# Patient Record
Sex: MALE | Race: WHITE | NOT HISPANIC OR LATINO | Employment: OTHER | URBAN - METROPOLITAN AREA
[De-identification: names, ages, dates, MRNs, and addresses within clinical notes are randomized per-mention and may not be internally consistent; named-entity substitution may affect disease eponyms.]

---

## 2017-12-18 ENCOUNTER — TRANSFERRED RECORDS (OUTPATIENT)
Dept: HEALTH INFORMATION MANAGEMENT | Facility: CLINIC | Age: 67
End: 2017-12-18

## 2017-12-18 LAB
CREAT SERPL-MCNC: 0.97 MG/DL (ref 0.7–1.2)
GFR SERPL CREATININE-BSD FRML MDRD: >60 ML/MIN/1.73M2
GLUCOSE SERPL-MCNC: 158 MG/DL (ref 70–100)
HBA1C MFR BLD: 5.7 % (ref 4–6)
POTASSIUM SERPL-SCNC: 4.5 MEQ/L (ref 3.4–5.1)

## 2017-12-27 LAB
CREAT SERPL-MCNC: 0.93 MG/DL (ref 0.7–1.2)
GFR SERPL CREATININE-BSD FRML MDRD: >60 ML/MIN/1.73M2

## 2018-02-08 ENCOUNTER — TRANSFERRED RECORDS (OUTPATIENT)
Dept: HEALTH INFORMATION MANAGEMENT | Facility: CLINIC | Age: 68
End: 2018-02-08

## 2018-03-14 ENCOUNTER — TRANSFERRED RECORDS (OUTPATIENT)
Dept: HEALTH INFORMATION MANAGEMENT | Facility: CLINIC | Age: 68
End: 2018-03-14

## 2018-05-19 ENCOUNTER — TRANSFERRED RECORDS (OUTPATIENT)
Dept: HEALTH INFORMATION MANAGEMENT | Facility: CLINIC | Age: 68
End: 2018-05-19

## 2018-05-19 LAB
ALT SERPL-CCNC: 28 IU/L (ref 6–40)
AST SERPL-CCNC: 19 IU/L (ref 10–40)
CREAT SERPL-MCNC: 1.17 MG/DL (ref 0.7–1.2)
GFR SERPL CREATININE-BSD FRML MDRD: >60 ML/MIN/1.73M2
GLUCOSE SERPL-MCNC: 117 MG/DL (ref 70–100)
POTASSIUM SERPL-SCNC: 4.1 MEQ/L (ref 3.4–5.1)

## 2018-05-20 ENCOUNTER — TRANSFERRED RECORDS (OUTPATIENT)
Dept: HEALTH INFORMATION MANAGEMENT | Facility: CLINIC | Age: 68
End: 2018-05-20

## 2018-06-23 ENCOUNTER — TELEPHONE (OUTPATIENT)
Dept: UROLOGY | Facility: CLINIC | Age: 68
End: 2018-06-23

## 2018-06-23 NOTE — TELEPHONE ENCOUNTER
Health Call Center    Phone Message    May a detailed message be left on voicemail: yes    Reason for Call: Other: Patient's wife scheduled an appointment for patient in July. Patient has kidney stones and having complication and his wife is requesting a call. Patient's wife wants to discuss a couple things before patient's appointment. Please follow up with Cass     Action Taken: Message routed to:  [facility] ([facility] Urology

## 2018-06-25 ENCOUNTER — PRE VISIT (OUTPATIENT)
Dept: UROLOGY | Facility: CLINIC | Age: 68
End: 2018-06-25

## 2018-06-25 NOTE — TELEPHONE ENCOUNTER
MEDICAL RECORDS REQUEST   [facility]  Urology Clinic  [address] [place] [address]  PHONE: [phone]  Fax: [phone]        FUTURE VISIT INFORMATION                                                   Nash De Leon, : 1950 scheduled for future visit at [facility]    APPOINTMENT INFORMATION:    Date: 2018    Provider:  JI TORRES    Reason for Visit/Diagnosis: KIDNEY STONES    REFERRAL INFORMATION:    Referring provider:  SELF    Specialty: SELF    Referring providers clinic:  SELF    Clinic contact number:  SELF    RECORDS REQUESTED FOR VISIT                                                     NOTES  STATUS/DETAILS   OFFICE NOTE from referring provider  yes   OFFICE NOTE from other specialist  no   DISCHARGE SUMMARY from hospital  no   DISCHARGE REPORT from the ER  no   OPERATIVE REPORT  no   MEDICATION LIST  yes   KIDNEY STONE     CT STONE  yes   IMAGING (IMAGES & REPORT)  yes   KUB  yes       PRE-VISIT CHECKLIST      Record collection complete yes   Appointment appropriately scheduled           (right time/right provider) Yes   Joint diagnostic appointment coordinated correctly          (ensure right order & amount of time) No   MyChart activation If no, please explain IN PROCESS   Questionnaire complete If no, please explain IN PROCESS     Completed by: Soco Abrams

## 2018-06-25 NOTE — TELEPHONE ENCOUNTER
[facility] Call [facility]    Phone Message    May a detailed message be left on voicemail: no    Reason for Call: Other: Patient wife returning missed call from Wendy. Please follow-up.      Action Taken: Message routed to:  [facility] Uro

## 2018-06-26 ENCOUNTER — TELEPHONE (OUTPATIENT)
Dept: UROLOGY | Facility: CLINIC | Age: 68
End: 2018-06-26

## 2018-06-26 ENCOUNTER — PRE VISIT (OUTPATIENT)
Dept: UROLOGY | Facility: CLINIC | Age: 68
End: 2018-06-26

## 2018-06-26 NOTE — TELEPHONE ENCOUNTER
Called patient and left message that we could see him today at 1pm I need confirmation that he is coming and records must come too and imaging. Wendy Ferrera LPN Staff Nurse

## 2018-07-24 ENCOUNTER — TRANSFERRED RECORDS (OUTPATIENT)
Dept: HEALTH INFORMATION MANAGEMENT | Facility: CLINIC | Age: 68
End: 2018-07-24

## 2018-07-24 ENCOUNTER — CARE COORDINATION (OUTPATIENT)
Dept: UROLOGY | Facility: CLINIC | Age: 68
End: 2018-07-24

## 2018-07-24 ENCOUNTER — OFFICE VISIT (OUTPATIENT)
Dept: UROLOGY | Facility: CLINIC | Age: 68
End: 2018-07-24
Payer: COMMERCIAL

## 2018-07-24 ENCOUNTER — APPOINTMENT (OUTPATIENT)
Dept: UROLOGY | Facility: CLINIC | Age: 68
End: 2018-07-24
Payer: COMMERCIAL

## 2018-07-24 VITALS
HEART RATE: 63 BPM | WEIGHT: 159.4 LBS | HEIGHT: 64 IN | DIASTOLIC BLOOD PRESSURE: 76 MMHG | BODY MASS INDEX: 27.21 KG/M2 | SYSTOLIC BLOOD PRESSURE: 129 MMHG

## 2018-07-24 DIAGNOSIS — Z01.89 ENCOUNTER FOR URINE TEST: Primary | ICD-10-CM

## 2018-07-24 DIAGNOSIS — N20.1 CALCULUS OF URETER: Primary | ICD-10-CM

## 2018-07-24 DIAGNOSIS — Z01.89 ENCOUNTER FOR URINE TEST: ICD-10-CM

## 2018-07-24 RX ORDER — NITROGLYCERIN 0.4 MG/1
0.4 TABLET SUBLINGUAL EVERY 5 MIN PRN
COMMUNITY
Start: 2017-12-18

## 2018-07-24 RX ORDER — CLOPIDOGREL BISULFATE 75 MG/1
TABLET ORAL
COMMUNITY
Start: 2018-04-23 | End: 2018-09-11

## 2018-07-24 RX ORDER — ATORVASTATIN CALCIUM 80 MG/1
80 TABLET, FILM COATED ORAL DAILY
COMMUNITY
Start: 2018-01-26 | End: 2023-01-01

## 2018-07-24 RX ORDER — ATORVASTATIN CALCIUM 80 MG/1
TABLET, FILM COATED ORAL
COMMUNITY
Start: 2018-03-31 | End: 2018-09-11

## 2018-07-24 RX ORDER — METOPROLOL SUCCINATE 25 MG/1
TABLET, EXTENDED RELEASE ORAL
COMMUNITY
Start: 2018-06-25 | End: 2023-01-01

## 2018-07-24 RX ORDER — POLYETHYLENE GLYCOL 3350 17 G/17G
POWDER, FOR SOLUTION ORAL
COMMUNITY
Start: 2018-02-08 | End: 2018-09-11

## 2018-07-24 RX ORDER — CLOPIDOGREL BISULFATE 75 MG/1
75 TABLET ORAL DAILY
COMMUNITY
Start: 2018-01-26 | End: 2023-01-01

## 2018-07-24 RX ORDER — HYDROCODONE BITARTRATE AND ACETAMINOPHEN 5; 325 MG/1; MG/1
TABLET ORAL
COMMUNITY
Start: 2018-05-20 | End: 2018-07-24

## 2018-07-24 ASSESSMENT — ENCOUNTER SYMPTOMS
WEAKNESS: 0
DISTURBANCES IN COORDINATION: 0
HEARTBURN: 0
HEMATURIA: 1
ABDOMINAL PAIN: 0
NAIL CHANGES: 0
ALTERED TEMPERATURE REGULATION: 0
FATIGUE: 1
TINGLING: 1
PANIC: 0
LOSS OF CONSCIOUSNESS: 0
SINUS PAIN: 0
FLANK PAIN: 1
SEIZURES: 0
DECREASED APPETITE: 0
DYSPNEA ON EXERTION: 1
BLOOD IN STOOL: 0
MUSCLE CRAMPS: 1
SPEECH CHANGE: 0
DYSURIA: 0
NUMBNESS: 1
HALLUCINATIONS: 0
HEADACHES: 0
COUGH: 1
TREMORS: 0
MYALGIAS: 1
NAUSEA: 1
DIZZINESS: 1
COUGH DISTURBING SLEEP: 0
MEMORY LOSS: 0
TASTE DISTURBANCE: 0
POSTURAL DYSPNEA: 0
BRUISES/BLEEDS EASILY: 1
DEPRESSION: 1
VOMITING: 1
POLYPHAGIA: 0
SORE THROAT: 0
DIARRHEA: 0
INSOMNIA: 0
SKIN CHANGES: 0
WEIGHT LOSS: 0
FEVER: 0
BLOATING: 0
WHEEZING: 1
INCREASED ENERGY: 1
DECREASED CONCENTRATION: 0
TROUBLE SWALLOWING: 0
POLYDIPSIA: 1
STIFFNESS: 1
NECK PAIN: 1
HOARSE VOICE: 0
SWOLLEN GLANDS: 0
BACK PAIN: 1
MUSCLE WEAKNESS: 1
SINUS CONGESTION: 1
DIFFICULTY URINATING: 1
SPUTUM PRODUCTION: 1
WEIGHT GAIN: 0
NERVOUS/ANXIOUS: 0
SNORES LOUDLY: 1
RECTAL PAIN: 0
PARALYSIS: 0
NECK MASS: 0
JOINT SWELLING: 1
CHILLS: 0
POOR WOUND HEALING: 1
JAUNDICE: 0
BOWEL INCONTINENCE: 0
NIGHT SWEATS: 0
CONSTIPATION: 0
SHORTNESS OF BREATH: 0
SMELL DISTURBANCE: 0
HEMOPTYSIS: 0
ARTHRALGIAS: 1

## 2018-07-24 ASSESSMENT — PAIN SCALES - GENERAL: PAINLEVEL: MILD PAIN (2)

## 2018-07-24 NOTE — PATIENT INSTRUCTIONS
Jessa will discuss surgery with you.     It was a pleasure meeting with you today.  Thank you for allowing me and my team the privilege of caring for you today.  YOU are the reason we are here, and I truly hope we provided you with the excellent service you deserve.  Please let us know if there is anything else we can do for you so that we can be sure you are leaving completely satisfied with your care experience.      Vadim Banks LPN

## 2018-07-24 NOTE — MR AVS SNAPSHOT
After Visit Summary   7/24/2018    Nash De Leon    MRN: [id number]           Patient Information     Date Of Birth          1950        Visit Information        Provider Department      7/24/2018 2:20 PM Terrell Forman MD [facility]        Today's Diagnoses     Calculus of ureter    -  1      Care Instructions    Jessa will discuss surgery with you.     It was a pleasure meeting with you today.  Thank you for allowing me and my team the privilege of caring for you today.  YOU are the reason we are here, and I truly hope we provided you with the excellent service you deserve.  Please let us know if there is anything else we can do for you so that we can be sure you are leaving completely satisfied with your care experience.      Vadim Banks LPN          Follow-ups after your visit        Your next 10 appointments already scheduled     Aug 23, 2018   Procedure with Terrell Forman MD   [facility], [place], Same Day Surgery (--)    [address] [address] [phone]              Who to contact     Please call your clinic at [phone] to:    Ask questions about your health    Make or cancel appointments    Discuss your medicines    Learn about your test results    Speak to your doctor            Additional Information About Your Visit        [employer] Information     [employer] gives you secure access to your electronic health record. If you see a primary care provider, you can also send messages to your care team and make appointments. If you have questions, please call your primary care clinic.  If you do not have a primary care provider, please call [phone] and they will assist you.      [employer] is an electronic gateway that provides easy, online access to your medical records. With [employer], you can request a clinic appointment, read your test results, renew a prescription or communicate with your care team.     To  "access your existing account, please contact your [facility] or call [phone] for assistance.        Care EveryWhere ID     This is your Care EveryWhere ID. This could be used by other organizations to access your [facility] medical records  [id number]        Your Vitals Were     Pulse Height BMI (Body Mass Index)             63 1.626 m (5' 4\") 27.36 kg/m2          Blood Pressure from Last 3 Encounters:   07/24/18 129/76    Weight from Last 3 Encounters:   07/24/18 72.3 kg (159 lb 6.4 oz)              We Performed the Following     [facility]le-Operative Worksheet (Urology)        Primary Care Provider    None Specified       No primary provider on file.        Equal Access to Services     RON BROWN : Efrain Alvarez, diandra acevedo, marixa beyer, colleen menard . So [facility] [phone].    ATENCIÓN: Si habla español, tiene a lemus disposición servicios gratuitos de asistencia lingüística. Llame al [phone].    We comply with applicable federal civil rights laws and [place] laws. We do not discriminate on the basis of race, color, national origin, age, disability, sex, sexual orientation, or gender identity.            Thank you!     Thank you for choosing [facility] UROLOGY AND [facility] FOR PR[facility]ATE [facility] UROLOGIC [facility]CERS  for your care. Our goal is always to provide you with excellent care. Hearing back from our patients is one way we can continue to improve our services. Please take a few minutes to complete the written survey that you may receive in the mail after your visit with us. Thank you!             Your Updated Medication List - Protect others around you: Learn how to safely use, store and throw away your medicines at www.disposemymeds.org.          This list is accurate as of 7/24/18 11:59 PM.  Always use your most recent med list.                   Brand Name Dispense Instructions for use Diagnosis    aspirin 81 MG tablet      " Take 81 mg by mouth        * atorvastatin 80 MG tablet    LIPITOR     Take 80 mg by mouth        * atorvastatin 80 MG tablet    LIPITOR          * clopidogrel 75 MG tablet    PLAVIX     Take 75 mg by mouth        * clopidogrel 75 MG tablet    PLAVIX          metoprolol succinate 25 MG 24 hr tablet    TOPROL-XL     TAKE 1/2 TABLET ONCE DAILY - DO NOT CRUSH OR CHEW        nitroGLYcerin 0.4 MG sublingual tablet    NITROSTAT     Place 0.4 mg under the tongue        polyethylene glycol powder    MIRALAX/GLYCOLAX          * Notice:  This list has 4 medication(s) that are the same as other medications prescribed for you. Read the directions carefully, and ask your doctor or other care provider to review them with you.

## 2018-07-24 NOTE — PROGRESS NOTES
Chief Complaint:   Left ureteral stone         History of Present Illness:    Nash De Leon is a very pleasant 67 year old male who presents with a history of CAD, HLD, and Meniere's disease. Patient presented to an outside ER with sudden onset left flank pain associated with hematuria and dysuria. A 2.1 cm stone was seen in the left proximal ureter on CT and a smaller one more distal, causing hydronephrosis. This was stented leading to a cessation in pain. However, the patient continues to have hematuria with excessive activity, but the pain has resolved since stent placement. Patient had two stents placed in his heart in December 2017. He is on Plavix and a baby aspirin currently at the recommendation of his cardiologist.     First stone: 05/10/18  Number of stone surgeries: stent placement done in May, stent still present  Number of stones passed spontaneously: 0  History of UTI: no  Prior Stone Analysis: no  Family History Nephrolithasis: sister has had one stone  Stone Risk Factors: obesity  Prior Metabolic Workup: no         Past Medical History:   CAD, HLD, Meniere's disease         Past Surgical History:   TURP         Medications     Current Outpatient Prescriptions   Medication     aspirin 81 MG tablet     atorvastatin (LIPITOR) 80 MG tablet     atorvastatin (LIPITOR) 80 MG tablet     clopidogrel (PLAVIX) 75 MG tablet     clopidogrel (PLAVIX) 75 MG tablet     metoprolol succinate (TOPROL-XL) 25 MG 24 hr tablet     nitroGLYcerin (NITROSTAT) 0.4 MG sublingual tablet     polyethylene glycol (MIRALAX/GLYCOLAX) powder     No current facility-administered medications for this visit.             Family History:   Sister with stone's, otherwise noncontributory         Social History:     Social History     Social History     Marital status: Single     Spouse name: N/A     Number of children: N/A     Years of education: N/A     Occupational History     Not on file.     Social History Main Topics      "Smoking status: Former Smoker     Quit date: 7/24/1998     Smokeless tobacco: Never Used     Alcohol use Not on file     Drug use: Not on file     Sexual activity: Not on file     Other Topics Concern     Not on file     Social History Narrative     No narrative on file            Allergies:   Review of patient's allergies indicates not on file.         Review of Systems:  From intake questionnaire   Negative 14 system review except as noted on HPI, nurse's note.         Physical Exam:   Patient is a 67 year old  male   Vitals: Blood pressure 129/76, pulse 63, height 1.626 m (5' 4\"), weight 72.3 kg (159 lb 6.4 oz).  General Appearance Adult: Alert, no acute distress, oriented  HENT: throat/mouth:normal, good dentition  Lungs: no respiratory distress, or pursed lip breathing  Heart: No obvious jugular venous distension present  Abdomen: soft, nontender, no organomegaly or masses, Body mass index is 27.36 kg/(m^2).  Musculoskeltal: extremities normal, no peripheral edema  Skin: no suspicious lesions or rashes  Neuro: Alert, oriented, speech and mentation normal  Psych: affect and mood normal  Gait: Normal  : deferred        Labs and Pathology:    I personally reviewed all applicable laboratory data and went over findings with patient  Significant for:    CBC RESULTS:  Getting blood work today     BMP RESULTS:  Recent Labs   Lab Test 05/19/18 12/27/17 12/18/17   POTASSIUM  4.1   --   4.5   GLC  117*   --   158*   CR  1.17  0.93  0.97   GFRESTIMATED  >60  >60  >60         Imaging:    I personally reviewed all applicable imaging and went over the below findings with patient.    CT (OS 5/19.18)    TECHNIQUE: Volumetric multidetector CT images of the abdomen and pelvis from the lung bases to the pubic symphysis were obtained following the administration of 100 mL of Omnipaque 350 IV contrast.  Multiple planes were reconstructed for examination.      FINDINGS:   The cardiac chambers and pericardium are grossly normal. " The visualized lung bases are clear without focal pulmonary infiltrate, pleural effusion, or pneumothorax.     There is a large stone within the proximal left ureter measuring approximately 1.6 x 0.9 x 2.0 cm (AP/Trans/CC). There is moderate left-sided hydronephrosis and hydroureter. There is a smaller stone within the mid aspect of left ureter measuring approximately 4 5-mm best visualized on series 2 image 62/112. The urinary bladder is grossly unremarkable. There is a smaller 2 mm nonobstructing stone within the inferior aspect the left kidney. There is soft tissue stranding adjacent to the left kidney and left ureter.    The liver is normal in size without intrahepatic ductal dilatation or focal lesion. Small stone within the fundal portion of the gallbladder (series 2 image 42/112) without evidence of pericholecystic inflammatory changes. The common bile duct, spleen, pancreas,  and bilateral adrenal glands are unremarkable. The non opacified small bowel is unremarkable. There is scattered colonic diverticulosis without evidence diverticulitis. There is no pathologic mesenteric or abdominal lymphadenopathy. There is no free air or free fluid within the peritoneal cavity. The nonaneurysmal aorta and its branches are unremarkable.     There are no acute osseous abnormalities.     IMPRESSION:   1. There is a large stone within the proximal left ureter measuring approximately 1.6 x 0.9 x 2.0 cm (AP/Trans/CC) with moderate left-sided hydronephrosis and hydroureter. There is a smaller stone within the mid aspect of the left ureter measuring approximately 4 5-mm. Nonobstructing stone within the inferior aspect of the left kidney measuring approximately 2 mm. Would recommend consultation with urology services for further evaluation with possible intervention.             Assessment and Plan:     Assessment:67 year old male with large left ureteral stones, stent in place currently. On both Plavix and a baby aspirin  currently following cardiac stent placement in December 2017    Plan:  We discussed the surgical treatment options for renal stones including shock wave lithotripsy, ureteroscopy, and percutaneous nephrolithotomy.  We discussed the risks and benefits of each approach in the context of the patient's current stone burden.  After consideration of each Mr. De Leon expressed preference for PCNL given the higher likelihood of complete stone clearance.  He is aware of the potential for increased risk of complication, particualrly bleeding if on aspirin.  We alse discussed the risks of infection, incomplete stone removal, damage to adjacent organs, and need for staged procedures.  Discussed that PCNL not feasible if has to stay on plavix but that URS could be attempted though would likely be longer procedure with higher loielihood of residual stones and need for second stage.      We will contact the patient's cardiologist and see whether or not he is able to hold his Plavix for a week prior to a procedure. If the Plavix can be held we will plan on percutaneous intervention and if it can not be held we will plan on a a two stage ureteroscopic procedure. The patient understands this, will tentativley plan for URS possible PCNL pending anticoagulation recommendations from his cardiologist.    Cam Romano, MS4    Scribe Disclosure:   IMars, am serving as a scribe; to document services personally performed by Dr. Forman- -based on data collection and the provider's statements to me.     Terrell BLUE saw and evaluated this patient and agree with the plan as stated above.  I personally performed all listed procedures.

## 2018-07-24 NOTE — NURSING NOTE
Chief Complaint   Patient presents with     Consult     kidney stone management    Vadim Banks LPN

## 2018-07-24 NOTE — LETTER
7/24/2018       RE: Nash De Leon  [address]  [facility] [address]     Dear Colleague,    Thank you for referring your patient, Nash De Leon, to the [facility] UROLOGY AND INST FOR PROSTATE AND UROLOGIC CANCERS at [facility]. Please see a copy of my visit note below.          Chief Complaint:   Left ureteral stone         History of Present Illness:    Nash De Leon is a very pleasant 67 year old male who presents with a history of CAD, HLD, and Meniere's disease. Patient presented to an outside ER with sudden onset left flank pain associated with hematuria and dysuria. A 2.1 cm stone was seen in the left proximal ureter on CT and a smaller one more distal, causing hydronephrosis. This was stented leading to a cessation in pain. However, the patient continues to have hematuria with excessive activity, but the pain has resolved since stent placement. Patient had two stents placed in his heart in December 2017. He is on Plavix and a baby aspirin currently at the recommendation of his cardiologist.     First stone: 05/10/18  Number of stone surgeries: stent placement done in May, stent still present  Number of stones passed spontaneously: 0  History of UTI: no  Prior Stone Analysis: no  Family History Nephrolithasis: sister has had one stone  Stone Risk Factors: obesity  Prior Metabolic Workup: no         Past Medical History:   CAD, HLD, Meniere's disease         Past Surgical History:   TURP         Medications     Current Outpatient Prescriptions   Medication     aspirin 81 MG tablet     atorvastatin (LIPITOR) 80 MG tablet     atorvastatin (LIPITOR) 80 MG tablet     clopidogrel (PLAVIX) 75 MG tablet     clopidogrel (PLAVIX) 75 MG tablet     metoprolol succinate (TOPROL-XL) 25 MG 24 hr tablet     nitroGLYcerin (NITROSTAT) 0.4 MG sublingual tablet     polyethylene glycol (MIRALAX/GLYCOLAX) powder     No current facility-administered medications for this visit.  "            Family History:   Sister with stone's, otherwise noncontributory         Social History:     Social History     Social History     Marital status: Single     Spouse name: N/A     Number of children: N/A     Years of education: N/A     Occupational History     Not on file.     Social History Main Topics     Smoking status: Former Smoker     Quit date: 7/24/1998     Smokeless tobacco: Never Used     Alcohol use Not on file     Drug use: Not on file     Sexual activity: Not on file     Other Topics Concern     Not on file     Social History Narrative     No narrative on file            Allergies:   Review of patient's allergies indicates not on file.         Review of Systems:  From intake questionnaire   Negative 14 system review except as noted on HPI, nurse's note.         Physical Exam:   Patient is a 67 year old  male   Vitals: Blood pressure 129/76, pulse 63, height 1.626 m (5' 4\"), weight 72.3 kg (159 lb 6.4 oz).  General Appearance Adult: Alert, no acute distress, oriented  HENT: throat/mouth:normal, good dentition  Lungs: no respiratory distress, or pursed lip breathing  Heart: No obvious jugular venous distension present  Abdomen: soft, nontender, no organomegaly or masses, Body mass index is 27.36 kg/(m^2).  Musculoskeltal: extremities normal, no peripheral edema  Skin: no suspicious lesions or rashes  Neuro: Alert, oriented, speech and mentation normal  Psych: affect and mood normal  Gait: Normal  : deferred        Labs and Pathology:    I personally reviewed all applicable laboratory data and went over findings with patient  Significant for:    CBC RESULTS:  Getting blood work today     BMP RESULTS:  Recent Labs   Lab Test 05/19/18 12/27/17 12/18/17   POTASSIUM  4.1   --   4.5   GLC  117*   --   158*   CR  1.17  0.93  0.97   GFRESTIMATED  >60  >60  >60         Imaging:    I personally reviewed all applicable imaging and went over the below findings with patient.    CT ([facility] " 5/19.18)    TECHNIQUE: Volumetric multidetector CT images of the abdomen and pelvis from the lung bases to the pubic symphysis were obtained following the administration of 100 mL of Omnipaque 350 IV contrast.  Multiple planes were reconstructed for examination.      FINDINGS:   The cardiac chambers and pericardium are grossly normal. The visualized lung bases are clear without focal pulmonary infiltrate, pleural effusion, or pneumothorax.     There is a large stone within the proximal left ureter measuring approximately 1.6 x 0.9 x 2.0 cm (AP/Trans/CC). There is moderate left-sided hydronephrosis and hydroureter. There is a smaller stone within the mid aspect of left ureter measuring approximately 4 5-mm best visualized on series 2 image 62/112. The urinary bladder is grossly unremarkable. There is a smaller 2 mm nonobstructing stone within the inferior aspect the left kidney. There is soft tissue stranding adjacent to the left kidney and left ureter.    The liver is normal in size without intrahepatic ductal dilatation or focal lesion. Small stone within the fundal portion of the gallbladder (series 2 image 42/112) without evidence of pericholecystic inflammatory changes. The common bile duct, spleen, pancreas,  and bilateral adrenal glands are unremarkable. The non opacified small bowel is unremarkable. There is scattered colonic diverticulosis without evidence diverticulitis. There is no pathologic mesenteric or abdominal lymphadenopathy. There is no free air or free fluid within the peritoneal cavity. The nonaneurysmal aorta and its branches are unremarkable.     There are no acute osseous abnormalities.     IMPRESSION:   1. There is a large stone within the proximal left ureter measuring approximately 1.6 x 0.9 x 2.0 cm (AP/Trans/CC) with moderate left-sided hydronephrosis and hydroureter. There is a smaller stone within the mid aspect of the left ureter measuring approximately 4 5-mm. Nonobstructing stone  within the inferior aspect of the left kidney measuring approximately 2 mm. Would recommend consultation with urology services for further evaluation with possible intervention.             Assessment and Plan:     Assessment:67 year old male with large left ureteral stones, stent in place currently. On both Plavix and a baby aspirin currently following cardiac stent placement in December 2017    Plan:  We discussed the surgical treatment options for renal stones including shock wave lithotripsy, ureteroscopy, and percutaneous nephrolithotomy.  We discussed the risks and benefits of each approach in the context of the patient's current stone burden.  After consideration of each Mr. De Leon expressed preference for PCNL given the higher likelihood of complete stone clearance.  He is aware of the potential for increased risk of complication, particualrly bleeding if on aspirin.  We alse discussed the risks of infection, incomplete stone removal, damage to adjacent organs, and need for staged procedures.  Discussed that PCNL not feasible if has to stay on plavix but that URS could be attempted though would likely be longer procedure with higher loielihood of residual stones and need for second stage.      We will contact the patient's cardiologist and see whether or not he is able to hold his Plavix for a week prior to a procedure. If the Plavix can be held we will plan on percutaneous intervention and if it can not be held we will plan on a a two stage ureteroscopic procedure. The patient understands this, will tentativley plan for URS possible PCNL pending anticoagulation recommendations from his cardiologist.    Cam Romano, MS4    Scribe Disclosure:   Mars BLUE, am serving as a scribe; to document services personally performed by Dr. Forman- -based on data collection and the provider's statements to me.     ITerrell saw and evaluated this patient and agree with the plan as stated above.  I  personally performed all listed procedures.

## 2018-07-25 LAB
BACTERIA SPEC CULT: NO GROWTH
Lab: NORMAL
SPECIMEN SOURCE: NORMAL

## 2018-08-09 NOTE — PROGRESS NOTES
Spoke with patient and wife. Cardiology gave the ok to hold plavix for 7 days prior to surgery. The are having pre-op done in [place], [place].

## 2018-08-22 ENCOUNTER — ANESTHESIA EVENT (OUTPATIENT)
Dept: SURGERY | Facility: CLINIC | Age: 68
End: 2018-08-22
Payer: COMMERCIAL

## 2018-08-23 ENCOUNTER — APPOINTMENT (OUTPATIENT)
Dept: GENERAL RADIOLOGY | Facility: CLINIC | Age: 68
End: 2018-08-23
Attending: UROLOGY
Payer: COMMERCIAL

## 2018-08-23 ENCOUNTER — ANESTHESIA (OUTPATIENT)
Dept: SURGERY | Facility: CLINIC | Age: 68
End: 2018-08-23
Payer: COMMERCIAL

## 2018-08-23 ENCOUNTER — HOSPITAL ENCOUNTER (OUTPATIENT)
Facility: CLINIC | Age: 68
Discharge: HOME | End: 2018-08-23
Attending: UROLOGY | Admitting: UROLOGY
Payer: COMMERCIAL

## 2018-08-23 ENCOUNTER — SURGERY (OUTPATIENT)
Age: 68
End: 2018-08-23

## 2018-08-23 VITALS
OXYGEN SATURATION: 98 % | BODY MASS INDEX: 26.72 KG/M2 | WEIGHT: 156.53 LBS | RESPIRATION RATE: 18 BRPM | SYSTOLIC BLOOD PRESSURE: 125 MMHG | TEMPERATURE: 97.5 F | HEIGHT: 64 IN | DIASTOLIC BLOOD PRESSURE: 68 MMHG | HEART RATE: 64 BPM

## 2018-08-23 DIAGNOSIS — R33.8 BENIGN PROSTATIC HYPERPLASIA WITH URINARY RETENTION: Primary | ICD-10-CM

## 2018-08-23 DIAGNOSIS — N40.1 BENIGN PROSTATIC HYPERPLASIA WITH URINARY RETENTION: Primary | ICD-10-CM

## 2018-08-23 DIAGNOSIS — N20.1 URETERAL STONE: ICD-10-CM

## 2018-08-23 LAB — GLUCOSE BLDC GLUCOMTR-MCNC: 92 MG/DL (ref 70–99)

## 2018-08-23 PROCEDURE — 36000061 ZZH SURGERY LEVEL 3 W FLUORO 1ST 30 MIN - UMMC: Performed by: UROLOGY

## 2018-08-23 PROCEDURE — 40000170 ZZH STATISTIC PRE-PROCEDURE ASSESSMENT II: Performed by: UROLOGY

## 2018-08-23 PROCEDURE — C1769 GUIDE WIRE: HCPCS | Performed by: UROLOGY

## 2018-08-23 PROCEDURE — 25000125 ZZHC RX 250: Performed by: NURSE ANESTHETIST, CERTIFIED REGISTERED

## 2018-08-23 PROCEDURE — 36000059 ZZH SURGERY LEVEL 3 EA 15 ADDTL MIN UMMC: Performed by: UROLOGY

## 2018-08-23 PROCEDURE — 25000128 H RX IP 250 OP 636: Performed by: UROLOGY

## 2018-08-23 PROCEDURE — 27210794 ZZH OR GENERAL SUPPLY STERILE: Performed by: UROLOGY

## 2018-08-23 PROCEDURE — C1758 CATHETER, URETERAL: HCPCS | Performed by: UROLOGY

## 2018-08-23 PROCEDURE — 37000009 ZZH ANESTHESIA TECHNICAL FEE, EACH ADDTL 15 MIN: Performed by: UROLOGY

## 2018-08-23 PROCEDURE — 37000008 ZZH ANESTHESIA TECHNICAL FEE, 1ST 30 MIN: Performed by: UROLOGY

## 2018-08-23 PROCEDURE — C2617 STENT, NON-COR, TEM W/O DEL: HCPCS | Performed by: UROLOGY

## 2018-08-23 PROCEDURE — 25000132 ZZH RX MED GY IP 250 OP 250 PS 637: Performed by: ANESTHESIOLOGY

## 2018-08-23 PROCEDURE — 71000027 ZZH RECOVERY PHASE 2 EACH 15 MINS: Performed by: UROLOGY

## 2018-08-23 PROCEDURE — 25000566 ZZH SEVOFLURANE, EA 15 MIN: Performed by: UROLOGY

## 2018-08-23 PROCEDURE — C9399 UNCLASSIFIED DRUGS OR BIOLOG: HCPCS | Performed by: NURSE ANESTHETIST, CERTIFIED REGISTERED

## 2018-08-23 PROCEDURE — 82962 GLUCOSE BLOOD TEST: CPT

## 2018-08-23 PROCEDURE — 27211024 ZZHC OR SUPPLY OTHER OPNP: Performed by: UROLOGY

## 2018-08-23 PROCEDURE — 25000128 H RX IP 250 OP 636: Performed by: NURSE ANESTHETIST, CERTIFIED REGISTERED

## 2018-08-23 PROCEDURE — 25500064 ZZH RX 255 OP 636: Performed by: UROLOGY

## 2018-08-23 PROCEDURE — 40000278 XR SURGERY CARM FLUORO LESS THAN 5 MIN: Mod: TC

## 2018-08-23 PROCEDURE — 82365 CALCULUS SPECTROSCOPY: CPT | Performed by: UROLOGY

## 2018-08-23 PROCEDURE — 71000014 ZZH RECOVERY PHASE 1 LEVEL 2 FIRST HR: Performed by: UROLOGY

## 2018-08-23 DEVICE — STENT URETERAL PERCUFLEX PLUS 7FRX24CM M0061752720
Type: IMPLANTABLE DEVICE | Site: KIDNEY | Status: NON-FUNCTIONAL
Removed: 2018-09-13

## 2018-08-23 RX ORDER — SODIUM CHLORIDE, SODIUM LACTATE, POTASSIUM CHLORIDE, CALCIUM CHLORIDE 600; 310; 30; 20 MG/100ML; MG/100ML; MG/100ML; MG/100ML
INJECTION, SOLUTION INTRAVENOUS CONTINUOUS
Status: DISCONTINUED | OUTPATIENT
Start: 2018-08-23 | End: 2018-08-23 | Stop reason: HOSPADM

## 2018-08-23 RX ORDER — FENTANYL CITRATE 50 UG/ML
25-50 INJECTION, SOLUTION INTRAMUSCULAR; INTRAVENOUS
Status: DISCONTINUED | OUTPATIENT
Start: 2018-08-23 | End: 2018-08-23 | Stop reason: HOSPADM

## 2018-08-23 RX ORDER — SULFAMETHOXAZOLE AND TRIMETHOPRIM 400; 80 MG/1; MG/1
1 TABLET ORAL 2 TIMES DAILY
Qty: 14 TABLET | Refills: 0 | Status: SHIPPED | OUTPATIENT
Start: 2018-08-23 | End: 2018-09-11

## 2018-08-23 RX ORDER — OXYCODONE HYDROCHLORIDE 5 MG/1
5 TABLET ORAL EVERY 6 HOURS PRN
Qty: 10 TABLET | Refills: 0 | Status: SHIPPED | OUTPATIENT
Start: 2018-08-23 | End: 2023-01-01

## 2018-08-23 RX ORDER — EPHEDRINE SULFATE 50 MG/ML
INJECTION, SOLUTION INTRAMUSCULAR; INTRAVENOUS; SUBCUTANEOUS PRN
Status: DISCONTINUED | OUTPATIENT
Start: 2018-08-23 | End: 2018-08-23

## 2018-08-23 RX ORDER — OXYCODONE HYDROCHLORIDE 5 MG/1
5 TABLET ORAL EVERY 6 HOURS PRN
Qty: 10 TABLET | Refills: 0 | Status: SHIPPED | OUTPATIENT
Start: 2018-08-23 | End: 2018-08-23

## 2018-08-23 RX ORDER — OXYBUTYNIN CHLORIDE 5 MG/1
5 TABLET, EXTENDED RELEASE ORAL DAILY
Qty: 30 TABLET | Refills: 1 | Status: SHIPPED | OUTPATIENT
Start: 2018-08-23 | End: 2023-01-01

## 2018-08-23 RX ORDER — DEXAMETHASONE SODIUM PHOSPHATE 4 MG/ML
INJECTION, SOLUTION INTRA-ARTICULAR; INTRALESIONAL; INTRAMUSCULAR; INTRAVENOUS; SOFT TISSUE PRN
Status: DISCONTINUED | OUTPATIENT
Start: 2018-08-23 | End: 2018-08-23

## 2018-08-23 RX ORDER — NALOXONE HYDROCHLORIDE 0.4 MG/ML
.1-.4 INJECTION, SOLUTION INTRAMUSCULAR; INTRAVENOUS; SUBCUTANEOUS
Status: DISCONTINUED | OUTPATIENT
Start: 2018-08-23 | End: 2018-08-23 | Stop reason: HOSPADM

## 2018-08-23 RX ORDER — ACETAMINOPHEN 325 MG/1
650 TABLET ORAL ONCE
Status: COMPLETED | OUTPATIENT
Start: 2018-08-23 | End: 2018-08-23

## 2018-08-23 RX ORDER — ONDANSETRON 4 MG/1
4 TABLET, ORALLY DISINTEGRATING ORAL EVERY 30 MIN PRN
Status: DISCONTINUED | OUTPATIENT
Start: 2018-08-23 | End: 2018-08-23 | Stop reason: HOSPADM

## 2018-08-23 RX ORDER — PROPOFOL 10 MG/ML
INJECTION, EMULSION INTRAVENOUS PRN
Status: DISCONTINUED | OUTPATIENT
Start: 2018-08-23 | End: 2018-08-23

## 2018-08-23 RX ORDER — CEFAZOLIN SODIUM 1 G/3ML
1 INJECTION, POWDER, FOR SOLUTION INTRAMUSCULAR; INTRAVENOUS SEE ADMIN INSTRUCTIONS
Status: DISCONTINUED | OUTPATIENT
Start: 2018-08-23 | End: 2018-08-23 | Stop reason: HOSPADM

## 2018-08-23 RX ORDER — LIDOCAINE HYDROCHLORIDE 20 MG/ML
INJECTION, SOLUTION INFILTRATION; PERINEURAL PRN
Status: DISCONTINUED | OUTPATIENT
Start: 2018-08-23 | End: 2018-08-23

## 2018-08-23 RX ORDER — ONDANSETRON 2 MG/ML
4 INJECTION INTRAMUSCULAR; INTRAVENOUS EVERY 30 MIN PRN
Status: DISCONTINUED | OUTPATIENT
Start: 2018-08-23 | End: 2018-08-23 | Stop reason: HOSPADM

## 2018-08-23 RX ORDER — TAMSULOSIN HYDROCHLORIDE 0.4 MG/1
0.4 CAPSULE ORAL DAILY
Qty: 30 CAPSULE | Refills: 1 | Status: SHIPPED | OUTPATIENT
Start: 2018-08-23 | End: 2018-11-20

## 2018-08-23 RX ORDER — FENTANYL CITRATE 50 UG/ML
INJECTION, SOLUTION INTRAMUSCULAR; INTRAVENOUS PRN
Status: DISCONTINUED | OUTPATIENT
Start: 2018-08-23 | End: 2018-08-23

## 2018-08-23 RX ORDER — MEPERIDINE HYDROCHLORIDE 25 MG/ML
12.5 INJECTION INTRAMUSCULAR; INTRAVENOUS; SUBCUTANEOUS
Status: DISCONTINUED | OUTPATIENT
Start: 2018-08-23 | End: 2018-08-23 | Stop reason: HOSPADM

## 2018-08-23 RX ORDER — SODIUM CHLORIDE, SODIUM LACTATE, POTASSIUM CHLORIDE, CALCIUM CHLORIDE 600; 310; 30; 20 MG/100ML; MG/100ML; MG/100ML; MG/100ML
INJECTION, SOLUTION INTRAVENOUS CONTINUOUS PRN
Status: DISCONTINUED | OUTPATIENT
Start: 2018-08-23 | End: 2018-08-23

## 2018-08-23 RX ORDER — HYDROMORPHONE HYDROCHLORIDE 1 MG/ML
.3-.5 INJECTION, SOLUTION INTRAMUSCULAR; INTRAVENOUS; SUBCUTANEOUS EVERY 10 MIN PRN
Status: DISCONTINUED | OUTPATIENT
Start: 2018-08-23 | End: 2018-08-23 | Stop reason: HOSPADM

## 2018-08-23 RX ORDER — LIDOCAINE 40 MG/G
CREAM TOPICAL
Status: DISCONTINUED | OUTPATIENT
Start: 2018-08-23 | End: 2018-08-23 | Stop reason: HOSPADM

## 2018-08-23 RX ORDER — LABETALOL HYDROCHLORIDE 5 MG/ML
10 INJECTION, SOLUTION INTRAVENOUS
Status: DISCONTINUED | OUTPATIENT
Start: 2018-08-23 | End: 2018-08-23 | Stop reason: HOSPADM

## 2018-08-23 RX ORDER — ONDANSETRON 2 MG/ML
INJECTION INTRAMUSCULAR; INTRAVENOUS PRN
Status: DISCONTINUED | OUTPATIENT
Start: 2018-08-23 | End: 2018-08-23

## 2018-08-23 RX ORDER — OXYCODONE HYDROCHLORIDE 5 MG/1
5 TABLET ORAL EVERY 4 HOURS PRN
Status: DISCONTINUED | OUTPATIENT
Start: 2018-08-23 | End: 2018-08-23 | Stop reason: HOSPADM

## 2018-08-23 RX ORDER — CEFAZOLIN SODIUM 2 G/100ML
2 INJECTION, SOLUTION INTRAVENOUS
Status: COMPLETED | OUTPATIENT
Start: 2018-08-23 | End: 2018-08-23

## 2018-08-23 RX ADMIN — CEFAZOLIN SODIUM 2 G: 2 INJECTION, SOLUTION INTRAVENOUS at 12:25

## 2018-08-23 RX ADMIN — ROCURONIUM BROMIDE 10 MG: 10 INJECTION INTRAVENOUS at 13:15

## 2018-08-23 RX ADMIN — IOTHALAMATE MEGLUMINE 35 ML: 600 INJECTION INTRAVASCULAR at 13:54

## 2018-08-23 RX ADMIN — FENTANYL CITRATE 50 MCG: 50 INJECTION, SOLUTION INTRAMUSCULAR; INTRAVENOUS at 12:24

## 2018-08-23 RX ADMIN — FENTANYL CITRATE 25 MCG: 50 INJECTION, SOLUTION INTRAMUSCULAR; INTRAVENOUS at 13:00

## 2018-08-23 RX ADMIN — Medication 5 MG: at 12:35

## 2018-08-23 RX ADMIN — ACETAMINOPHEN 650 MG: 325 TABLET ORAL at 09:32

## 2018-08-23 RX ADMIN — SUGAMMADEX 150 MG: 100 INJECTION, SOLUTION INTRAVENOUS at 13:55

## 2018-08-23 RX ADMIN — ONDANSETRON 4 MG: 2 INJECTION INTRAMUSCULAR; INTRAVENOUS at 13:55

## 2018-08-23 RX ADMIN — Medication 5 MG: at 12:40

## 2018-08-23 RX ADMIN — SODIUM CHLORIDE, POTASSIUM CHLORIDE, SODIUM LACTATE AND CALCIUM CHLORIDE: 600; 310; 30; 20 INJECTION, SOLUTION INTRAVENOUS at 12:15

## 2018-08-23 RX ADMIN — Medication 5 MG: at 13:28

## 2018-08-23 RX ADMIN — DEXAMETHASONE SODIUM PHOSPHATE 4 MG: 4 INJECTION, SOLUTION INTRAMUSCULAR; INTRAVENOUS at 12:40

## 2018-08-23 RX ADMIN — FENTANYL CITRATE 25 MCG: 50 INJECTION, SOLUTION INTRAMUSCULAR; INTRAVENOUS at 13:19

## 2018-08-23 RX ADMIN — OXYCODONE HYDROCHLORIDE 5 MG: 5 TABLET ORAL at 16:32

## 2018-08-23 RX ADMIN — ROCURONIUM BROMIDE 30 MG: 10 INJECTION INTRAVENOUS at 12:24

## 2018-08-23 RX ADMIN — PROPOFOL 150 MG: 10 INJECTION, EMULSION INTRAVENOUS at 12:24

## 2018-08-23 RX ADMIN — LIDOCAINE HYDROCHLORIDE 40 MG: 20 INJECTION, SOLUTION INFILTRATION; PERINEURAL at 12:24

## 2018-08-23 NOTE — ANESTHESIA POSTPROCEDURE EVALUATION
Patient: Nash De Leon    Procedure(s):  Cystoscopy, Left Ueteroscopy with Holmium Laser Lithotripsy, Left Stent Placement - Wound Class: II-Clean Contaminated    Diagnosis:Left Ureteral Stone, Calculus Of Kidney   Diagnosis Additional Information: No value filed.    Anesthesia Type:  General, ETT    Note:  Anesthesia Post Evaluation    Patient location during evaluation: PACU and Bedside  Patient participation: Able to fully participate in evaluation  Level of consciousness: awake and alert  Pain management: adequate  Airway patency: patent  Cardiovascular status: acceptable  Respiratory status: acceptable  Hydration status: balanced  PONV: none     Anesthetic complications: None          Last vitals:  Vitals:    08/23/18 1426 08/23/18 1430 08/23/18 1445   BP: 119/71 123/68 125/70   Pulse:      Resp: 16 14 12   Temp:   36.5  C (97.7  F)   SpO2:  100% 95%         Electronically Signed By: Shanice Ortega MD  August 23, 2018  2:57 PM

## 2018-08-23 NOTE — BRIEF OP NOTE
[facility], [place]    Brief Operative Note    Pre-operative diagnosis: Left Ureteral Stone, Calculus Of Kidney   Post-operative diagnosis *Same  Procedure: Procedure(s):  Cystoscopy, Left Ueteroscopy with Holmium Laser Lithotripsy, Left Stent Placement - Wound Class: II-Clean Contaminated  Surgeon: Surgeon(s) and Role:     * Terrell Forman MD - Primary     * Linda Sterling MD - Resident - Assisting  Anesthesia: General   Estimated blood loss: Less than 50 ml  Drains: Chavez catheter, 7x24Fr ureteral stent  Specimens:   ID Type Source Tests Collected by Time Destination   1 :  Calculus/Stone Kidney, Left STONE ANALYSIS Terrell Forman MD 8/23/2018  1:52 PM      Findings:   Large stone in proximal ureter, easily pushed into lower pole of kidney. Unable to completely remove stone. Stent left.  Complications: None.  Implants: None    - Chavez in place for now, will reassess pt in PACU re: discharge planning.

## 2018-08-23 NOTE — DISCHARGE INSTRUCTIONS
Same-Day Surgery   Adult Discharge Orders & Instructions     For 24 hours after surgery:  1. Get plenty of rest.  A responsible adult must stay with you for at least 24 hours after you leave the hospital.   2. Pain medication can slow your reflexes. Do not drive or use heavy equipment.  If you have weakness or tingling, don't drive or use heavy equipment until this feeling goes away.  3. Mixing alcohol and pain medication can cause dizziness and slow your breathing. It can even be fatal. Do not drink alcohol while taking pain medication.  4. Avoid strenuous or risky activities.  Ask for help when climbing stairs.   5. You may feel lightheaded.  If so, sit for a few minutes before standing.  Have someone help you get up.   6. If you have nausea (feel sick to your stomach), drink only clear liquids such as apple juice, ginger ale, broth or 7-Up.  Rest may also help.  Be sure to drink enough fluids.  Move to a regular diet as you feel able. Take pain medications with a small amount of solid food, such as toast or crackers, to avoid nausea.   7. A slight fever is normal. Call the doctor if your fever is over 100 F (37.7 C) (taken under the tongue) or lasts longer than 24 hours.  8. You may have a dry mouth, muscle aches, trouble sleeping or a sore throat.  These symptoms should go away after 24 hours.  9. Do not make important or legal decisions.   Pain Management:      1. Take pain medication (if prescribed) for pain as directed by your physician.        2. WARNING: If the pain medication you have been prescribed contains Tylenol  (acetaminophen), DO NOT take additional doses of Tylenol (acetaminophen).     Call your doctor for any of the followin.  Signs of infection (fever, growing tenderness at the surgery site, severe pain, a large amount of drainage or bleeding, foul-smelling drainage, redness, swelling).    2.  It has been over 8 to 10 hours since surgery and you are still not able to urinate (pee).    3.   Headache for over 24 hours.    4.  Numbness, tingling or weakness the day after surgery (if you had spinal anesthesia).  To contact a doctor, call Dr Forman or:      [phone] and ask for the Resident On Call for:          urology (answered 24 hours a day)      Emergency Department:  [place] [facility]: [phone]  [place] [facility]: [phone]

## 2018-08-23 NOTE — IP AVS SNAPSHOT
[place] [address] [facility] [address]    Phone:  [phone]                                       After Visit Summary   8/23/2018    Nash De Leon    MRN: [id number]           After Visit Summary Signature Page     I have received my discharge instructions, and my questions have been answered. I have discussed any challenges I see with this plan with the nurse or doctor.    ..........................................................................................................................................  Patient/Patient Representative Signature      ..........................................................................................................................................  Patient Representative Print Name and Relationship to Patient    ..................................................               ................................................  Date                                            Time    ..........................................................................................................................................  Reviewed by Signature/Title    ...................................................              ..............................................  Date                                                            Time          22EPIC Rev 08/18

## 2018-08-23 NOTE — OR NURSING
Dr Forman at bedside, irrigated huynh, instilled 200cc sterile water, and removed huynh.     Patient voided 150cc within 15 minutes without issue

## 2018-08-23 NOTE — ANESTHESIA PREPROCEDURE EVALUATION
Anesthesia Evaluation     . Pt has had prior anesthetic. Type: General    No history of anesthetic complications          ROS/MED HX    ENT/Pulmonary:       Neurologic: Comment: Meniere's Dz.      Cardiovascular: Comment: Pt experienced SOB prior to cardiac W/U 12/2017.  Since two stents placed no SOB, no chest pain.    (+) --CAD, --stent,12/2017  2 . Taking blood thinners Pt has received instructions: Instructions Given to patient: Continue Plavix, do not stop for surgery. . . :. . Previous cardiac testing date:results:date: results:ECG reviewed date:8/21/2018 results:NSR date: results:         (-) angina, past MI, angina and past MI   METS/Exercise Tolerance:     Hematologic:  - neg hematologic  ROS       Musculoskeletal:         GI/Hepatic:  - neg GI/hepatic ROS       Renal/Genitourinary:     (+) chronic renal disease, Nephrolithiasis ,       Endo:  - neg endo ROS       Psychiatric:     (+) psychiatric history anxiety and depression      Infectious Disease:  - neg infectious disease ROS       Malignancy:      - no malignancy   Other:    - neg other ROS                 Physical Exam  Normal systems: cardiovascular and pulmonary    Airway   Mallampati: II  TM distance: >3 FB  Neck ROM: full    Dental   (+) missing    Cardiovascular   Rhythm and rate: regular and normal      Pulmonary    breath sounds clear to auscultation    Other findings: No loose teeth.                Anesthesia Plan      History & Physical Review  History and physical reviewed and following examination; no interval change.    ASA Status:  3 .    NPO Status:  > 8 hours and > 2 hours    Plan for General and ETT (Please do a light lubing on the outside of the ETT due to pt using Plavix, prevent tears.) with Intravenous and Propofol induction.   PONV prophylaxis:  Ondansetron (or other 5HT-3)  Reviewed GA risks including sore throat, N/V, tooth damage, heart and lung issues.  All questions answered.  Pt agrees with plan and wishes to proceed.       Postoperative Care  Postoperative pain management:  IV analgesics and Oral pain medications.      Consents  Anesthetic plan, risks, benefits and alternatives discussed with:  Patient.  Use of blood products discussed: Yes.   Use of blood products discussed with Patient.  Consented to blood products.  .                          .

## 2018-08-23 NOTE — IP AVS SNAPSHOT
MRN:[id number]                      After Visit Summary   8/23/2018    Nash De Leon    MRN: [id number]           Thank you!     Thank you for choosing [facility] for your care. Our goal is always to provide you with excellent care. Hearing back from our patients is one way we can continue to improve our services. Please take a few minutes to complete the written survey that you may receive in the mail after you visit with us. Thank you!        Patient Information     Date Of Birth          1950        About your hospital stay     You were admitted on:  August 23, 2018 You last received care in the:  [facility] PACU    You were discharged on:  August 23, 2018       Who to Call     For medical emergencies, please call 911.  For non-urgent questions about your medical care, please call your primary care provider or clinic, [phone]  For questions related to your surgery, please call your surgery clinic        Attending Provider     Provider Specialty    Terrell Forman MD Urology       Primary Care Provider Office Phone # Fax #    Ai FIORDALIZA Simon [phone]      After Care Instructions     Discharge Instructions       Activity  - Do not strain with bowel movements.  - Do not drive until you can press the brake pedal quickly and fully without pain.   - Do not operate a motor vehicle while taking narcotic pain medications.     Stents  - You have a left ureteral stent in place. Stents can cause some discomfort, including some blood in your urine (which is normal), the feeling or urge to urinate frequently, burning with urination and pressure/discomfort in your back while voiding. Stay well hydrated to keep your urine as clear as possible.    Medications  - Flomax and pyridium and oxybutynin - to decrease stent discomfort   - antibiotics - 1 week of Bactrim  - Transition from narcotic pain medications to tylenol (acetaminophen) as you are able.  Wean yourself off all  "pain medications as you are able.  - Some pain medications contain both tylenol (acetaminophen) and a narcotic (Norco, vicodin, percocet), do not take more than 4,000mg of Tylenol (acetaminophen) from all sources in any 24 hour period.  - Narcotics can make you constipated.  Take over the counter fiber (metamucil or benefiber) and stool softeners (miralax, docusate or senna) while taking narcotic pain medications, but stop if you develop diarrhea.  - No driving or operating machinery while taking narcotic pain medications     Follow-Up:  - Follow up with Dr. Forman.  - Call your primary care provider to touch base regarding your recent procedure.   - Call or return sooner than your regularly scheduled visit if you develop any of the following: fever (greater than 101.5), uncontrolled pain, uncontrolled nausea or vomiting, as well as increased redness, swelling, or drainage from your wound.     Phone numbers:   - Monday through Friday 8am to 4:30pm: Call [phone] with questions or to schedule or confirm appointment.    - Nights or weekends: call the after hours emergency pager - [phone] and tell the  \"I would like to page the Urology Resident on call.\"  - For emergencies, call [phone]                  Further instructions from your care team       Same-Day Surgery   Adult Discharge Orders & Instructions     For 24 hours after surgery:  1. Get plenty of rest.  A responsible adult must stay with you for at least 24 hours after you leave the hospital.   2. Pain medication can slow your reflexes. Do not drive or use heavy equipment.  If you have weakness or tingling, don't drive or use heavy equipment until this feeling goes away.  3. Mixing alcohol and pain medication can cause dizziness and slow your breathing. It can even be fatal. Do not drink alcohol while taking pain medication.  4. Avoid strenuous or risky activities.  Ask for help when climbing stairs.   5. You may feel lightheaded.  If so, sit for " a few minutes before standing.  Have someone help you get up.   6. If you have nausea (feel sick to your stomach), drink only clear liquids such as apple juice, ginger ale, broth or 7-Up.  Rest may also help.  Be sure to drink enough fluids.  Move to a regular diet as you feel able. Take pain medications with a small amount of solid food, such as toast or crackers, to avoid nausea.   7. A slight fever is normal. Call the doctor if your fever is over 100 F (37.7 C) (taken under the tongue) or lasts longer than 24 hours.  8. You may have a dry mouth, muscle aches, trouble sleeping or a sore throat.  These symptoms should go away after 24 hours.  9. Do not make important or legal decisions.   Pain Management:      1. Take pain medication (if prescribed) for pain as directed by your physician.        2. WARNING: If the pain medication you have been prescribed contains Tylenol  (acetaminophen), DO NOT take additional doses of Tylenol (acetaminophen).     Call your doctor for any of the followin.  Signs of infection (fever, growing tenderness at the surgery site, severe pain, a large amount of drainage or bleeding, foul-smelling drainage, redness, swelling).    2.  It has been over 8 to 10 hours since surgery and you are still not able to urinate (pee).    3.  Headache for over 24 hours.    4.  Numbness, tingling or weakness the day after surgery (if you had spinal anesthesia).  To contact a doctor, call Dr Forman or:      [phone] and ask for the Resident On Call for:          urology (answered 24 hours a day)      Emergency Department:  [facility]: [phone]  [facility]: [phone]                     Pending Results     Date and Time Order Name Status Description    2018 1352 Stone analysis In process             Admission Information     Date & Time Provider Department Dept. Phone    2018 Terrell Forman MD [facility] PACU [phone]      Your Vitals  "Were     Blood Pressure Pulse Temperature Respirations Height Weight    125/70 64 97.7  F (36.5  C) (Oral) 12 1.626 m (5' 4.02\") 71 kg (156 lb 8.4 oz)    Pulse Oximetry BMI (Body Mass Index)                95% 26.85 kg/m2          MyChart Information     [facility] gives you secure access to your electronic health record. If you see a primary care provider, you can also send messages to your care team and make appointments. If you have questions, please call your primary care clinic.  If you do not have a primary care provider, please call [phone] and they will assist you.        Care EveryWhere ID     This is your Care EveryWhere ID. This could be used by other organizations to access your [facility] medical records  [id number]        Equal Access to Services     RON BROWN : Efrain Alvarez, diandra acevedo, marixa beyer, colleen mccabe. So [facility] [phone].    ATENCIÓN: Si habla español, tiene a lemus disposición servicios gratuitos de asistencia lingüística. Llame al [phone].    We comply with applicable federal civil rights laws and [place] laws. We do not discriminate on the basis of race, color, national origin, age, disability, sex, sexual orientation, or gender identity.               Review of your medicines      START taking        Dose / Directions    oxybutynin 5 MG 24 hr tablet   Commonly known as:  DITROPAN XL   Used for:  Ureteral stone        Dose:  5 mg   Take 1 tablet (5 mg) by mouth daily   Quantity:  30 tablet   Refills:  1       oxyCODONE IR 5 MG tablet   Commonly known as:  ROXICODONE   Used for:  Ureteral stone        Dose:  5 mg   Take 1 tablet (5 mg) by mouth every 6 hours as needed for severe pain   Quantity:  10 tablet   Refills:  0       phenazopyridine 97.5 MG tablet   Commonly known as:  AZO   Used for:  Ureteral stone        Dose:  195 mg   Take 2 tablets (195 mg) by mouth 3 times daily   Quantity:  90 tablet   Refills:  1 "       sulfamethoxazole-trimethoprim 400-80 MG per tablet   Commonly known as:  BACTRIM/SEPTRA   Used for:  Ureteral stone        Dose:  1 tablet   Take 1 tablet by mouth 2 times daily   Quantity:  14 tablet   Refills:  0       tamsulosin 0.4 MG capsule   Commonly known as:  FLOMAX   Used for:  Ureteral stone        Dose:  0.4 mg   Take 1 capsule (0.4 mg) by mouth daily   Quantity:  30 capsule   Refills:  1         CONTINUE these medicines which have NOT CHANGED        Dose / Directions    aspirin 81 MG tablet        Dose:  81 mg   Take 81 mg by mouth   Refills:  0       * atorvastatin 80 MG tablet   Commonly known as:  LIPITOR        Dose:  80 mg   Take 80 mg by mouth   Refills:  0       * atorvastatin 80 MG tablet   Commonly known as:  LIPITOR        Refills:  0       * clopidogrel 75 MG tablet   Commonly known as:  PLAVIX        Dose:  75 mg   Take 75 mg by mouth   Refills:  0       * clopidogrel 75 MG tablet   Commonly known as:  PLAVIX        Refills:  0       metoprolol succinate 25 MG 24 hr tablet   Commonly known as:  TOPROL-XL        TAKE 1/2 TABLET ONCE DAILY - DO NOT CRUSH OR CHEW   Refills:  0       nitroGLYcerin 0.4 MG sublingual tablet   Commonly known as:  NITROSTAT        Dose:  0.4 mg   Place 0.4 mg under the tongue   Refills:  0       polyethylene glycol powder   Commonly known as:  MIRALAX/GLYCOLAX        Refills:  0       * Notice:  This list has 4 medication(s) that are the same as other medications prescribed for you. Read the directions carefully, and ask your doctor or other care provider to review them with you.         Where to get your medicines      These medications were sent to [facility] [place], [place] - [address]  [address] [address]     Phone:  [phone]     oxybutynin 5 MG 24 hr tablet    phenazopyridine 97.5 MG tablet    sulfamethoxazole-trimethoprim 400-80 MG per tablet    tamsulosin 0.4 MG capsule         Some of these will need  a paper prescription and others can be bought over the counter. Ask your nurse if you have questions.     Bring a paper prescription for each of these medications     oxyCODONE IR 5 MG tablet                Protect others around you: Learn how to safely use, store and throw away your medicines at www.disposemymeds.org.        ANTIBIOTIC INSTRUCTION     You've Been Prescribed an Antibiotic - Now What?  Your healthcare team thinks that you or your loved one might have an infection. Some infections can be treated with antibiotics, which are powerful, life-saving drugs. Like all medications, antibiotics have side effects and should only be used when necessary. There are some important things you should know about your antibiotic treatment.      Your healthcare team may run tests before you start taking an antibiotic.    Your team may take samples (e.g., from your blood, urine or other areas) to run tests to look for bacteria. These test can be important to determine if you need an antibiotic at all and, if you do, which antibiotic will work best.      Within a few days, your healthcare team might change or even stop your antibiotic.    Your team may start you on an antibiotic while they are working to find out what is making you sick.    Your team might change your antibiotic because test results show that a different antibiotic would be better to treat your infection.    In some cases, once your team has more information, they learn that you do not need an antibiotic at all. They may find out that you don't have an infection, or that the antibiotic you're taking won't work against your infection. For example, an infection caused by a virus can't be treated with antibiotics. Staying on an antibiotic when you don't need it is more likely to be harmful than helpful.      You may experience side effects from your antibiotic.    Like all medications, antibiotics have side effects. Some of these can be serious.    Let you  healthcare team know if you have any known allergies when you are admitted to the hospital.    One significant side effect of nearly all antibiotics is the risk of severe and sometimes deadly diarrhea caused by Clostridium difficile (C. Difficile). This occurs when a person takes antibiotics because some good germs are destroyed. Antibiotic use allows C. diificile to take over, putting patients at high risk for this serious infection.    As a patient or caregiver, it is important to understand your or your loved one's antibiotic treatment. It is especially important for caregivers to speak up when patients can't speak for themselves. Here are some important questions to ask your healthcare team.    What infection is this antibiotic treating and how do you know I have that infection?    What side effects might occur from this antibiotic?    How long will I need to take this antibiotic?    Is it safe to take this antibiotic with other medications or supplements (e.g., vitamins) that I am taking?     Are there any special directions I need to know about taking this antibiotic? For example, should I take it with food?    How will I be monitored to know whether my infection is responding to the antibiotic?    What tests may help to make sure the right antibiotic is prescribed for me?      Information provided by:  www.cdc.gov/getsmart  U.S. Department of Health and Human Services  Centers for disease Control and Prevention  National Center for Emerging and Zoonotic Infectious Diseases  Division of Healthcare Quality Promotion        Information about OPIOIDS     PRESCRIPTION OPIOIDS: WHAT YOU NEED TO KNOW   We gave you an opioid (narcotic) pain medicine. It is important to manage your pain, but opioids are not always the best choice. You should first try all the other options your care team gave you. Take this medicine for as short a time (and as few doses) as possible.    Some activities can increase your pain, such as  bandage changes or therapy sessions. It may help to take your pain medicine 30 to 60 minutes before these activities. Reduce your stress by getting enough sleep, working on hobbies you enjoy and practicing relaxation or meditation. Talk to your care team about ways to manage your pain beyond prescription opioids.    These medicines have risks:    DO NOT drive when on new or higher doses of pain medicine. These medicines can affect your alertness and reaction times, and you could be arrested for driving under the influence (DUI). If you need to use opioids long-term, talk to your care team about driving.    DO NOT operate heavy machinery    DO NOT do any other dangerous activities while taking these medicines.    DO NOT drink any alcohol while taking these medicines.     If the opioid prescribed includes acetaminophen, DO NOT take with any other medicines that contain acetaminophen. Read all labels carefully. Look for the word  acetaminophen  or  Tylenol.  Ask your pharmacist if you have questions or are unsure.    You can get addicted to pain medicines, especially if you have a history of addiction (chemical, alcohol or substance dependence). Talk to your care team about ways to reduce this risk.    All opioids tend to cause constipation. Drink plenty of water and eat foods that have a lot of fiber, such as fruits, vegetables, prune juice, apple juice and high-fiber cereal. Take a laxative (Miralax, milk of magnesia, Colace, Senna) if you don t move your bowels at least every other day. Other side effects include upset stomach, sleepiness, dizziness, throwing up, tolerance (needing more of the medicine to have the same effect), physical dependence and slowed breathing.    Store your pills in a secure place, locked if possible. We will not replace any lost or stolen medicine. If you don t finish your medicine, please throw away (dispose) as directed by your pharmacist. The [place] Pollution Control Agency has more  information about safe disposal: https://www.pca.[facility].[place].us/living-green/managing-unwanted-medications             Medication List: This is a list of all your medications and when to take them. Check marks below indicate your daily home schedule. Keep this list as a reference.      Medications           Morning Afternoon Evening Bedtime As Needed    aspirin 81 MG tablet   Take 81 mg by mouth                                * atorvastatin 80 MG tablet   Commonly known as:  LIPITOR   Take 80 mg by mouth                                * atorvastatin 80 MG tablet   Commonly known as:  LIPITOR                                * clopidogrel 75 MG tablet   Commonly known as:  PLAVIX   Take 75 mg by mouth                                * clopidogrel 75 MG tablet   Commonly known as:  PLAVIX                                metoprolol succinate 25 MG 24 hr tablet   Commonly known as:  TOPROL-XL   TAKE 1/2 TABLET ONCE DAILY - DO NOT CRUSH OR CHEW                                nitroGLYcerin 0.4 MG sublingual tablet   Commonly known as:  NITROSTAT   Place 0.4 mg under the tongue                                oxybutynin 5 MG 24 hr tablet   Commonly known as:  DITROPAN XL   Take 1 tablet (5 mg) by mouth daily                                oxyCODONE IR 5 MG tablet   Commonly known as:  ROXICODONE   Take 1 tablet (5 mg) by mouth every 6 hours as needed for severe pain                                phenazopyridine 97.5 MG tablet   Commonly known as:  AZO   Take 2 tablets (195 mg) by mouth 3 times daily                                polyethylene glycol powder   Commonly known as:  MIRALAX/GLYCOLAX                                sulfamethoxazole-trimethoprim 400-80 MG per tablet   Commonly known as:  BACTRIM/SEPTRA   Take 1 tablet by mouth 2 times daily                                tamsulosin 0.4 MG capsule   Commonly known as:  FLOMAX   Take 1 capsule (0.4 mg) by mouth daily                                * Notice:  This list has  4 medication(s) that are the same as other medications prescribed for you. Read the directions carefully, and ask your doctor or other care provider to review them with you.

## 2018-08-23 NOTE — ANESTHESIA CARE TRANSFER NOTE
Patient: Nash De Leon    Procedure(s):  Cystoscopy, Left Ueteroscopy with Holmium Laser Lithotripsy, Left Stent Placement - Wound Class: II-Clean Contaminated    Diagnosis: Left Ureteral Stone, Calculus Of Kidney   Diagnosis Additional Information: No value filed.    Anesthesia Type:   General, ETT     Note:  Airway :Face Mask  Patient transferred to:PACU  Handoff Report: Identifed the Patient, Identified the Reponsible Provider, Reviewed the pertinent medical history, Discussed the surgical course, Reviewed Intra-OP anesthesia mangement and issues during anesthesia, Set expectations for post-procedure period and Allowed opportunity for questions and acknowledgement of understanding      Vitals: (Last set prior to Anesthesia Care Transfer)    CRNA VITALS  8/23/2018 1343 - 8/23/2018 1418      8/23/2018             Pulse: 93    SpO2: 100 %    Resp Rate (observed): (!)  1                Electronically Signed By: MARYAM Patrick CRNA  August 23, 2018  2:18 PM

## 2018-08-24 ENCOUNTER — CARE COORDINATION (OUTPATIENT)
Dept: UROLOGY | Facility: CLINIC | Age: 68
End: 2018-08-24

## 2018-08-24 ENCOUNTER — TELEPHONE (OUTPATIENT)
Dept: UROLOGY | Facility: CLINIC | Age: 68
End: 2018-08-24

## 2018-08-24 DIAGNOSIS — N20.0 CALCULUS OF KIDNEY: Primary | ICD-10-CM

## 2018-08-24 NOTE — OP NOTE
OPERATIVE REPORT    PREOPERATIVE DIAGNOSIS:  Left Renal and Ureteral Stone     POSTOPERATIVE DIAGNOSIS:  Same    PROCEDURES PERFORMED:   1. Cystoscopy  2. Left Ureteroscopy with holmium laser lithotripsy and stone basket extraction  3. Left Retrograde pyelogram with interpretation of intraoperative images  4. Left ureteral stent exchange    STAFF SURGEON: Terrell Forman, present and participatory for the entire case.   RESIDENT(S): Linda Sterling MD  ANESTHESIA: General    ESTIMATED BLOOD LOSS: <5 cc    DRAINS/TUBES:   7 x 24 double J left ureteral stent    IV FLUIDS: Please see dictated anesthesia record    COMPLICATIONS: None.     SPECIMEN:   Left renal stone for analysis    SIGNIFICANT FINDINGS: 2 cm left proximal ureteral stone, left lower pole renal stone   Maximum Stone Size 2 cm left UPJ  Retrograde Pyelogram Hydronephrptic, bifid kidney.    CASE COMPLEXITIES: Markedly more challenging than standard ureteroscopy due to the large stone size (2cm) and obligate need to perform procedure while on anticoagulation (plavix/aspirin) for recent cardiac stent placement.  This made stone fragmentation and basket extraction more challenging and obscured visualization due to ongoing friability and bleeding which was not severe but did obscure visualization.  The case took twice as long as typical ureteroscopic stone treatment as a result and a secondary procedure will be necessary at a later date to confirm maximal stone reduction/removal.    BRIEF OPERATIVE INDICATIONS:  Nash De Leon is a(n) 67 year old male who presented with a large obstructing left UPJ stone shortly after cardiac stent.  He was managed with a stent and returns today for definitive stone removal.After a discussion of all risks, benefits, and alternatives, the patient elected to proceed with definitive stone management with a ureteroscopic approach as he was deemed high risk to stop plavix at this time.    After induction of general anesthesia  the patient was repositioned in dorsal lithotomy and prepped and draped in the standard sterile fashion.  A time out was performed confirming the appropriate patient identity and planned procedure.  The patient received culture directed antibiotics and had bilateral sequential compression devices for DVT propylaxis.    A 20-Guyanese rigid cystoscope was inserted into a well-lubricated urethra. The urethra was notable for a bulbar stricture that was able to be traversed with the scope. The previous indwelling ureteral stent was identified and removed with the flexible stent grasper to the level of the urethral meatus.  A sensor wire was advanced up to the renal pelvis under fluoroscopic guidance and previous stent discarded.    A flexible ureteroscope was introduced and advanced adjacent to the wire up the ureter until we encountered the stone at the UPJ.  We gently pushed it up into the pelvis and confirmed the ureter was free of any other stones.  Next we passed a 13 dilator of a 46 cm ureteral access sheath which went up to the kidney with no resistance.  We next passed the complete 13/15 46 cm sheath over the wire and into the kidney.  We looked in with the flexible ureteroscope and identified the very large stone.  WE then began laser lithotripsy with a 200 micron laser.  We started by dusting the stone at a setting of 0.4J and 40 Hz.  The outer layer of the stone dusted into fine fragments well.  We next encountered a much harder, dense stone core and switched laser settings to 1.0 J and 10Hz.  We sufficiently fragmented the remaining stone and then pulled fragments out with a tipless 1.5F stone basket.  During the procedure the mucosa began to ooze and visualization became obscured to the point that we did not feel it was safe or appropriate to proceed and that a secondary procedure would be warranted after providing some time for some of the stone debris to clear.  We performed pullback ureteroscopy.  There was  a focal mucosal tear along the medial side of the proximal ureter that was superficial and did not involve fat.   We performed a retrograde pyelogram directly over this site as well as the kidney and there was no extravasation.   We next replaced the wire and passed a 7 x 24 double J stent which we confirmed had full curls proximally and distally.  We emptied the bladder.    The patient tolerated the procedure well and there were no apparent complications. The patient  was transported to the postanesthesia care unit in stable condition.     POSTOP PLAN:  -Return to OR for secondary ureteroscopy in 2-4 weeks    Terrell Forman

## 2018-08-24 NOTE — TELEPHONE ENCOUNTER
Patient is scheduled for surgery with Dr. Forman      Spoke or left message with: Nash    Date of Surgery: 9/13/18    Location: [facility] OR    Pre-op with surgeon (if applicable): n/a    H&P: Scheduled with pcp    Informed patient they will need an adult  yes    Additional imaging/appointments: n/a    Surgery packet: mailed 8/24/18    Additional comments: left voice mail with direct phone number [phone] for patient to call back to confirm.

## 2018-08-26 LAB
APPEARANCE STONE: NORMAL
COMPN STONE: NORMAL
NUMBER STONE: NORMAL
SIZE STONE: NORMAL MM
WT STONE: 378 MG

## 2018-08-27 ENCOUNTER — CARE COORDINATION (OUTPATIENT)
Dept: UROLOGY | Facility: CLINIC | Age: 68
End: 2018-08-27

## 2018-08-27 NOTE — PROGRESS NOTES
POST OP~Called and spoke with wife. Tai is tired and is really not liking the catheter. They are planning on having the catheter removed tomorrow at their clinic in [facility]. I asked that she give me a call tomorrow with an update on how is feeling. I gave her my direct line and she agreed to call me in the morning. Jessa Resendez RN

## 2018-08-27 NOTE — PROGRESS NOTES
Called and spoke with pts wife. Dr. Forman gave the OK for the to remove the catheter at home tomorrow morning. I asked that she call me to confirm once catheter has been removed. Jessa Resendez RN

## 2018-09-10 ENCOUNTER — TELEPHONE (OUTPATIENT)
Dept: UROLOGY | Facility: CLINIC | Age: 68
End: 2018-09-10

## 2018-09-10 NOTE — TELEPHONE ENCOUNTER
Called to inform patient of surgery location and time change from [place] [facility] to [facility] on 9/13/18 @ 1:15 pm.  Left voice mail with direct phone number [phone] for patient to call back to confirm.

## 2018-09-11 NOTE — OR NURSING
Contacted Jessa Resendez RN for Dr. Forman re: patient on Aspirin and Plavix. Per Jessa, she checked with Dr. Forman and he is ok for patient to continue on Plavix and Aspirin before his surgery on 9/13/18.

## 2018-09-12 ENCOUNTER — ANESTHESIA EVENT (OUTPATIENT)
Dept: SURGERY | Facility: CLINIC | Age: 68
End: 2018-09-12
Payer: COMMERCIAL

## 2018-09-13 ENCOUNTER — ANESTHESIA (OUTPATIENT)
Dept: SURGERY | Facility: CLINIC | Age: 68
End: 2018-09-13
Payer: COMMERCIAL

## 2018-09-13 ENCOUNTER — APPOINTMENT (OUTPATIENT)
Dept: GENERAL RADIOLOGY | Facility: CLINIC | Age: 68
End: 2018-09-13
Attending: UROLOGY
Payer: COMMERCIAL

## 2018-09-13 ENCOUNTER — HOSPITAL ENCOUNTER (OUTPATIENT)
Facility: CLINIC | Age: 68
Discharge: HOME | End: 2018-09-13
Attending: UROLOGY | Admitting: UROLOGY
Payer: COMMERCIAL

## 2018-09-13 ENCOUNTER — SURGERY (OUTPATIENT)
Age: 68
End: 2018-09-13

## 2018-09-13 VITALS
DIASTOLIC BLOOD PRESSURE: 70 MMHG | BODY MASS INDEX: 27.06 KG/M2 | HEIGHT: 64 IN | SYSTOLIC BLOOD PRESSURE: 133 MMHG | WEIGHT: 158.51 LBS | RESPIRATION RATE: 20 BRPM | TEMPERATURE: 98.2 F | OXYGEN SATURATION: 98 %

## 2018-09-13 DIAGNOSIS — N40.1 BENIGN PROSTATIC HYPERPLASIA WITH LOWER URINARY TRACT SYMPTOMS, SYMPTOM DETAILS UNSPECIFIED: ICD-10-CM

## 2018-09-13 DIAGNOSIS — Z79.2 NEED FOR PROPHYLACTIC ANTIBIOTIC: Primary | ICD-10-CM

## 2018-09-13 LAB — GLUCOSE BLDC GLUCOMTR-MCNC: 91 MG/DL (ref 70–99)

## 2018-09-13 PROCEDURE — 25500064 ZZH RX 255 OP 636: Performed by: UROLOGY

## 2018-09-13 PROCEDURE — C1769 GUIDE WIRE: HCPCS | Performed by: UROLOGY

## 2018-09-13 PROCEDURE — 82962 GLUCOSE BLOOD TEST: CPT

## 2018-09-13 PROCEDURE — 37000009 ZZH ANESTHESIA TECHNICAL FEE, EACH ADDTL 15 MIN: Performed by: UROLOGY

## 2018-09-13 PROCEDURE — 36000059 ZZH SURGERY LEVEL 3 EA 15 ADDTL MIN UMMC: Performed by: UROLOGY

## 2018-09-13 PROCEDURE — 25000566 ZZH SEVOFLURANE, EA 15 MIN: Performed by: UROLOGY

## 2018-09-13 PROCEDURE — 74019 RADEX ABDOMEN 2 VIEWS: CPT

## 2018-09-13 PROCEDURE — 40000170 ZZH STATISTIC PRE-PROCEDURE ASSESSMENT II: Performed by: UROLOGY

## 2018-09-13 PROCEDURE — 25000128 H RX IP 250 OP 636: Performed by: NURSE ANESTHETIST, CERTIFIED REGISTERED

## 2018-09-13 PROCEDURE — 27210794 ZZH OR GENERAL SUPPLY STERILE: Performed by: UROLOGY

## 2018-09-13 PROCEDURE — 71000014 ZZH RECOVERY PHASE 1 LEVEL 2 FIRST HR: Performed by: UROLOGY

## 2018-09-13 PROCEDURE — 25000128 H RX IP 250 OP 636: Performed by: UROLOGY

## 2018-09-13 PROCEDURE — 36000061 ZZH SURGERY LEVEL 3 W FLUORO 1ST 30 MIN - UMMC: Performed by: UROLOGY

## 2018-09-13 PROCEDURE — 82365 CALCULUS SPECTROSCOPY: CPT | Performed by: UROLOGY

## 2018-09-13 PROCEDURE — 25000125 ZZHC RX 250: Performed by: NURSE ANESTHETIST, CERTIFIED REGISTERED

## 2018-09-13 PROCEDURE — 71000027 ZZH RECOVERY PHASE 2 EACH 15 MINS: Performed by: UROLOGY

## 2018-09-13 PROCEDURE — C2617 STENT, NON-COR, TEM W/O DEL: HCPCS | Performed by: UROLOGY

## 2018-09-13 PROCEDURE — 37000008 ZZH ANESTHESIA TECHNICAL FEE, 1ST 30 MIN: Performed by: UROLOGY

## 2018-09-13 PROCEDURE — 40000278 XR SURGERY CARM FLUORO LESS THAN 5 MIN: Mod: TC

## 2018-09-13 DEVICE — STENT URETERAL PERCUFLEX PLUS 7FRX26CM M0061752730: Type: IMPLANTABLE DEVICE | Site: KIDNEY | Status: FUNCTIONAL

## 2018-09-13 RX ORDER — SODIUM CHLORIDE, SODIUM LACTATE, POTASSIUM CHLORIDE, CALCIUM CHLORIDE 600; 310; 30; 20 MG/100ML; MG/100ML; MG/100ML; MG/100ML
INJECTION, SOLUTION INTRAVENOUS CONTINUOUS PRN
Status: DISCONTINUED | OUTPATIENT
Start: 2018-09-13 | End: 2018-09-13

## 2018-09-13 RX ORDER — ONDANSETRON 2 MG/ML
4 INJECTION INTRAMUSCULAR; INTRAVENOUS EVERY 30 MIN PRN
Status: DISCONTINUED | OUTPATIENT
Start: 2018-09-13 | End: 2018-09-18 | Stop reason: HOSPADM

## 2018-09-13 RX ORDER — ONDANSETRON 4 MG/1
4 TABLET, ORALLY DISINTEGRATING ORAL EVERY 30 MIN PRN
Status: DISCONTINUED | OUTPATIENT
Start: 2018-09-13 | End: 2018-09-18 | Stop reason: HOSPADM

## 2018-09-13 RX ORDER — SODIUM CHLORIDE, SODIUM LACTATE, POTASSIUM CHLORIDE, CALCIUM CHLORIDE 600; 310; 30; 20 MG/100ML; MG/100ML; MG/100ML; MG/100ML
INJECTION, SOLUTION INTRAVENOUS CONTINUOUS
Status: DISCONTINUED | OUTPATIENT
Start: 2018-09-13 | End: 2018-09-18 | Stop reason: HOSPADM

## 2018-09-13 RX ORDER — FENTANYL CITRATE 50 UG/ML
25-50 INJECTION, SOLUTION INTRAMUSCULAR; INTRAVENOUS
Status: DISCONTINUED | OUTPATIENT
Start: 2018-09-13 | End: 2018-09-18 | Stop reason: HOSPADM

## 2018-09-13 RX ORDER — LIDOCAINE HYDROCHLORIDE 20 MG/ML
INJECTION, SOLUTION INFILTRATION; PERINEURAL PRN
Status: DISCONTINUED | OUTPATIENT
Start: 2018-09-13 | End: 2018-09-13

## 2018-09-13 RX ORDER — DEXAMETHASONE SODIUM PHOSPHATE 4 MG/ML
INJECTION, SOLUTION INTRA-ARTICULAR; INTRALESIONAL; INTRAMUSCULAR; INTRAVENOUS; SOFT TISSUE PRN
Status: DISCONTINUED | OUTPATIENT
Start: 2018-09-13 | End: 2018-09-13

## 2018-09-13 RX ORDER — NALOXONE HYDROCHLORIDE 0.4 MG/ML
.1-.4 INJECTION, SOLUTION INTRAMUSCULAR; INTRAVENOUS; SUBCUTANEOUS
Status: ACTIVE | OUTPATIENT
Start: 2018-09-13 | End: 2018-09-14

## 2018-09-13 RX ORDER — TAMSULOSIN HYDROCHLORIDE 0.4 MG/1
0.4 CAPSULE ORAL DAILY
Qty: 90 CAPSULE | Refills: 3 | Status: SHIPPED | OUTPATIENT
Start: 2018-09-13 | End: 2018-11-20

## 2018-09-13 RX ORDER — PHENYLEPHRINE HCL IN 0.9% NACL 1 MG/10 ML
SYRINGE (ML) INTRAVENOUS PRN
Status: DISCONTINUED | OUTPATIENT
Start: 2018-09-13 | End: 2018-09-13

## 2018-09-13 RX ORDER — HYDROMORPHONE HYDROCHLORIDE 1 MG/ML
.3-.5 INJECTION, SOLUTION INTRAMUSCULAR; INTRAVENOUS; SUBCUTANEOUS EVERY 10 MIN PRN
Status: DISCONTINUED | OUTPATIENT
Start: 2018-09-13 | End: 2018-09-18 | Stop reason: HOSPADM

## 2018-09-13 RX ORDER — FENTANYL CITRATE 50 UG/ML
25-50 INJECTION, SOLUTION INTRAMUSCULAR; INTRAVENOUS
Status: DISCONTINUED | OUTPATIENT
Start: 2018-09-13 | End: 2018-09-13 | Stop reason: HOSPADM

## 2018-09-13 RX ORDER — ONDANSETRON 2 MG/ML
INJECTION INTRAMUSCULAR; INTRAVENOUS PRN
Status: DISCONTINUED | OUTPATIENT
Start: 2018-09-13 | End: 2018-09-13

## 2018-09-13 RX ORDER — OXYCODONE HYDROCHLORIDE 5 MG/1
5 TABLET ORAL EVERY 4 HOURS PRN
Status: DISCONTINUED | OUTPATIENT
Start: 2018-09-13 | End: 2018-09-18 | Stop reason: HOSPADM

## 2018-09-13 RX ORDER — AMPICILLIN 2 G/1
2 INJECTION, POWDER, FOR SOLUTION INTRAVENOUS EVERY 6 HOURS
Status: DISCONTINUED | OUTPATIENT
Start: 2018-09-13 | End: 2018-09-13 | Stop reason: HOSPADM

## 2018-09-13 RX ORDER — CIPROFLOXACIN 500 MG/1
500 TABLET, FILM COATED ORAL 2 TIMES DAILY
Qty: 6 TABLET | Refills: 0 | Status: SHIPPED | OUTPATIENT
Start: 2018-09-13 | End: 2018-11-20

## 2018-09-13 RX ORDER — PROPOFOL 10 MG/ML
INJECTION, EMULSION INTRAVENOUS PRN
Status: DISCONTINUED | OUTPATIENT
Start: 2018-09-13 | End: 2018-09-13

## 2018-09-13 RX ADMIN — LIDOCAINE HYDROCHLORIDE 80 MG: 20 INJECTION, SOLUTION INFILTRATION; PERINEURAL at 13:54

## 2018-09-13 RX ADMIN — DEXAMETHASONE SODIUM PHOSPHATE 8 MG: 4 INJECTION, SOLUTION INTRAMUSCULAR; INTRAVENOUS at 14:05

## 2018-09-13 RX ADMIN — GENTAMICIN SULFATE 280 MG: 40 INJECTION, SOLUTION INTRAMUSCULAR; INTRAVENOUS at 14:02

## 2018-09-13 RX ADMIN — ONDANSETRON 4 MG: 2 INJECTION INTRAMUSCULAR; INTRAVENOUS at 14:59

## 2018-09-13 RX ADMIN — AMPICILLIN SODIUM 2 G: 2 INJECTION, POWDER, FOR SOLUTION INTRAMUSCULAR; INTRAVENOUS at 14:02

## 2018-09-13 RX ADMIN — IOTHALAMATE MEGLUMINE 15 ML: 600 INJECTION INTRAVASCULAR at 15:01

## 2018-09-13 RX ADMIN — Medication 50 MCG: at 14:07

## 2018-09-13 RX ADMIN — SODIUM CHLORIDE, POTASSIUM CHLORIDE, SODIUM LACTATE AND CALCIUM CHLORIDE: 600; 310; 30; 20 INJECTION, SOLUTION INTRAVENOUS at 15:06

## 2018-09-13 RX ADMIN — Medication 100 MCG: at 14:15

## 2018-09-13 RX ADMIN — SODIUM CHLORIDE, POTASSIUM CHLORIDE, SODIUM LACTATE AND CALCIUM CHLORIDE: 600; 310; 30; 20 INJECTION, SOLUTION INTRAVENOUS at 13:44

## 2018-09-13 RX ADMIN — PROPOFOL 150 MG: 10 INJECTION, EMULSION INTRAVENOUS at 13:54

## 2018-09-13 NOTE — IP AVS SNAPSHOT
[facility] PREOP/PHASE II    [address]    [place] [address]    Phone:  [phone]                                       After Visit Summary   9/13/2018    Nash De Leon    MRN: [id number]           After Visit Summary Signature Page     I have received my discharge instructions, and my questions have been answered. I have discussed any challenges I see with this plan with the nurse or doctor.    ..........................................................................................................................................  Patient/Patient Representative Signature      ..........................................................................................................................................  Patient Representative Print Name and Relationship to Patient    ..................................................               ................................................  Date                                   Time    ..........................................................................................................................................  Reviewed by Signature/Title    ...................................................              ..............................................  Date                                               Time          22EPIC Rev 08/18

## 2018-09-13 NOTE — OR NURSING
Tai had surgery a few weeks ago. He was discharged without a huynh in. He drove home. When he reached [facility] he could not void. He was in pain and eventually went to an ER. A huynh was placed for 5 days.

## 2018-09-13 NOTE — ANESTHESIA CARE TRANSFER NOTE
Patient: Nash De Leon    Procedure(s):  Cystoscopy, Left Ureteroscopy with Stone Basketing, Left Stent Exchange  - Wound Class: II-Clean Contaminated    Diagnosis: Ureteral Stone, Calculus Of Kidney   Diagnosis Additional Information: No value filed.    Anesthesia Type:   General, LMA     Note:  Airway :Face Mask  Patient transferred to:PACU  Comments: Arrived in PACU, report to RN, vitals stable, patient comfortable.  Handoff Report: Identifed the Patient, Identified the Reponsible Provider, Reviewed the pertinent medical history, Discussed the surgical course, Reviewed Intra-OP anesthesia mangement and issues during anesthesia, Set expectations for post-procedure period and Allowed opportunity for questions and acknowledgement of understanding      Vitals: (Last set prior to Anesthesia Care Transfer)    CRNA VITALS  9/13/2018 1440 - 9/13/2018 1520      9/13/2018             Resp Rate (observed): (!)  1                Electronically Signed By: MARYAM Viveros CRNA  September 13, 2018  3:20 PM

## 2018-09-13 NOTE — BRIEF OP NOTE
[facility], [employer]    Brief Operative Note    Pre-operative diagnosis: Ureteral Stone, Calculus Of Kidney   Post-operative diagnosis * No post-op diagnosis entered *  Procedure: Procedure(s):  Cystoscopy, Left Ureteroscopy with Stone Basketing, Left Stent Exchange  - Wound Class: II-Clean Contaminated  Surgeon: Surgeon(s) and Role:     * Terrell Forman MD - Primary     * Cristhian Saleh MD - Resident - Assisting  Anesthesia: General   Estimated blood loss: Minimal  Drains: 7 x 26 JJ stent, 16-Fr coude catheter with 10cc in balloon  Specimens:   ID Type Source Tests Collected by Time Destination   1 : Left kidney stone Calculus/Stone Kidney, Left STONE ANALYSIS Terrell Forman MD 9/13/2018  2:44 PM      Findings:   Visually stone free of all fragments > 1 mm .  Complications: None.  Implants: None.

## 2018-09-13 NOTE — DISCHARGE INSTRUCTIONS
Same-Day Surgery   Adult Discharge Orders & Instructions     For 24 hours after surgery:  1. Get plenty of rest.  A responsible adult must stay with you for at least 24 hours after you leave the hospital.   2. Pain medication can slow your reflexes. Do not drive or use heavy equipment.  If you have weakness or tingling, don't drive or use heavy equipment until this feeling goes away.  3. Mixing alcohol and pain medication can cause dizziness and slow your breathing. It can even be fatal. Do not drink alcohol while taking pain medication.  4. Avoid strenuous or risky activities.  Ask for help when climbing stairs.   5. You may feel lightheaded.  If so, sit for a few minutes before standing.  Have someone help you get up.   6. If you have nausea (feel sick to your stomach), drink only clear liquids such as apple juice, ginger ale, broth or 7-Up.  Rest may also help.  Be sure to drink enough fluids.  Move to a regular diet as you feel able. Take pain medications with a small amount of solid food, such as toast or crackers, to avoid nausea.   7. A slight fever is normal. Call the doctor if your fever is over 100 F (37.7 C) (taken under the tongue) or lasts longer than 24 hours.  8. You may have a dry mouth, muscle aches, trouble sleeping or a sore throat.  These symptoms should go away after 24 hours.  9. Do not make important or legal decisions.   Pain Management:      1. Take pain medication (if prescribed) for pain as directed by your physician.        2. WARNING: If the pain medication you have been prescribed contains Tylenol (acetaminophen), DO NOT take additional doses of Tylenol (acetaminophen).     Call your doctor for any of the followin.  Signs of infection (fever, growing tenderness at the surgery site, severe pain, a large amount of drainage or bleeding, foul-smelling drainage, redness, swelling).    2.  It has been over 8 to 10 hours since surgery and you are still not able to urinate (pee).    3.   Headache for over 24 hours.      To contact a doctor, call Dr. BRIE Forman's clinic #[phone] or:      [phone] and ask for the Resident On Call for: Urology (answered 24 hours a day)      Emergency Department:  [facility]: [phone]  [facility]: [phone]               Rev. 10/2014            Tips for taking pain medications  To get the best pain relief possible , remember these points:      Take pain medications as directed, before pain becomes severe    Pain medication can upset your stomach: taking it with food may help    Drink plenty of  Fluids    Ask about other ways to control pain, such as with heat, ice or relaxation.

## 2018-09-13 NOTE — IP AVS SNAPSHOT
MRN:[id number]                      After Visit Summary   9/13/2018    Nash De Leon    MRN: [id number]           Thank you!     Thank you for choosing [facility] for your care. Our goal is always to provide you with excellent care. Hearing back from our patients is one way we can continue to improve our services. Please take a few minutes to complete the written survey that you may receive in the mail after you visit with us. Thank you!        Patient Information     Date Of Birth          1950        About your hospital stay     You were admitted on:  September 13, 2018 You last received care in the:  UR PREOP/PHASE II    You were discharged on:  September 13, 2018       Who to Call     For medical emergencies, please call 911.  For non-urgent questions about your medical care, please call your primary care provider or clinic, [phone]  For questions related to your surgery, please call your surgery clinic        Attending Provider     Provider Terrell Perry MD Urology       Primary Care Provider Office Phone # Fax #    Ai FIORDALIZA Simon [phone]      After Care Instructions     Discharge Instructions       Activity  - Do not strain with bowel movements.  - Do not drive until you can press the brake pedal quickly and fully without pain.   - Do not operate a motor vehicle while taking narcotic pain medications.     Stents  - You have a left ureteral stent in place. Stents can cause some discomfort, including some blood in your urine (which is normal), the feeling or urge to urinate frequently, burning with urination and pressure/discomfort in your back while voiding. Stay well hydrated to keep your urine as clear as possible.    Medications  - Flomax - to decrease stent discomfort   - Transition from narcotic pain medications to tylenol (acetaminophen) as you are able.  Wean yourself off all pain medications as you are able.  - Some pain  "medications contain both tylenol (acetaminophen) and a narcotic (Norco, vicodin, percocet), do not take more than 4,000mg of Tylenol (acetaminophen) from all sources in any 24 hour period.  - Narcotics can make you constipated.  Take over the counter fiber (metamucil or benefiber) and stool softeners (miralax, docusate or senna) while taking narcotic pain medications, but stop if you develop diarrhea.  - No driving or operating machinery while taking narcotic pain medications     Follow-Up:  - Follow up with Dr. Forman as needed.  - You may remove huynh catheter and stent (which is attached) tomorrow  - Call your primary care provider to touch base regarding your recent procedure.   - Call or return sooner than your regularly scheduled visit if you develop any of the following: fever (greater than 101.5), uncontrolled pain, uncontrolled nausea or vomiting, as well as increased redness, swelling, or drainage from your wound.     Phone numbers:   - Monday through Friday 8am to 4:30pm: Call [phone] with questions or to schedule or confirm appointment.    - Nights or weekends: call the after hours emergency pager - [phone] and tell the  \"I would like to page the Urology Resident on call.\"  - For emergencies, call [phone]                  Further instructions from your care team       Same-Day Surgery   Adult Discharge Orders & Instructions     For 24 hours after surgery:  1. Get plenty of rest.  A responsible adult must stay with you for at least 24 hours after you leave the hospital.   2. Pain medication can slow your reflexes. Do not drive or use heavy equipment.  If you have weakness or tingling, don't drive or use heavy equipment until this feeling goes away.  3. Mixing alcohol and pain medication can cause dizziness and slow your breathing. It can even be fatal. Do not drink alcohol while taking pain medication.  4. Avoid strenuous or risky activities.  Ask for help when climbing stairs.   5. You " may feel lightheaded.  If so, sit for a few minutes before standing.  Have someone help you get up.   6. If you have nausea (feel sick to your stomach), drink only clear liquids such as apple juice, ginger ale, broth or 7-Up.  Rest may also help.  Be sure to drink enough fluids.  Move to a regular diet as you feel able. Take pain medications with a small amount of solid food, such as toast or crackers, to avoid nausea.   7. A slight fever is normal. Call the doctor if your fever is over 100 F (37.7 C) (taken under the tongue) or lasts longer than 24 hours.  8. You may have a dry mouth, muscle aches, trouble sleeping or a sore throat.  These symptoms should go away after 24 hours.  9. Do not make important or legal decisions.   Pain Management:      1. Take pain medication (if prescribed) for pain as directed by your physician.        2. WARNING: If the pain medication you have been prescribed contains Tylenol (acetaminophen), DO NOT take additional doses of Tylenol (acetaminophen).     Call your doctor for any of the followin.  Signs of infection (fever, growing tenderness at the surgery site, severe pain, a large amount of drainage or bleeding, foul-smelling drainage, redness, swelling).    2.  It has been over 8 to 10 hours since surgery and you are still not able to urinate (pee).    3.  Headache for over 24 hours.      To contact a doctor, call Dr. BRIE Forman's clinic #[phone] or:      [phone] and ask for the Resident On Call for: Urology (answered 24 hours a day)      Emergency Department:  [facility]: [phone]  [place] [facility]: [phone]               Rev. 10/2014            Tips for taking pain medications  To get the best pain relief possible , remember these points:      Take pain medications as directed, before pain becomes severe    Pain medication can upset your stomach: taking it with food may help    Drink plenty of  Fluids    Ask about other  "ways to control pain, such as with heat, ice or relaxation.      Pending Results     Date and Time Order Name Status Description    9/13/2018 1444 Stone analysis In process     9/13/2018 0947 XR KUB In process             Admission Information     Date & Time Provider Department Dept. Phone    9/13/2018 Terrell Forman MD UR PREOP/PHASE [phone]      Your Vitals Were     Blood Pressure Temperature Respirations Height Weight Pulse Oximetry    127/79 97.5  F (36.4  C) (Oral) 22 1.626 m (5' 4\") 71.9 kg (158 lb 8.2 oz) 99%    BMI (Body Mass Index)                   27.21 kg/m2           MyChart Information     [employer] gives you secure access to your electronic health record. If you see a primary care provider, you can also send messages to your care team and make appointments. If you have questions, please call your primary care clinic.  If you do not have a primary care provider, please call [phone] and they will assist you.        Care EveryWhere ID     This is your Care EveryWhere ID. This could be used by other organizations to access your [facility] medical records  [id number]        Equal Access to Services     RON BROWN AH: Hadii antoine Alvarez, wabartda kristina, qaybta kaalmada kayleen, colleen mccabe. So [facility] [phone].    ATENCIÓN: Si habla español, tiene a lemus disposición servicios gratuitos de asistencia lingüística. Llame al [phone].    We comply with applicable federal civil rights laws and [place] laws. We do not discriminate on the basis of race, color, national origin, age, disability, sex, sexual orientation, or gender identity.               Review of your medicines      START taking        Dose / Directions    ciprofloxacin 500 MG tablet   Commonly known as:  CIPRO   Used for:  Need for prophylactic antibiotic        Dose:  500 mg   Take 1 tablet (500 mg) by mouth 2 times daily   Quantity:  6 tablet   Refills:  0         CONTINUE these " medicines which may have CHANGED, or have new prescriptions. If we are uncertain of the size of tablets/capsules you have at home, strength may be listed as something that might have changed.        Dose / Directions    * tamsulosin 0.4 MG capsule   Commonly known as:  FLOMAX   This may have changed:  Another medication with the same name was added. Make sure you understand how and when to take each.   Used for:  Ureteral stone        Dose:  0.4 mg   Take 1 capsule (0.4 mg) by mouth daily   Quantity:  30 capsule   Refills:  1       * tamsulosin 0.4 MG capsule   Commonly known as:  FLOMAX   This may have changed:  You were already taking a medication with the same name, and this prescription was added. Make sure you understand how and when to take each.   Used for:  Benign prostatic hyperplasia with lower urinary tract symptoms, symptom details unspecified        Dose:  0.4 mg   Take 1 capsule (0.4 mg) by mouth daily   Quantity:  90 capsule   Refills:  3       * Notice:  This list has 2 medication(s) that are the same as other medications prescribed for you. Read the directions carefully, and ask your doctor or other care provider to review them with you.      CONTINUE these medicines which have NOT CHANGED        Dose / Directions    aspirin 81 MG tablet        Dose:  81 mg   Take 81 mg by mouth daily   Refills:  0       atorvastatin 80 MG tablet   Commonly known as:  LIPITOR        Dose:  80 mg   Take 80 mg by mouth daily   Refills:  0       clopidogrel 75 MG tablet   Commonly known as:  PLAVIX        Dose:  75 mg   Take 75 mg by mouth daily   Refills:  0       metoprolol succinate 25 MG 24 hr tablet   Commonly known as:  TOPROL-XL        TAKE 1/2 TABLET ONCE DAILY - DO NOT CRUSH OR CHEW   Refills:  0       nitroGLYcerin 0.4 MG sublingual tablet   Commonly known as:  NITROSTAT        Dose:  0.4 mg   Place 0.4 mg under the tongue every 5 minutes as needed   Refills:  0       oxybutynin 5 MG 24 hr tablet   Commonly  known as:  DITROPAN XL   Used for:  Ureteral stone        Dose:  5 mg   Take 1 tablet (5 mg) by mouth daily   Quantity:  30 tablet   Refills:  1       oxyCODONE IR 5 MG tablet   Commonly known as:  ROXICODONE   Used for:  Ureteral stone        Dose:  5 mg   Take 1 tablet (5 mg) by mouth every 6 hours as needed for severe pain   Quantity:  10 tablet   Refills:  0            Where to get your medicines      These medications were sent to [place], [place] - [address]  [address] [facility] [address], [facility] [address]     Phone:  [phone]     ciprofloxacin 500 MG tablet    tamsulosin 0.4 MG capsule                Protect others around you: Learn how to safely use, store and throw away your medicines at www.disposemymeds.org.        ANTIBIOTIC INSTRUCTION     You've Been Prescribed an Antibiotic - Now What?  Your healthcare team thinks that you or your loved one might have an infection. Some infections can be treated with antibiotics, which are powerful, life-saving drugs. Like all medications, antibiotics have side effects and should only be used when necessary. There are some important things you should know about your antibiotic treatment.      Your healthcare team may run tests before you start taking an antibiotic.    Your team may take samples (e.g., from your blood, urine or other areas) to run tests to look for bacteria. These test can be important to determine if you need an antibiotic at all and, if you do, which antibiotic will work best.      Within a few days, your healthcare team might change or even stop your antibiotic.    Your team may start you on an antibiotic while they are working to find out what is making you sick.    Your team might change your antibiotic because test results show that a different antibiotic would be better to treat your infection.    In some cases, once your team has more information, they learn that you do not need an antibiotic at all. They may  find out that you don't have an infection, or that the antibiotic you're taking won't work against your infection. For example, an infection caused by a virus can't be treated with antibiotics. Staying on an antibiotic when you don't need it is more likely to be harmful than helpful.      You may experience side effects from your antibiotic.    Like all medications, antibiotics have side effects. Some of these can be serious.    Let you healthcare team know if you have any known allergies when you are admitted to the hospital.    One significant side effect of nearly all antibiotics is the risk of severe and sometimes deadly diarrhea caused by Clostridium difficile (C. Difficile). This occurs when a person takes antibiotics because some good germs are destroyed. Antibiotic use allows C. diificile to take over, putting patients at high risk for this serious infection.    As a patient or caregiver, it is important to understand your or your loved one's antibiotic treatment. It is especially important for caregivers to speak up when patients can't speak for themselves. Here are some important questions to ask your healthcare team.    What infection is this antibiotic treating and how do you know I have that infection?    What side effects might occur from this antibiotic?    How long will I need to take this antibiotic?    Is it safe to take this antibiotic with other medications or supplements (e.g., vitamins) that I am taking?     Are there any special directions I need to know about taking this antibiotic? For example, should I take it with food?    How will I be monitored to know whether my infection is responding to the antibiotic?    What tests may help to make sure the right antibiotic is prescribed for me?      Information provided by:  www.cdc.gov/getsmart  U.S. Department of Health and Human Services  Centers for disease Control and Prevention  National Center for Emerging and Zoonotic Infectious  Diseases  Division of Healthcare Quality Promotion             Medication List: This is a list of all your medications and when to take them. Check marks below indicate your daily home schedule. Keep this list as a reference.      Medications           Morning Afternoon Evening Bedtime As Needed    aspirin 81 MG tablet   Take 81 mg by mouth daily                                atorvastatin 80 MG tablet   Commonly known as:  LIPITOR   Take 80 mg by mouth daily                                ciprofloxacin 500 MG tablet   Commonly known as:  CIPRO   Take 1 tablet (500 mg) by mouth 2 times daily                                clopidogrel 75 MG tablet   Commonly known as:  PLAVIX   Take 75 mg by mouth daily                                metoprolol succinate 25 MG 24 hr tablet   Commonly known as:  TOPROL-XL   TAKE 1/2 TABLET ONCE DAILY - DO NOT CRUSH OR CHEW                                nitroGLYcerin 0.4 MG sublingual tablet   Commonly known as:  NITROSTAT   Place 0.4 mg under the tongue every 5 minutes as needed                                oxybutynin 5 MG 24 hr tablet   Commonly known as:  DITROPAN XL   Take 1 tablet (5 mg) by mouth daily                                oxyCODONE IR 5 MG tablet   Commonly known as:  ROXICODONE   Take 1 tablet (5 mg) by mouth every 6 hours as needed for severe pain                                * tamsulosin 0.4 MG capsule   Commonly known as:  FLOMAX   Take 1 capsule (0.4 mg) by mouth daily                                * tamsulosin 0.4 MG capsule   Commonly known as:  FLOMAX   Take 1 capsule (0.4 mg) by mouth daily                                * Notice:  This list has 2 medication(s) that are the same as other medications prescribed for you. Read the directions carefully, and ask your doctor or other care provider to review them with you.              More Information        Discharge Instructions: Caring for Your Indwelling Urinary Catheter  You have been discharged with an  indwelling urinary catheter (also called a Chavez catheter). A catheter is a thin, flexible tube. An indwelling urinary catheter has two parts. The first part is a tube that drains urine from your bladder. The second part is a bag or other device that collects the urine.  The most important thing to remember is that you want to prevent infection. Always wash your hands before handling your catheter bag or tubing.  Draining the bedside bag    Wash your hands with soap and clean, running water or use an alcohol-based hand  that contains at least 60% alcohol.    Hold the drainage tube over a toilet or measuring container.    Unclamp the tube and let the bag drain.    Don t touch the tip of the drainage tube or let it touch the toilet or container.  Cleaning the drainage tube    When the bag is empty, clean the tip of the drainage tube with an alcohol wipe.    Clamp the tube.    Reinsert the tube into the pocket on the drainage bag.  Cleaning your skin and tubing    Clean the skin near the catheter with soap and water.    Wash your genital area from front to back.    Wash the catheter tubing. Always wash the catheter in the direction away from your body.    You will be told when and how to change your bag and tubing.    Don t try to remove the catheter by yourself.    You may shower with the catheter in place.  Emptying a leg bag    Wash your hands.    Remove the stopper on the bag.    Drain the bag into the toilet or a measuring container. Don t let the tip of the drainage tube touch anything, including your fingers.    Clean the tip of the drainage tube with alcohol.    Replace the stopper.  Follow-up care  Make a follow-up appointment as directed by your healthcare provider  When to call your healthcare provider  Call your healthcare provider right away if you have any of the following:    Chills or fever above 100.4 F (38 C)    Leakage around the catheter insertion site    Increased spasms (uncontrollable  twitching) in your legs, abdomen, or bladder. Occasional mild spasms are normal.    Burning in the urinary tract, penis, or genital area    Nausea and vomiting    Aching in the lower back    Cloudy or bloody (pink or red) urine, sediment or mucus in the urine, or foul-smelling urine   Date Last Reviewed: 1/1/2017 2000-2017 [employer] [address] [place] [address]. All rights reserved. This information is not intended as a substitute for professional medical care. Always follow your healthcare professional's instructions.                Chavez Catheter Removal     The syringe is put into the balloon port to allow the balloon to empty. Once the balloon is empty, the catheter can be removed.   Your healthcare provider has instructed you to remove your Chavez catheter. This is a thin, flexible tube that allows urine to drain out of your bladder and into a bag. It s important to properly remove your catheter to help prevent infection and other complications. If you have any questions about removing the Chavez catheter, ask your healthcare provider before trying to remove it. Otherwise, follow the instructions on this sheet.  Chavez catheter  The Chavez catheter is held in place by a small balloon that s filled with water. To remove the catheter, you must first drain the water from the balloon. This is done using a syringe and the balloon port. This is the opening in the catheter that isn t attached to the bag. It allows you to get to the balloon.  Instructions for removing the catheter  Follow the directions closely. Note: If the catheter doesn t come out with gentle pulling, stop and call your healthcare provider right away.    Empty the bag of urine if needed.    Wash your hands with soap and warm water. Dry them well.    Gather your supplies. This includes a syringe, wastebasket, a towel, and a syringe that was given to you by your healthcare provider.    Put the syringe into the balloon port  on the catheter. The syringe fits tightly into the port with a firm push and twist motion.    Wait as the water from the balloon empties into the syringe. Depending on how large the balloon is, you may need to repeat this process several times until all of the water is out of the balloon.    Once the balloon is emptied, gently pull out the catheter.    Put the used catheter in the wastebasket. Also throw away the syringe.    Use the towel to wipe up any spilled water or urine if needed.    Wash your hands again.  When to call your healthcare provider  Call the healthcare provider right away if:    You have a fever of 100.4 F (38 C) or higher, or as directed by your healthcare provider.    You have questions about removing the catheter.    The catheter doesn t come out with gentle pulling.    You can t urinate within 8 hours after removing the catheter.    Your belly (abdomen) is painful or bloated.    You have burning pain with urination that lasts for 24 hours.    You see a lot of blood in the urine. Light bleeding for 24 hours is normal.    It feels like the bladder is not emptying.   Date Last Reviewed: 12/1/2016 2000-2017 [employer]. [address] [place] [address], [place] [address]. All rights reserved. This information is not intended as a substitute for professional medical care. Always follow your healthcare professional's instructions.

## 2018-09-13 NOTE — ANESTHESIA PREPROCEDURE EVALUATION
Anesthesia Evaluation     . Pt has had prior anesthetic. Type: General    No history of anesthetic complications          ROS/MED HX    ENT/Pulmonary:       Neurologic: Comment: Meniere's Dz.      Cardiovascular: Comment: Pt experienced SOB prior to cardiac W/U 12/2017.  Since two stents placed no SOB, no chest pain.    (+) --CAD, --stent,12/2017  2 . Taking blood thinners Pt has received instructions: Instructions Given to patient: Continue Plavix, do not stop for surgery. . . :. . Previous cardiac testing date:results:date: results:ECG reviewed date:8/21/2018 results:NSR date: results:         (-) angina, past MI, angina and past MI   METS/Exercise Tolerance:     Hematologic:  - neg hematologic  ROS       Musculoskeletal:         GI/Hepatic:  - neg GI/hepatic ROS       Renal/Genitourinary:     (+) chronic renal disease, Nephrolithiasis ,       Endo:  - neg endo ROS       Psychiatric:     (+) psychiatric history anxiety and depression      Infectious Disease:  - neg infectious disease ROS       Malignancy:      - no malignancy   Other:    - neg other ROS                 Physical Exam  Normal systems: cardiovascular and pulmonary    Airway   Mallampati: II  TM distance: >3 FB  Neck ROM: full    Dental   (+) missing    Cardiovascular   Rhythm and rate: regular and normal      Pulmonary    breath sounds clear to auscultation    Other findings: No loose teeth.                    Anesthesia Plan      History & Physical Review  History and physical reviewed and following examination; no interval change.    ASA Status:  3 .    NPO Status:  > 8 hours and > 2 hours    Plan for General and LMA with Intravenous and Propofol induction. Maintenance will be Balanced.    PONV prophylaxis:  Ondansetron (or other 5HT-3)       Postoperative Care  Postoperative pain management:  IV analgesics and Oral pain medications.      Consents  Anesthetic plan, risks, benefits and alternatives discussed with:  Patient.  Use of blood products  discussed: Yes.   .                          .

## 2018-09-13 NOTE — OR NURSING
Reviewed how to empty huynh. Patient chose to leave huynh bag on. Reviewed with patient and his wife how to take saline out of the balloon to remove huynh tomorrow. Urine remains dark pink with no clots. Pain better after taking pain med. Steady when up. Wife given meds.

## 2018-09-13 NOTE — OR NURSING
Clarified with Dr. Forman (via OR nurse due to MD in surgery) okay to removal huynh tomorrow. Will educate patient on huynh removal for tomorrow 9/14/18.

## 2018-09-13 NOTE — ANESTHESIA POSTPROCEDURE EVALUATION
Patient: Nash De Leon    Procedure(s):  Cystoscopy, Left Ureteroscopy with Stone Basketing, Left Stent Exchange  - Wound Class: II-Clean Contaminated    Diagnosis:Ureteral Stone, Calculus Of Kidney   Diagnosis Additional Information: No value filed.    Anesthesia Type:  General, LMA    Note:  Anesthesia Post Evaluation    Patient location during evaluation: PACU and Bedside  Patient participation: Able to fully participate in evaluation  Level of consciousness: awake and alert  Pain management: adequate  Airway patency: patent  Cardiovascular status: acceptable  Respiratory status: acceptable  Hydration status: balanced  PONV: none     Anesthetic complications: None          Last vitals:  Vitals:    09/13/18 1530 09/13/18 1545 09/13/18 1547   BP: 126/76 128/78    Resp: 11 25    Temp:   (P) 36.3  C (97.3  F)   SpO2: 96% 95%          Electronically Signed By: Shanice Ortega MD  September 13, 2018  3:48 PM

## 2018-09-14 DIAGNOSIS — N20.0 CALCULUS OF KIDNEY: Primary | ICD-10-CM

## 2018-09-14 NOTE — OP NOTE
OPERATIVE REPORT    PREOPERATIVE DIAGNOSIS: Left renal stone     POSTOPERATIVE DIAGNOSIS:  Same    PROCEDURES PERFORMED:   1. Cystoscopy  2. Left ureteroscopy with stone basket extraction  3. Left retrograde pyelogram with interpretation of intraoperative images  4. Left ureteral stent exchange    STAFF SURGEON: Terrell Forman, present and participatory for the entire case.   RESIDENT(S): Cristhian Saleh MD  ANESTHESIA: General    ESTIMATED BLOOD LOSS: <5 cc    DRAINS/TUBES:   7 x 26 double J left ureteral stent    IV FLUIDS: Please see dictated anesthesia record    COMPLICATIONS: None.     SPECIMEN:   Left renal stone for analysis    SIGNIFICANT FINDINGS: Widely patent ureter with numerous stones.  Stone debris and gravel in lower poles.   Maximum Stone Size 4-5 mm   Retrograde Pyelogram Hydronephrotic left kidney    CASE COMPLEXITIES: Plavix    BRIEF OPERATIVE INDICATIONS:  Nash De Leon is a(n) 67 year old male who presented with large left renal stone.  This was treated with first stage ureteroscopy over PCNL due to obligate need for anticoagulation.  He returns today for secondary left ureteroscopy for untreated stone. After a discussion of all risks, benefits, and alternatives, the patient elected to proceed with definitive stone management with a ureteroscopic approach.    After induction of general anesthesia the patient was repositioned in dorsal lithotomy and prepped and draped in the standard sterile fashion.  A time out was performed confirming the appropriate patient identity and planned procedure.  The patient received culture directed antibiotics and had bilateral sequential compression devices for DVT propylaxis.    A 21-Nigerien rigid cystoscope was inserted into a well-lubricated urethra. The urethra was unremarkable without stricture. The previous indwelling ureteral stent was identified and removed with the flexible stent grasper to the level of the urethral meatus. A sensor wire was advanced  up to the renal pelvis under fluoroscopic guidance and previous stent discarded.     A flexible ureteroscope was passed adjacent to the wire.  A steinstrasse was noted in the mid ureter and a 1.5F zero tip nitonol stone basket was used to extract 20-30 pieces from the ureter.  The ureter was cleared and inspected carefully.  It was widely patent and uninjured.  We next passed the scope into the left kidney and performed meticulous nephroscopy.  There was a large collection of gravel in the lower pole.  We basket extracted 20 or so 2-4 mm pieces until all remaining stone fragments were sand and 1-2 mm or less.  We next performed retrograde pyelography to ensure all calyces were seen.  There were no residual stones seen on fluoroscopy or endoscopy.  We peformed pullback ureteroscopy confirming the ureter to be patent and uninjured.  There were no residual ureteral stones.      We replaced a 7 x 26 double J stent into the left ureter which we confirmed had a full proximal and distal curl.  We left a string which we tied to a catheter that we placed in the bladder as he previously experienced post operative urinary retention.  We inflated the balloon in the catheter with 10 ml of water.    The patient tolerated the procedure well and there were no apparent complications. The patient  was transported to the postanesthesia care unit in stable condition.     POSTOP PLAN:  -Discharge home pending, diet pain control no infection symptoms    Terrell Forman

## 2018-09-17 LAB
APPEARANCE STONE: NORMAL
COMPN STONE: NORMAL
NUMBER STONE: 1
SIZE STONE: NORMAL MM
WT STONE: 63 MG

## 2018-11-19 ENCOUNTER — PRE VISIT (OUTPATIENT)
Dept: UROLOGY | Facility: CLINIC | Age: 68
End: 2018-11-19

## 2018-11-20 ENCOUNTER — OFFICE VISIT (OUTPATIENT)
Dept: UROLOGY | Facility: CLINIC | Age: 68
End: 2018-11-20
Payer: COMMERCIAL

## 2018-11-20 ENCOUNTER — RADIANT APPOINTMENT (OUTPATIENT)
Dept: CT IMAGING | Facility: CLINIC | Age: 68
End: 2018-11-20
Attending: UROLOGY
Payer: COMMERCIAL

## 2018-11-20 VITALS
SYSTOLIC BLOOD PRESSURE: 115 MMHG | DIASTOLIC BLOOD PRESSURE: 62 MMHG | HEIGHT: 64 IN | HEART RATE: 58 BPM | WEIGHT: 157.4 LBS | BODY MASS INDEX: 26.87 KG/M2

## 2018-11-20 DIAGNOSIS — N20.0 CALCULUS OF KIDNEY: Primary | ICD-10-CM

## 2018-11-20 DIAGNOSIS — N20.0 CALCULUS OF KIDNEY: ICD-10-CM

## 2018-11-20 ASSESSMENT — PAIN SCALES - GENERAL: PAINLEVEL: NO PAIN (0)

## 2018-11-20 NOTE — LETTER
"11/20/2018       RE: Nash De Leon  [address]  [facility] [address]     Dear Colleague,    Thank you for referring your patient, Nash De Leon, to the [facility] UROLOGY AND INST FOR PROSTATE AND UROLOGIC CANCERS at [facility]. Please see a copy of my visit note below.           UROLOGY FOLLOW-UP NOTE          Chief Complaint:   Today I had the pleasure of seeing Mr. Nash De Leon in follow-up for a chief complaint of left renal stone         Interval Update    Nash De Leon is a very pleasant 68 year old male     Brief  History: Underwent staged L urs for large renal stone, takes plavix for history of recent cardiac stent so PCNL was deferred.  Since last visit 6 weeks ago, stent has been removed, feels well, denies flank pain, hematuria, dysuria, UTI.    Stone analysis- 90% CaOx 10% CaP         Physical Exam:   Patient is a 68 year old  male   Vitals: Blood pressure 115/62, pulse 58, height 1.626 m (5' 4\"), weight 71.4 kg (157 lb 6.4 oz).  Notable Findings on Exam   Awake, alert, no distress  abd soft, nontender  No CVAT bilaterally        Labs and Pathology:    I personally reviewed all applicable laboratory data and went over findings with patient  Significant for:     BMP RESULTS:  Recent Labs   Lab Test 05/19/18 12/27/17 12/18/17   POTASSIUM  4.1   --   4.5   GLC  117*   --   158*   CR  1.17  0.93  0.97   GFRESTIMATED  >60  >60  >60           Imaging:    I personally reviewed all applicable imaging and went over the below findings with patient.    Results for orders placed or performed in visit on 11/20/18   CT Abdomen pelvis w/o contrast    Narrative    EXAMINATION: CT ABDOMEN PELVIS W/O CONTRAST, 11/20/2018 1:17 PM    TECHNIQUE:  Helical CT images from the lung bases through the  symphysis pubis were obtained without IV contrast.    COMPARISON: 5/19/2018.    HISTORY: Calculus of kidney    FINDINGS:    Abdomen and pelvis: Suboptimal evaluation due " to lack of intravenous  contrast injection.    Urinary tract: No right renal stones. 3 mm nonobstructive stone in the  inferior pole of the left kidney. No bilateral hydronephrosis. No  ureteral stones. Partially distended urinary bladder, unremarkable.  The prostate is not enlarged. Symmetric seminal vesicles. Prominent  fat in the left inguinal canal.    Remaining of the abdomen and pelvis: Cholelithiasis. Partial fatty  replacement of the pancreatic parenchyma. Liver, spleen and adrenal  glands are unremarkable images noncontrast study. Sliding hiatal  hernia. No dilated loops of bowel. No bowel wall thickening. The  appendix is not dilated field with hyperattenuating material. Colonic  diverticulosis. No associated CT findings of acute diverticulitis.  Tiny fat-containing umbilical hernia. No abdominal aortic aneurysm.  Atherosclerotic calcifications of the abdominal aorta and its major  branches. No free fluid. No free air. No inguinal lymphadenopathy. No  worrisome pelvic lymph nodes. Subcentimeter retroperitoneal and  mesenteric lymph nodes, not enlarged by size criteria.    Lung bases: No pleural effusions. Bilateral lower lobes dependent  atelectasis.    Bones: Degenerative changes of the spine. Degenerative changes of  bilateral SI joints and hips. Enthesopathic changes off the pelvis. No  aggressive bone lesions.      Impression    IMPRESSION:  1. 3 mm nonobstructive stone in the left kidney. Previously seen  proximal left ureter stone is not identified, likely passed.  2. No right renal stones. No bilateral ureteral stones or  hydronephrosis. Unremarkable urinary bladder.  3. Cholelithiasis.  4. Colonic diverticulosis.  5. Sliding hiatal hernia.    DAVY HAYS MD              Assessment/Plan   68 year old male   -DOing well s/p L URS  -3 mm residual vs. dbris from 2+ cm starting stone, discussed nature of nonobstructing residual fragments  -Will work on stone prevention with 24 hour urine x 2,  prefers telephone follow-up                 Past Medical History:     Past Medical History:   Diagnosis Date     Antiplatelet or antithrombotic long-term use      History of BPH      Meniere's disease      Presence of stent in coronary artery in patient with coronary artery disease      Stented coronary artery             Past Surgical History:     Past Surgical History:   Procedure Laterality Date     CARDIAC SURGERY      Stents     COLONOSCOPY       COMBINED CYSTOSCOPY, RETROGRADES, URETEROSCOPY, LASER HOLMIUM LITHOTRIPSY URETER(S), INSERT STENT Left 8/23/2018    Procedure: COMBINED CYSTOSCOPY, RETROGRADES, URETEROSCOPY, LASER HOLMIUM LITHOTRIPSY URETER(S), INSERT STENT;  Cystoscopy, Left Ueteroscopy with Holmium Laser Lithotripsy, Left Stent Placement;  Surgeon: Terrell Forman MD;  Location: [facility] OR     COMBINED CYSTOSCOPY, RETROGRADES, URETEROSCOPY, LASER HOLMIUM LITHOTRIPSY URETER(S), INSERT STENT Left 9/13/2018    Procedure: COMBINED CYSTOSCOPY, RETROGRADES, URETEROSCOPY, LASER HOLMIUM LITHOTRIPSY URETER(S), INSERT STENT;  Cystoscopy, Left Ureteroscopy with Stone Basketing, Left Stent Exchange ;  Surgeon: Terrell Forman MD;  Location: [facility] OR            Medications     Current Outpatient Prescriptions   Medication     aspirin 81 MG tablet     atorvastatin (LIPITOR) 80 MG tablet     clopidogrel (PLAVIX) 75 MG tablet     metoprolol succinate (TOPROL-XL) 25 MG 24 hr tablet     nitroGLYcerin (NITROSTAT) 0.4 MG sublingual tablet     oxybutynin (DITROPAN XL) 5 MG 24 hr tablet     oxyCODONE IR (ROXICODONE) 5 MG tablet     No current facility-administered medications for this visit.             Family History:   No family history on file.         Social History:     Social History     Social History     Marital status:      Spouse name: N/A     Number of children: N/A     Years of education: N/A     Occupational History     Not on file.     Social History Main Topics     Smoking status: Former Smoker      Quit date: 7/24/1998     Smokeless tobacco: Never Used     Alcohol use No     Drug use: No     Sexual activity: Not on file     Other Topics Concern     Not on file     Social History Narrative            Allergies:   Review of patient's allergies indicates no known allergies.         Review of Systems:  From intake questionnaire   Negative 14 system review except as noted on HPI, nurse's note.        I, Terrell Forman saw and evaluated this patient and agree with the plan as stated above.  I personally performed all listed procedures.    CC:  Ai Simon      >15 minutes were spent face to face with patient over half of which was spent providing medical counseling regarding stone prevention

## 2018-11-20 NOTE — MR AVS SNAPSHOT
After Visit Summary   11/20/2018    Nash De Leon    MRN: [id number]           Patient Information     Date Of Birth          1950        Visit Information        Provider Department      11/20/2018 2:20 PM Terrell Forman MD [facility] Urology and [facility] for Prostate and Urologic C[facility]        Today's Diagnoses     Calculus of kidney    -  1      Care Instructions    Complete the 24 hour urine testing.    Dr. Hahn will notify you of the results and what your follow up will be.    It was a pleasure meeting with you today.  Thank you for allowing me and my team the privilege of caring for you today.  YOU are the reason we are here, and I truly hope we provided you with the excellent service you deserve.  Please let us know if there is anything else we can do for you so that we can be sure you are leaving completely satisfied with your care experience.        SONIA Bobby          Follow-ups after your visit        Who to contact     Please call your clinic at [phone] to:    Ask questions about your health    Make or cancel appointments    Discuss your medicines    Learn about your test results    Speak to your doctor            Additional Information About Your Visit        [employer] Information     [employer] gives you secure access to your electronic health record. If you see a primary care provider, you can also send messages to your care team and make appointments. If you have questions, please call your primary care clinic.  If you do not have a primary care provider, please call [phone] and they will assist you.      [employer] is an electronic gateway that provides easy, online access to your medical records. With [employer], you can request a clinic appointment, read your test results, renew a prescription or communicate with your care team.     To access your existing account, please contact your [facility] or call [phone] for  "assistance.        Care EveryWhere ID     This is your Care EveryWhere ID. This could be used by other organizations to access your [facility] medical records  [id number]        Your Vitals Were     Pulse Height BMI (Body Mass Index)             58 1.626 m (5' 4\") 27.02 kg/m2          Blood Pressure from Last 3 Encounters:   11/20/18 115/62   09/13/18 133/70   08/23/18 125/68    Weight from Last 3 Encounters:   11/20/18 71.4 kg (157 lb 6.4 oz)   09/13/18 71.9 kg (158 lb 8.2 oz)   08/23/18 71 kg (156 lb 8.4 oz)              We Performed the Following     [employer]: Clinic Lab Order          Today's Medication Changes          These changes are accurate as of 11/20/18  2:23 PM.  If you have any questions, ask your nurse or doctor.               Stop taking these medicines if you haven't already. Please contact your care team if you have questions.     ciprofloxacin 500 MG tablet   Commonly known as:  CIPRO   Stopped by:  Terrell Forman MD           tamsulosin 0.4 MG capsule   Commonly known as:  FLOMAX   Stopped by:  Terrell Forman MD                    Primary Care Provider Office Phone # Fax #    Ai MANCERA Alfred,  [phone]       [place] [address]        Equal Access to Services     [facility] Hospital[facility] : Hadii aad ku hadasho Soomaali, waaxda luqadaha, qaybta kaalmada adeegyada, colleen mccabe. So [facility] [phone].    ATENCIÓN: Si habla español, tiene a lemus disposición servicios gratuitos de asistencia lingüística. Khari al [phone].    We comply with applicable federal civil rights laws and [place] laws. We do not discriminate on the basis of race, color, national origin, age, disability, sex, sexual orientation, or gender identity.            Thank you!     Thank you for choosing [facility] UROLOGY AND [facility] FOR PROSTATE AND UROLOGIC CANCERS  for your care. Our goal is always to provide you with excellent care. " Hearing back from our patients is one way we can continue to improve our services. Please take a few minutes to complete the written survey that you may receive in the mail after your visit with us. Thank you!             Your Updated Medication List - Protect others around you: Learn how to safely use, store and throw away your medicines at www.disposemymeds.org.          This list is accurate as of 11/20/18  2:23 PM.  Always use your most recent med list.                   Brand Name Dispense Instructions for use Diagnosis    aspirin 81 MG tablet      Take 81 mg by mouth daily        atorvastatin 80 MG tablet    LIPITOR     Take 80 mg by mouth daily        clopidogrel 75 MG tablet    PLAVIX     Take 75 mg by mouth daily        metoprolol succinate 25 MG 24 hr tablet    TOPROL-XL     TAKE 1/2 TABLET ONCE DAILY - DO NOT CRUSH OR CHEW        nitroGLYcerin 0.4 MG sublingual tablet    NITROSTAT     Place 0.4 mg under the tongue every 5 minutes as needed        oxybutynin 5 MG 24 hr tablet    DITROPAN XL    30 tablet    Take 1 tablet (5 mg) by mouth daily    Ureteral stone       oxyCODONE IR 5 MG tablet    ROXICODONE    10 tablet    Take 1 tablet (5 mg) by mouth every 6 hours as needed for severe pain    Ureteral stone

## 2018-11-20 NOTE — NURSING NOTE
"Chief Complaint   Patient presents with     RECHECK     Kidney stone follow up       Blood pressure 115/62, pulse 58, height 1.626 m (5' 4\"), weight 71.4 kg (157 lb 6.4 oz). Body mass index is 27.02 kg/(m^2).    There is no problem list on file for this patient.      No Known Allergies    Current Outpatient Prescriptions   Medication Sig Dispense Refill     aspirin 81 MG tablet Take 81 mg by mouth daily        atorvastatin (LIPITOR) 80 MG tablet Take 80 mg by mouth daily        clopidogrel (PLAVIX) 75 MG tablet Take 75 mg by mouth daily        metoprolol succinate (TOPROL-XL) 25 MG 24 hr tablet TAKE 1/2 TABLET ONCE DAILY - DO NOT CRUSH OR CHEW       nitroGLYcerin (NITROSTAT) 0.4 MG sublingual tablet Place 0.4 mg under the tongue every 5 minutes as needed        oxybutynin (DITROPAN XL) 5 MG 24 hr tablet Take 1 tablet (5 mg) by mouth daily (Patient not taking: Reported on 11/20/2018) 30 tablet 1     oxyCODONE IR (ROXICODONE) 5 MG tablet Take 1 tablet (5 mg) by mouth every 6 hours as needed for severe pain (Patient not taking: Reported on 11/20/2018) 10 tablet 0       Social History   Substance Use Topics     Smoking status: Former Smoker     Quit date: 7/24/1998     Smokeless tobacco: Never Used     Alcohol use No       SONIA Lowe  11/20/2018  1:40 PM       "

## 2018-11-20 NOTE — PATIENT INSTRUCTIONS
Complete the 24 hour urine testing.    Dr. Hahn will notify you of the results and what your follow up will be.    It was a pleasure meeting with you today.  Thank you for allowing me and my team the privilege of caring for you today.  YOU are the reason we are here, and I truly hope we provided you with the excellent service you deserve.  Please let us know if there is anything else we can do for you so that we can be sure you are leaving completely satisfied with your care experience.        SONIA Bobby

## 2018-11-24 NOTE — PROGRESS NOTES
"       UROLOGY FOLLOW-UP NOTE          Chief Complaint:   Today I had the pleasure of seeing Mr. Nash De Leon in follow-up for a chief complaint of left renal stone         Interval Update    Nash De Leon is a very pleasant 68 year old male     Brief  History: Underwent staged L urs for large renal stone, takes plavix for history of recent cardiac stent so PCNL was deferred.  Since last visit 6 weeks ago, stent has been removed, feels well, denies flank pain, hematuria, dysuria, UTI.    Stone analysis- 90% CaOx 10% CaP         Physical Exam:   Patient is a 68 year old  male   Vitals: Blood pressure 115/62, pulse 58, height 1.626 m (5' 4\"), weight 71.4 kg (157 lb 6.4 oz).  Notable Findings on Exam   Awake, alert, no distress  abd soft, nontender  No CVAT bilaterally        Labs and Pathology:    I personally reviewed all applicable laboratory data and went over findings with patient  Significant for:     BMP RESULTS:  Recent Labs   Lab Test 05/19/18 12/27/17 12/18/17   POTASSIUM  4.1   --   4.5   GLC  117*   --   158*   CR  1.17  0.93  0.97   GFRESTIMATED  >60  >60  >60           Imaging:    I personally reviewed all applicable imaging and went over the below findings with patient.    Results for orders placed or performed in visit on 11/20/18   CT Abdomen pelvis w/o contrast    Narrative    EXAMINATION: CT ABDOMEN PELVIS W/O CONTRAST, 11/20/2018 1:17 PM    TECHNIQUE:  Helical CT images from the lung bases through the  symphysis pubis were obtained without IV contrast.    COMPARISON: 5/19/2018.    HISTORY: Calculus of kidney    FINDINGS:    Abdomen and pelvis: Suboptimal evaluation due to lack of intravenous  contrast injection.    Urinary tract: No right renal stones. 3 mm nonobstructive stone in the  inferior pole of the left kidney. No bilateral hydronephrosis. No  ureteral stones. Partially distended urinary bladder, unremarkable.  The prostate is not enlarged. Symmetric seminal vesicles. Prominent  fat " in the left inguinal canal.    Remaining of the abdomen and pelvis: Cholelithiasis. Partial fatty  replacement of the pancreatic parenchyma. Liver, spleen and adrenal  glands are unremarkable images noncontrast study. Sliding hiatal  hernia. No dilated loops of bowel. No bowel wall thickening. The  appendix is not dilated field with hyperattenuating material. Colonic  diverticulosis. No associated CT findings of acute diverticulitis.  Tiny fat-containing umbilical hernia. No abdominal aortic aneurysm.  Atherosclerotic calcifications of the abdominal aorta and its major  branches. No free fluid. No free air. No inguinal lymphadenopathy. No  worrisome pelvic lymph nodes. Subcentimeter retroperitoneal and  mesenteric lymph nodes, not enlarged by size criteria.    Lung bases: No pleural effusions. Bilateral lower lobes dependent  atelectasis.    Bones: Degenerative changes of the spine. Degenerative changes of  bilateral SI joints and hips. Enthesopathic changes off the pelvis. No  aggressive bone lesions.      Impression    IMPRESSION:  1. 3 mm nonobstructive stone in the left kidney. Previously seen  proximal left ureter stone is not identified, likely passed.  2. No right renal stones. No bilateral ureteral stones or  hydronephrosis. Unremarkable urinary bladder.  3. Cholelithiasis.  4. Colonic diverticulosis.  5. Sliding hiatal hernia.    DAVY HAYS MD              Assessment/Plan   68 year old male   -DOing well s/p L URS  -3 mm residual vs. dbris from 2+ cm starting stone, discussed nature of nonobstructing residual fragments  -Will work on stone prevention with 24 hour urine x 2, prefers telephone follow-up                 Past Medical History:     Past Medical History:   Diagnosis Date     Antiplatelet or antithrombotic long-term use      History of BPH      Meniere's disease      Presence of stent in coronary artery in patient with coronary artery disease      Stented coronary artery             Past  Surgical History:     Past Surgical History:   Procedure Laterality Date     CARDIAC SURGERY      Stents     COLONOSCOPY       COMBINED CYSTOSCOPY, RETROGRADES, URETEROSCOPY, LASER HOLMIUM LITHOTRIPSY URETER(S), INSERT STENT Left 8/23/2018    Procedure: COMBINED CYSTOSCOPY, RETROGRADES, URETEROSCOPY, LASER HOLMIUM LITHOTRIPSY URETER(S), INSERT STENT;  Cystoscopy, Left Ueteroscopy with Holmium Laser Lithotripsy, Left Stent Placement;  Surgeon: Terrell Forman MD;  Location: [facility]     COMBINED CYSTOSCOPY, RETROGRADES, URETEROSCOPY, LASER HOLMIUM LITHOTRIPSY URETER(S), INSERT STENT Left 9/13/2018    Procedure: COMBINED CYSTOSCOPY, RETROGRADES, URETEROSCOPY, LASER HOLMIUM LITHOTRIPSY URETER(S), INSERT STENT;  Cystoscopy, Left Ureteroscopy with Stone Basketing, Left Stent Exchange ;  Surgeon: Terrell Forman MD;  Location: [facility]            Medications     Current Outpatient Prescriptions   Medication     aspirin 81 MG tablet     atorvastatin (LIPITOR) 80 MG tablet     clopidogrel (PLAVIX) 75 MG tablet     metoprolol succinate (TOPROL-XL) 25 MG 24 hr tablet     nitroGLYcerin (NITROSTAT) 0.4 MG sublingual tablet     oxybutynin (DITROPAN XL) 5 MG 24 hr tablet     oxyCODONE IR (ROXICODONE) 5 MG tablet     No current facility-administered medications for this visit.             Family History:   No family history on file.         Social History:     Social History     Social History     Marital status:      Spouse name: N/A     Number of children: N/A     Years of education: N/A     Occupational History     Not on file.     Social History Main Topics     Smoking status: Former Smoker     Quit date: 7/24/1998     Smokeless tobacco: Never Used     Alcohol use No     Drug use: No     Sexual activity: Not on file     Other Topics Concern     Not on file     Social History Narrative            Allergies:   Review of patient's allergies indicates no known allergies.         Review of Systems:  From intake  questionnaire   Negative 14 system review except as noted on HPI, nurse's note.        I, Terrell Forman saw and evaluated this patient and agree with the plan as stated above.  I personally performed all listed procedures.    CC:  Ai Simon      >15 minutes were spent face to face with patient over half of which was spent providing medical counseling regarding stone prevention

## 2020-03-11 ENCOUNTER — HEALTH MAINTENANCE LETTER (OUTPATIENT)
Age: 70
End: 2020-03-11

## 2020-12-09 ENCOUNTER — TRANSFERRED RECORDS (OUTPATIENT)
Dept: HEALTH INFORMATION MANAGEMENT | Facility: HOSPITAL | Age: 70
End: 2020-12-09

## 2020-12-29 LAB
CHOLESTEROL (EXTERNAL): 140 MG/DL (ref 114–200)
HDLC SERPL-MCNC: 54 MG/DL (ref 40–60)
LDL CHOLESTEROL CALCULATED (EXTERNAL): 59 MG/DL
TRIGLYCERIDES (EXTERNAL): 135 MG/DL (ref 10–200)

## 2021-01-03 ENCOUNTER — HEALTH MAINTENANCE LETTER (OUTPATIENT)
Age: 71
End: 2021-01-03

## 2021-04-25 ENCOUNTER — HEALTH MAINTENANCE LETTER (OUTPATIENT)
Age: 71
End: 2021-04-25

## 2021-10-10 ENCOUNTER — HEALTH MAINTENANCE LETTER (OUTPATIENT)
Age: 71
End: 2021-10-10

## 2022-01-07 ENCOUNTER — TRANSFERRED RECORDS (OUTPATIENT)
Dept: HEALTH INFORMATION MANAGEMENT | Facility: HOSPITAL | Age: 72
End: 2022-01-07

## 2022-01-07 LAB
CHOLESTEROL (EXTERNAL): 146 MG/DL (ref 114–200)
HDLC SERPL-MCNC: 52 MG/DL (ref 40–60)
LDL CHOLESTEROL CALCULATED (EXTERNAL): 72 MG/DL
TRIGLYCERIDES (EXTERNAL): 112 MG/DL (ref 10–200)

## 2022-05-21 ENCOUNTER — HEALTH MAINTENANCE LETTER (OUTPATIENT)
Age: 72
End: 2022-05-21

## 2022-06-10 ENCOUNTER — TRANSFERRED RECORDS (OUTPATIENT)
Dept: HEALTH INFORMATION MANAGEMENT | Facility: HOSPITAL | Age: 72
End: 2022-06-10

## 2022-06-10 LAB — HEP C HIM: NORMAL

## 2022-09-18 ENCOUNTER — HEALTH MAINTENANCE LETTER (OUTPATIENT)
Age: 72
End: 2022-09-18

## 2023-01-01 ENCOUNTER — HOSPITAL ENCOUNTER (INPATIENT)
Facility: HOSPITAL | Age: 73
LOS: 4 days | Discharge: HOME | DRG: 356 | End: 2023-07-25
Attending: PHYSICIAN ASSISTANT | Admitting: SURGERY
Payer: COMMERCIAL

## 2023-01-01 ENCOUNTER — ANESTHESIA (OUTPATIENT)
Dept: SURGERY | Facility: HOSPITAL | Age: 73
DRG: 356 | End: 2023-01-01
Payer: COMMERCIAL

## 2023-01-01 ENCOUNTER — HOSPITAL ENCOUNTER (EMERGENCY)
Facility: HOSPITAL | Age: 73
Discharge: HOME | End: 2023-09-15
Attending: STUDENT IN AN ORGANIZED HEALTH CARE EDUCATION/TRAINING PROGRAM | Admitting: STUDENT IN AN ORGANIZED HEALTH CARE EDUCATION/TRAINING PROGRAM
Payer: COMMERCIAL

## 2023-01-01 ENCOUNTER — OFFICE VISIT (OUTPATIENT)
Dept: SURGERY | Facility: OTHER | Age: 73
End: 2023-01-01
Attending: NURSE PRACTITIONER
Payer: COMMERCIAL

## 2023-01-01 ENCOUNTER — APPOINTMENT (OUTPATIENT)
Dept: ULTRASOUND IMAGING | Facility: HOSPITAL | Age: 73
DRG: 330 | End: 2023-01-01
Attending: NURSE ANESTHETIST, CERTIFIED REGISTERED
Payer: COMMERCIAL

## 2023-01-01 ENCOUNTER — OFFICE VISIT (OUTPATIENT)
Dept: INTERNAL MEDICINE | Facility: OTHER | Age: 73
End: 2023-01-01
Attending: INTERNAL MEDICINE
Payer: COMMERCIAL

## 2023-01-01 ENCOUNTER — TELEPHONE (OUTPATIENT)
Dept: INTERNAL MEDICINE | Facility: OTHER | Age: 73
End: 2023-01-01

## 2023-01-01 ENCOUNTER — PREP FOR PROCEDURE (OUTPATIENT)
Dept: SURGERY | Facility: OTHER | Age: 73
End: 2023-01-01

## 2023-01-01 ENCOUNTER — ONCOLOGY VISIT (OUTPATIENT)
Dept: ONCOLOGY | Facility: OTHER | Age: 73
End: 2023-01-01
Attending: INTERNAL MEDICINE
Payer: COMMERCIAL

## 2023-01-01 ENCOUNTER — TRANSCRIBE ORDERS (OUTPATIENT)
Dept: OTHER | Age: 73
End: 2023-01-01

## 2023-01-01 ENCOUNTER — VIRTUAL VISIT (OUTPATIENT)
Dept: ONCOLOGY | Facility: OTHER | Age: 73
End: 2023-01-01
Attending: NURSE PRACTITIONER
Payer: COMMERCIAL

## 2023-01-01 ENCOUNTER — HOSPITAL ENCOUNTER (OUTPATIENT)
Facility: HOSPITAL | Age: 73
Discharge: HOME | End: 2023-07-13
Attending: SURGERY | Admitting: SURGERY
Payer: COMMERCIAL

## 2023-01-01 ENCOUNTER — HOSPITAL ENCOUNTER (EMERGENCY)
Facility: HOSPITAL | Age: 73
Discharge: HOME | End: 2023-06-25
Attending: PHYSICIAN ASSISTANT | Admitting: PHYSICIAN ASSISTANT
Payer: COMMERCIAL

## 2023-01-01 ENCOUNTER — HOSPITAL ENCOUNTER (OUTPATIENT)
Facility: HOSPITAL | Age: 73
Discharge: HOME | End: 2023-08-10
Attending: SURGERY | Admitting: SURGERY
Payer: COMMERCIAL

## 2023-01-01 ENCOUNTER — APPOINTMENT (OUTPATIENT)
Dept: CT IMAGING | Facility: HOSPITAL | Age: 73
End: 2023-01-01
Attending: STUDENT IN AN ORGANIZED HEALTH CARE EDUCATION/TRAINING PROGRAM
Payer: COMMERCIAL

## 2023-01-01 ENCOUNTER — PATIENT OUTREACH (OUTPATIENT)
Dept: CARE COORDINATION | Facility: OTHER | Age: 73
End: 2023-01-01

## 2023-01-01 ENCOUNTER — PATIENT OUTREACH (OUTPATIENT)
Dept: ONCOLOGY | Facility: OTHER | Age: 73
End: 2023-01-01

## 2023-01-01 ENCOUNTER — HOSPITAL ENCOUNTER (OUTPATIENT)
Dept: CT IMAGING | Facility: HOSPITAL | Age: 73
Discharge: HOME | End: 2023-09-15
Attending: INTERNAL MEDICINE | Admitting: INTERNAL MEDICINE
Payer: COMMERCIAL

## 2023-01-01 ENCOUNTER — PRE VISIT (OUTPATIENT)
Dept: ONCOLOGY | Facility: CLINIC | Age: 73
End: 2023-01-01
Payer: COMMERCIAL

## 2023-01-01 ENCOUNTER — APPOINTMENT (OUTPATIENT)
Dept: ULTRASOUND IMAGING | Facility: HOSPITAL | Age: 73
End: 2023-01-01
Attending: STUDENT IN AN ORGANIZED HEALTH CARE EDUCATION/TRAINING PROGRAM
Payer: COMMERCIAL

## 2023-01-01 ENCOUNTER — HOSPITAL ENCOUNTER (INPATIENT)
Facility: HOSPITAL | Age: 73
LOS: 2 days | Discharge: HOME | DRG: 330 | End: 2023-07-19
Attending: SURGERY | Admitting: SURGERY
Payer: COMMERCIAL

## 2023-01-01 ENCOUNTER — ANESTHESIA EVENT (OUTPATIENT)
Dept: SURGERY | Facility: HOSPITAL | Age: 73
DRG: 356 | End: 2023-01-01
Payer: COMMERCIAL

## 2023-01-01 ENCOUNTER — APPOINTMENT (OUTPATIENT)
Dept: CT IMAGING | Facility: HOSPITAL | Age: 73
End: 2023-01-01
Attending: PHYSICIAN ASSISTANT
Payer: COMMERCIAL

## 2023-01-01 ENCOUNTER — APPOINTMENT (OUTPATIENT)
Dept: GENERAL RADIOLOGY | Facility: HOSPITAL | Age: 73
DRG: 356 | End: 2023-01-01
Attending: SURGERY
Payer: COMMERCIAL

## 2023-01-01 ENCOUNTER — ANESTHESIA (OUTPATIENT)
Dept: SURGERY | Facility: HOSPITAL | Age: 73
End: 2023-01-01
Payer: COMMERCIAL

## 2023-01-01 ENCOUNTER — ANESTHESIA EVENT (OUTPATIENT)
Dept: SURGERY | Facility: HOSPITAL | Age: 73
End: 2023-01-01
Payer: COMMERCIAL

## 2023-01-01 ENCOUNTER — ANESTHESIA EVENT (OUTPATIENT)
Dept: SURGERY | Facility: HOSPITAL | Age: 73
DRG: 330 | End: 2023-01-01
Payer: COMMERCIAL

## 2023-01-01 ENCOUNTER — VIRTUAL VISIT (OUTPATIENT)
Dept: ONCOLOGY | Facility: CLINIC | Age: 73
End: 2023-01-01
Attending: INTERNAL MEDICINE
Payer: COMMERCIAL

## 2023-01-01 ENCOUNTER — HEALTH MAINTENANCE LETTER (OUTPATIENT)
Age: 73
End: 2023-01-01

## 2023-01-01 ENCOUNTER — TELEPHONE (OUTPATIENT)
Dept: EMERGENCY MEDICINE | Facility: HOSPITAL | Age: 73
End: 2023-01-01

## 2023-01-01 ENCOUNTER — APPOINTMENT (OUTPATIENT)
Dept: CT IMAGING | Facility: HOSPITAL | Age: 73
DRG: 356 | End: 2023-01-01
Attending: PHYSICIAN ASSISTANT
Payer: COMMERCIAL

## 2023-01-01 ENCOUNTER — DOCUMENTATION ONLY (OUTPATIENT)
Dept: SURGERY | Facility: OTHER | Age: 73
End: 2023-01-01

## 2023-01-01 ENCOUNTER — APPOINTMENT (OUTPATIENT)
Dept: SURGERY | Facility: OTHER | Age: 73
End: 2023-01-01
Attending: AUDIOLOGIST
Payer: COMMERCIAL

## 2023-01-01 ENCOUNTER — ANESTHESIA (OUTPATIENT)
Dept: SURGERY | Facility: HOSPITAL | Age: 73
DRG: 330 | End: 2023-01-01
Payer: COMMERCIAL

## 2023-01-01 ENCOUNTER — APPOINTMENT (OUTPATIENT)
Dept: GENERAL RADIOLOGY | Facility: HOSPITAL | Age: 73
End: 2023-01-01
Attending: PHYSICIAN ASSISTANT
Payer: COMMERCIAL

## 2023-01-01 VITALS — BODY MASS INDEX: 20.6 KG/M2 | WEIGHT: 120 LBS

## 2023-01-01 VITALS
SYSTOLIC BLOOD PRESSURE: 118 MMHG | WEIGHT: 151 LBS | HEART RATE: 73 BPM | BODY MASS INDEX: 23.65 KG/M2 | DIASTOLIC BLOOD PRESSURE: 64 MMHG | OXYGEN SATURATION: 97 % | TEMPERATURE: 97.9 F | RESPIRATION RATE: 20 BRPM

## 2023-01-01 VITALS
HEART RATE: 69 BPM | HEIGHT: 64 IN | DIASTOLIC BLOOD PRESSURE: 80 MMHG | WEIGHT: 142 LBS | BODY MASS INDEX: 24.24 KG/M2 | RESPIRATION RATE: 14 BRPM | OXYGEN SATURATION: 96 % | TEMPERATURE: 98.3 F | SYSTOLIC BLOOD PRESSURE: 132 MMHG

## 2023-01-01 VITALS
TEMPERATURE: 97.8 F | RESPIRATION RATE: 16 BRPM | OXYGEN SATURATION: 98 % | BODY MASS INDEX: 23.05 KG/M2 | HEIGHT: 64 IN | HEART RATE: 91 BPM | DIASTOLIC BLOOD PRESSURE: 74 MMHG | WEIGHT: 135 LBS | SYSTOLIC BLOOD PRESSURE: 128 MMHG

## 2023-01-01 VITALS
SYSTOLIC BLOOD PRESSURE: 108 MMHG | OXYGEN SATURATION: 98 % | BODY MASS INDEX: 24.75 KG/M2 | HEART RATE: 68 BPM | DIASTOLIC BLOOD PRESSURE: 68 MMHG | HEIGHT: 64 IN | WEIGHT: 145 LBS | RESPIRATION RATE: 16 BRPM

## 2023-01-01 VITALS
TEMPERATURE: 97.8 F | HEIGHT: 67 IN | OXYGEN SATURATION: 98 % | RESPIRATION RATE: 16 BRPM | DIASTOLIC BLOOD PRESSURE: 89 MMHG | BODY MASS INDEX: 23.39 KG/M2 | WEIGHT: 149 LBS | SYSTOLIC BLOOD PRESSURE: 146 MMHG | HEART RATE: 66 BPM

## 2023-01-01 VITALS
DIASTOLIC BLOOD PRESSURE: 54 MMHG | BODY MASS INDEX: 24.24 KG/M2 | TEMPERATURE: 98.6 F | SYSTOLIC BLOOD PRESSURE: 124 MMHG | OXYGEN SATURATION: 93 % | WEIGHT: 142 LBS | HEIGHT: 64 IN | RESPIRATION RATE: 16 BRPM | HEART RATE: 74 BPM

## 2023-01-01 VITALS
HEIGHT: 64 IN | WEIGHT: 139.99 LBS | OXYGEN SATURATION: 93 % | HEART RATE: 82 BPM | BODY MASS INDEX: 23.9 KG/M2 | TEMPERATURE: 98.6 F | SYSTOLIC BLOOD PRESSURE: 162 MMHG | RESPIRATION RATE: 16 BRPM | DIASTOLIC BLOOD PRESSURE: 81 MMHG

## 2023-01-01 VITALS
TEMPERATURE: 98.8 F | HEART RATE: 96 BPM | SYSTOLIC BLOOD PRESSURE: 104 MMHG | OXYGEN SATURATION: 95 % | RESPIRATION RATE: 14 BRPM | BODY MASS INDEX: 22.33 KG/M2 | WEIGHT: 130.07 LBS | DIASTOLIC BLOOD PRESSURE: 62 MMHG

## 2023-01-01 VITALS — HEIGHT: 64 IN | BODY MASS INDEX: 20.83 KG/M2 | WEIGHT: 122 LBS

## 2023-01-01 VITALS
WEIGHT: 120.4 LBS | OXYGEN SATURATION: 95 % | DIASTOLIC BLOOD PRESSURE: 87 MMHG | TEMPERATURE: 98.1 F | SYSTOLIC BLOOD PRESSURE: 126 MMHG | BODY MASS INDEX: 20.67 KG/M2 | RESPIRATION RATE: 16 BRPM | HEART RATE: 68 BPM

## 2023-01-01 DIAGNOSIS — C7A.1 LARGE CELL NEUROENDOCRINE CARCINOMA (H): Primary | ICD-10-CM

## 2023-01-01 DIAGNOSIS — K91.872 POSTOPERATIVE SEROMA INVOLVING DIGESTIVE SYSTEM AFTER DIGESTIVE SYSTEM PROCEDURE: ICD-10-CM

## 2023-01-01 DIAGNOSIS — C18.9 COLON CANCER (H): Primary | ICD-10-CM

## 2023-01-01 DIAGNOSIS — K63.89 COLONIC MASS: Primary | ICD-10-CM

## 2023-01-01 DIAGNOSIS — G89.3 CANCER RELATED PAIN: Primary | ICD-10-CM

## 2023-01-01 DIAGNOSIS — Z86.0100 HISTORY OF COLONIC POLYPS: ICD-10-CM

## 2023-01-01 DIAGNOSIS — C7A.1 LARGE CELL NEUROENDOCRINE CARCINOMA (H): ICD-10-CM

## 2023-01-01 DIAGNOSIS — R10.84 ABDOMINAL PAIN, GENERALIZED: Primary | ICD-10-CM

## 2023-01-01 DIAGNOSIS — C18.9 COLON CANCER METASTASIZED TO LIVER (H): ICD-10-CM

## 2023-01-01 DIAGNOSIS — C78.7 COLON CANCER METASTASIZED TO LIVER (H): ICD-10-CM

## 2023-01-01 DIAGNOSIS — C18.9 COLON CANCER METASTASIZED TO LIVER (H): Primary | ICD-10-CM

## 2023-01-01 DIAGNOSIS — Z90.49 STATUS POST RIGHT HEMICOLECTOMY: Primary | ICD-10-CM

## 2023-01-01 DIAGNOSIS — C78.7 COLON CANCER METASTASIZED TO LIVER (H): Primary | ICD-10-CM

## 2023-01-01 DIAGNOSIS — R63.4 WEIGHT LOSS: ICD-10-CM

## 2023-01-01 DIAGNOSIS — K56.609 COLONIC OBSTRUCTION (H): Primary | ICD-10-CM

## 2023-01-01 DIAGNOSIS — K76.9 LIVER LESION: ICD-10-CM

## 2023-01-01 DIAGNOSIS — Z51.11 ENCOUNTER FOR ANTINEOPLASTIC CHEMOTHERAPY: Primary | ICD-10-CM

## 2023-01-01 DIAGNOSIS — K63.9 COLON WALL THICKENING: ICD-10-CM

## 2023-01-01 DIAGNOSIS — R11.2 NAUSEA AND VOMITING, UNSPECIFIED VOMITING TYPE: ICD-10-CM

## 2023-01-01 DIAGNOSIS — R11.0 NAUSEA: ICD-10-CM

## 2023-01-01 DIAGNOSIS — K63.89 COLONIC MASS: ICD-10-CM

## 2023-01-01 DIAGNOSIS — R10.11 RIGHT UPPER QUADRANT ABDOMINAL PAIN: ICD-10-CM

## 2023-01-01 DIAGNOSIS — K59.00 CONSTIPATION, UNSPECIFIED CONSTIPATION TYPE: ICD-10-CM

## 2023-01-01 DIAGNOSIS — K59.09 OTHER CONSTIPATION: ICD-10-CM

## 2023-01-01 DIAGNOSIS — C18.4 MALIGNANT NEOPLASM OF TRANSVERSE COLON (H): Primary | ICD-10-CM

## 2023-01-01 DIAGNOSIS — I25.10 CORONARY ARTERY DISEASE INVOLVING NATIVE CORONARY ARTERY OF NATIVE HEART WITHOUT ANGINA PECTORIS: Primary | ICD-10-CM

## 2023-01-01 DIAGNOSIS — G89.3 CANCER RELATED PAIN: ICD-10-CM

## 2023-01-01 LAB
ALBUMIN SERPL BCG-MCNC: 2.7 G/DL (ref 3.5–5.2)
ALBUMIN SERPL BCG-MCNC: 3 G/DL (ref 3.5–5.2)
ALBUMIN SERPL BCG-MCNC: 3.1 G/DL (ref 3.5–5.2)
ALBUMIN SERPL BCG-MCNC: 3.2 G/DL (ref 3.5–5.2)
ALBUMIN SERPL BCG-MCNC: 3.2 G/DL (ref 3.5–5.2)
ALBUMIN SERPL BCG-MCNC: 3.9 G/DL (ref 3.5–5.2)
ALBUMIN SERPL BCG-MCNC: 4 G/DL (ref 3.5–5.2)
ALP SERPL-CCNC: 113 U/L (ref 40–129)
ALP SERPL-CCNC: 56 U/L (ref 40–129)
ALP SERPL-CCNC: 563 U/L (ref 40–129)
ALP SERPL-CCNC: 59 U/L (ref 40–129)
ALP SERPL-CCNC: 63 U/L (ref 40–129)
ALP SERPL-CCNC: 74 U/L (ref 40–129)
ALP SERPL-CCNC: 93 U/L (ref 40–129)
ALT SERPL W P-5'-P-CCNC: 13 U/L (ref 0–70)
ALT SERPL W P-5'-P-CCNC: 21 U/L (ref 0–70)
ALT SERPL W P-5'-P-CCNC: 28 U/L (ref 0–70)
ALT SERPL W P-5'-P-CCNC: 29 U/L (ref 0–70)
ALT SERPL W P-5'-P-CCNC: 34 U/L (ref 0–70)
ALT SERPL W P-5'-P-CCNC: 74 U/L (ref 0–70)
ALT SERPL W P-5'-P-CCNC: 9 U/L (ref 0–70)
ANION GAP SERPL CALCULATED.3IONS-SCNC: 11 MMOL/L (ref 7–15)
ANION GAP SERPL CALCULATED.3IONS-SCNC: 11 MMOL/L (ref 7–15)
ANION GAP SERPL CALCULATED.3IONS-SCNC: 12 MMOL/L (ref 7–15)
ANION GAP SERPL CALCULATED.3IONS-SCNC: 12 MMOL/L (ref 7–15)
ANION GAP SERPL CALCULATED.3IONS-SCNC: 13 MMOL/L (ref 7–15)
ANION GAP SERPL CALCULATED.3IONS-SCNC: 14 MMOL/L (ref 7–15)
AST SERPL W P-5'-P-CCNC: 106 U/L (ref 0–45)
AST SERPL W P-5'-P-CCNC: 16 U/L (ref 0–45)
AST SERPL W P-5'-P-CCNC: 18 U/L (ref 0–45)
AST SERPL W P-5'-P-CCNC: 22 U/L (ref 0–45)
AST SERPL W P-5'-P-CCNC: 29 U/L (ref 0–45)
AST SERPL W P-5'-P-CCNC: 40 U/L (ref 0–45)
AST SERPL W P-5'-P-CCNC: 48 U/L (ref 0–45)
BACTERIA BLD CULT: NO GROWTH
BACTERIA BLD CULT: NO GROWTH
BASOPHILS # BLD AUTO: 0.1 10E3/UL (ref 0–0.2)
BASOPHILS NFR BLD AUTO: 0 %
BASOPHILS NFR BLD AUTO: 0 %
BASOPHILS NFR BLD AUTO: 1 %
BILIRUB SERPL-MCNC: 0.4 MG/DL
BILIRUB SERPL-MCNC: 0.4 MG/DL
BILIRUB SERPL-MCNC: 0.5 MG/DL
BILIRUB SERPL-MCNC: 0.6 MG/DL
BILIRUB SERPL-MCNC: 0.7 MG/DL
BILIRUB SERPL-MCNC: 0.7 MG/DL
BILIRUB SERPL-MCNC: 3.3 MG/DL
BUN SERPL-MCNC: 15.1 MG/DL (ref 8–23)
BUN SERPL-MCNC: 15.6 MG/DL (ref 8–23)
BUN SERPL-MCNC: 16 MG/DL (ref 8–23)
BUN SERPL-MCNC: 16.7 MG/DL (ref 8–23)
BUN SERPL-MCNC: 17.3 MG/DL (ref 8–23)
BUN SERPL-MCNC: 21.5 MG/DL (ref 8–23)
BUN SERPL-MCNC: 24.6 MG/DL (ref 8–23)
BUN SERPL-MCNC: 29.8 MG/DL (ref 8–23)
CALCIUM SERPL-MCNC: 8.2 MG/DL (ref 8.8–10.2)
CALCIUM SERPL-MCNC: 8.2 MG/DL (ref 8.8–10.2)
CALCIUM SERPL-MCNC: 8.6 MG/DL (ref 8.8–10.2)
CALCIUM SERPL-MCNC: 8.8 MG/DL (ref 8.8–10.2)
CALCIUM SERPL-MCNC: 9 MG/DL (ref 8.8–10.2)
CALCIUM SERPL-MCNC: 9.5 MG/DL (ref 8.8–10.2)
CALCIUM SERPL-MCNC: 9.6 MG/DL (ref 8.8–10.2)
CALCIUM SERPL-MCNC: 9.9 MG/DL (ref 8.8–10.2)
CEA SERPL-MCNC: <0.6 NG/ML
CHLORIDE SERPL-SCNC: 101 MMOL/L (ref 98–107)
CHLORIDE SERPL-SCNC: 101 MMOL/L (ref 98–107)
CHLORIDE SERPL-SCNC: 102 MMOL/L (ref 98–107)
CHLORIDE SERPL-SCNC: 103 MMOL/L (ref 98–107)
CHLORIDE SERPL-SCNC: 104 MMOL/L (ref 98–107)
CHLORIDE SERPL-SCNC: 91 MMOL/L (ref 98–107)
CHLORIDE SERPL-SCNC: 94 MMOL/L (ref 98–107)
CHLORIDE SERPL-SCNC: 96 MMOL/L (ref 98–107)
CREAT BLD-MCNC: 1.2 MG/DL (ref 0.7–1.3)
CREAT SERPL-MCNC: 0.69 MG/DL (ref 0.67–1.17)
CREAT SERPL-MCNC: 0.78 MG/DL (ref 0.67–1.17)
CREAT SERPL-MCNC: 0.78 MG/DL (ref 0.67–1.17)
CREAT SERPL-MCNC: 0.79 MG/DL (ref 0.67–1.17)
CREAT SERPL-MCNC: 0.9 MG/DL (ref 0.67–1.17)
CREAT SERPL-MCNC: 1.03 MG/DL (ref 0.67–1.17)
CREAT SERPL-MCNC: 1.04 MG/DL (ref 0.67–1.17)
CREAT SERPL-MCNC: 1.06 MG/DL (ref 0.67–1.17)
CRP SERPL-MCNC: 65.84 MG/L
DEPRECATED HCO3 PLAS-SCNC: 19 MMOL/L (ref 22–29)
DEPRECATED HCO3 PLAS-SCNC: 21 MMOL/L (ref 22–29)
DEPRECATED HCO3 PLAS-SCNC: 23 MMOL/L (ref 22–29)
DEPRECATED HCO3 PLAS-SCNC: 23 MMOL/L (ref 22–29)
DEPRECATED HCO3 PLAS-SCNC: 24 MMOL/L (ref 22–29)
DEPRECATED HCO3 PLAS-SCNC: 24 MMOL/L (ref 22–29)
DEPRECATED HCO3 PLAS-SCNC: 25 MMOL/L (ref 22–29)
DEPRECATED HCO3 PLAS-SCNC: 26 MMOL/L (ref 22–29)
EGFRCR SERPLBLD CKD-EPI 2021: 74 ML/MIN/1.73M2
EGFRCR SERPLBLD CKD-EPI 2021: >60 ML/MIN/1.73M2
EOSINOPHIL # BLD AUTO: 0 10E3/UL (ref 0–0.7)
EOSINOPHIL # BLD AUTO: 0.3 10E3/UL (ref 0–0.7)
EOSINOPHIL # BLD AUTO: 0.3 10E3/UL (ref 0–0.7)
EOSINOPHIL NFR BLD AUTO: 0 %
EOSINOPHIL NFR BLD AUTO: 2 %
EOSINOPHIL NFR BLD AUTO: 3 %
ERYTHROCYTE [DISTWIDTH] IN BLOOD BY AUTOMATED COUNT: 12.4 % (ref 10–15)
ERYTHROCYTE [DISTWIDTH] IN BLOOD BY AUTOMATED COUNT: 12.4 % (ref 10–15)
ERYTHROCYTE [DISTWIDTH] IN BLOOD BY AUTOMATED COUNT: 12.5 % (ref 10–15)
ERYTHROCYTE [DISTWIDTH] IN BLOOD BY AUTOMATED COUNT: 12.6 % (ref 10–15)
ERYTHROCYTE [DISTWIDTH] IN BLOOD BY AUTOMATED COUNT: 12.7 % (ref 10–15)
ERYTHROCYTE [DISTWIDTH] IN BLOOD BY AUTOMATED COUNT: 12.7 % (ref 10–15)
ERYTHROCYTE [DISTWIDTH] IN BLOOD BY AUTOMATED COUNT: 13 % (ref 10–15)
ERYTHROCYTE [DISTWIDTH] IN BLOOD BY AUTOMATED COUNT: 16.3 % (ref 10–15)
GFR SERPL CREATININE-BSD FRML MDRD: 75 ML/MIN/1.73M2
GFR SERPL CREATININE-BSD FRML MDRD: 76 ML/MIN/1.73M2
GFR SERPL CREATININE-BSD FRML MDRD: 90 ML/MIN/1.73M2
GFR SERPL CREATININE-BSD FRML MDRD: >90 ML/MIN/1.73M2
GLUCOSE BLDC GLUCOMTR-MCNC: 125 MG/DL (ref 70–99)
GLUCOSE BLDC GLUCOMTR-MCNC: 144 MG/DL (ref 70–99)
GLUCOSE BLDC GLUCOMTR-MCNC: 148 MG/DL (ref 70–99)
GLUCOSE BLDC GLUCOMTR-MCNC: 158 MG/DL (ref 70–99)
GLUCOSE BLDC GLUCOMTR-MCNC: 170 MG/DL (ref 70–99)
GLUCOSE SERPL-MCNC: 106 MG/DL (ref 70–99)
GLUCOSE SERPL-MCNC: 119 MG/DL (ref 70–99)
GLUCOSE SERPL-MCNC: 129 MG/DL (ref 70–99)
GLUCOSE SERPL-MCNC: 137 MG/DL (ref 70–99)
GLUCOSE SERPL-MCNC: 144 MG/DL (ref 70–99)
GLUCOSE SERPL-MCNC: 148 MG/DL (ref 70–99)
GLUCOSE SERPL-MCNC: 163 MG/DL (ref 70–99)
GLUCOSE SERPL-MCNC: 96 MG/DL (ref 70–99)
HCT VFR BLD AUTO: 33.3 % (ref 40–53)
HCT VFR BLD AUTO: 37.9 % (ref 40–53)
HCT VFR BLD AUTO: 39.3 % (ref 40–53)
HCT VFR BLD AUTO: 41 % (ref 40–53)
HCT VFR BLD AUTO: 42.3 % (ref 40–53)
HCT VFR BLD AUTO: 43.1 % (ref 40–53)
HCT VFR BLD AUTO: 47.7 % (ref 40–53)
HCT VFR BLD AUTO: 49.2 % (ref 40–53)
HGB BLD-MCNC: 11.4 G/DL (ref 13.3–17.7)
HGB BLD-MCNC: 13.1 G/DL (ref 13.3–17.7)
HGB BLD-MCNC: 13.5 G/DL (ref 13.3–17.7)
HGB BLD-MCNC: 13.9 G/DL (ref 13.3–17.7)
HGB BLD-MCNC: 14.3 G/DL (ref 13.3–17.7)
HGB BLD-MCNC: 14.6 G/DL (ref 13.3–17.7)
HGB BLD-MCNC: 16.2 G/DL (ref 13.3–17.7)
HGB BLD-MCNC: 16.7 G/DL (ref 13.3–17.7)
HOLD SPECIMEN: NORMAL
IMM GRANULOCYTES # BLD: 0 10E3/UL
IMM GRANULOCYTES # BLD: 0.3 10E3/UL
IMM GRANULOCYTES # BLD: 0.4 10E3/UL
IMM GRANULOCYTES NFR BLD: 0 %
IMM GRANULOCYTES NFR BLD: 2 %
IMM GRANULOCYTES NFR BLD: 2 %
LACTATE SERPL-SCNC: 1.1 MMOL/L (ref 0.7–2)
LACTATE SERPL-SCNC: 1.6 MMOL/L (ref 0.7–2)
LACTATE SERPL-SCNC: 2.8 MMOL/L (ref 0.7–2)
LIPASE SERPL-CCNC: 12 U/L (ref 13–60)
LIPASE SERPL-CCNC: 28 U/L (ref 13–60)
LIPASE SERPL-CCNC: 56 U/L (ref 13–60)
LYMPHOCYTES # BLD AUTO: 1.3 10E3/UL (ref 0.8–5.3)
LYMPHOCYTES # BLD AUTO: 1.6 10E3/UL (ref 0.8–5.3)
LYMPHOCYTES # BLD AUTO: 1.8 10E3/UL (ref 0.8–5.3)
LYMPHOCYTES NFR BLD AUTO: 16 %
LYMPHOCYTES NFR BLD AUTO: 7 %
LYMPHOCYTES NFR BLD AUTO: 8 %
MCH RBC QN AUTO: 29.4 PG (ref 26.5–33)
MCH RBC QN AUTO: 30.1 PG (ref 26.5–33)
MCH RBC QN AUTO: 30.2 PG (ref 26.5–33)
MCH RBC QN AUTO: 30.3 PG (ref 26.5–33)
MCH RBC QN AUTO: 30.4 PG (ref 26.5–33)
MCH RBC QN AUTO: 30.5 PG (ref 26.5–33)
MCHC RBC AUTO-ENTMCNC: 33.8 G/DL (ref 31.5–36.5)
MCHC RBC AUTO-ENTMCNC: 33.9 G/DL (ref 31.5–36.5)
MCHC RBC AUTO-ENTMCNC: 34 G/DL (ref 31.5–36.5)
MCHC RBC AUTO-ENTMCNC: 34.2 G/DL (ref 31.5–36.5)
MCHC RBC AUTO-ENTMCNC: 34.4 G/DL (ref 31.5–36.5)
MCHC RBC AUTO-ENTMCNC: 34.6 G/DL (ref 31.5–36.5)
MCV RBC AUTO: 87 FL (ref 78–100)
MCV RBC AUTO: 88 FL (ref 78–100)
MCV RBC AUTO: 88 FL (ref 78–100)
MCV RBC AUTO: 89 FL (ref 78–100)
MCV RBC AUTO: 90 FL (ref 78–100)
MCV RBC AUTO: 90 FL (ref 78–100)
MONOCYTES # BLD AUTO: 1.6 10E3/UL (ref 0–1.3)
MONOCYTES # BLD AUTO: 1.9 10E3/UL (ref 0–1.3)
MONOCYTES # BLD AUTO: 3.6 10E3/UL (ref 0–1.3)
MONOCYTES NFR BLD AUTO: 11 %
MONOCYTES NFR BLD AUTO: 14 %
MONOCYTES NFR BLD AUTO: 16 %
NEUTROPHILS # BLD AUTO: 13.4 10E3/UL (ref 1.6–8.3)
NEUTROPHILS # BLD AUTO: 16.9 10E3/UL (ref 1.6–8.3)
NEUTROPHILS # BLD AUTO: 7.3 10E3/UL (ref 1.6–8.3)
NEUTROPHILS NFR BLD AUTO: 66 %
NEUTROPHILS NFR BLD AUTO: 75 %
NEUTROPHILS NFR BLD AUTO: 77 %
NRBC # BLD AUTO: 0 10E3/UL
NRBC BLD AUTO-RTO: 0 /100
PATH REPORT.COMMENTS IMP SPEC: ABNORMAL
PATH REPORT.COMMENTS IMP SPEC: YES
PATH REPORT.COMMENTS IMP SPEC: YES
PATH REPORT.FINAL DX SPEC: ABNORMAL
PATH REPORT.FINAL DX SPEC: ABNORMAL
PATH REPORT.GROSS SPEC: ABNORMAL
PATH REPORT.GROSS SPEC: ABNORMAL
PATH REPORT.INTRAOP OBS SPEC DOC: ABNORMAL
PATH REPORT.MICROSCOPIC SPEC OTHER STN: ABNORMAL
PATH REPORT.MICROSCOPIC SPEC OTHER STN: ABNORMAL
PATH REPORT.RELEVANT HX SPEC: ABNORMAL
PATH REPORT.RELEVANT HX SPEC: ABNORMAL
PATHOLOGY SYNOPTIC REPORT: ABNORMAL
PHOTO IMAGE: ABNORMAL
PHOTO IMAGE: ABNORMAL
PLATELET # BLD AUTO: 168 10E3/UL (ref 150–450)
PLATELET # BLD AUTO: 170 10E3/UL (ref 150–450)
PLATELET # BLD AUTO: 171 10E3/UL (ref 150–450)
PLATELET # BLD AUTO: 173 10E3/UL (ref 150–450)
PLATELET # BLD AUTO: 201 10E3/UL (ref 150–450)
PLATELET # BLD AUTO: 216 10E3/UL (ref 150–450)
PLATELET # BLD AUTO: 237 10E3/UL (ref 150–450)
PLATELET # BLD AUTO: 312 10E3/UL (ref 150–450)
POTASSIUM SERPL-SCNC: 3.7 MMOL/L (ref 3.4–5.3)
POTASSIUM SERPL-SCNC: 4 MMOL/L (ref 3.4–5.3)
POTASSIUM SERPL-SCNC: 4.1 MMOL/L (ref 3.4–5.3)
POTASSIUM SERPL-SCNC: 4.2 MMOL/L (ref 3.4–5.3)
POTASSIUM SERPL-SCNC: 4.7 MMOL/L (ref 3.4–5.3)
POTASSIUM SERPL-SCNC: 4.8 MMOL/L (ref 3.4–5.3)
PROCALCITONIN SERPL IA-MCNC: 1.41 NG/ML
PROT SERPL-MCNC: 4.9 G/DL (ref 6.4–8.3)
PROT SERPL-MCNC: 5.3 G/DL (ref 6.4–8.3)
PROT SERPL-MCNC: 5.5 G/DL (ref 6.4–8.3)
PROT SERPL-MCNC: 5.6 G/DL (ref 6.4–8.3)
PROT SERPL-MCNC: 6.8 G/DL (ref 6.4–8.3)
PROT SERPL-MCNC: 6.8 G/DL (ref 6.4–8.3)
PROT SERPL-MCNC: 7.1 G/DL (ref 6.4–8.3)
RBC # BLD AUTO: 3.75 10E6/UL (ref 4.4–5.9)
RBC # BLD AUTO: 4.29 10E6/UL (ref 4.4–5.9)
RBC # BLD AUTO: 4.45 10E6/UL (ref 4.4–5.9)
RBC # BLD AUTO: 4.56 10E6/UL (ref 4.4–5.9)
RBC # BLD AUTO: 4.73 10E6/UL (ref 4.4–5.9)
RBC # BLD AUTO: 4.97 10E6/UL (ref 4.4–5.9)
RBC # BLD AUTO: 5.38 10E6/UL (ref 4.4–5.9)
RBC # BLD AUTO: 5.47 10E6/UL (ref 4.4–5.9)
SODIUM SERPL-SCNC: 129 MMOL/L (ref 136–145)
SODIUM SERPL-SCNC: 133 MMOL/L (ref 136–145)
SODIUM SERPL-SCNC: 134 MMOL/L (ref 136–145)
SODIUM SERPL-SCNC: 135 MMOL/L (ref 136–145)
SODIUM SERPL-SCNC: 136 MMOL/L (ref 136–145)
SODIUM SERPL-SCNC: 136 MMOL/L (ref 136–145)
SODIUM SERPL-SCNC: 137 MMOL/L (ref 136–145)
SODIUM SERPL-SCNC: 138 MMOL/L (ref 136–145)
WBC # BLD AUTO: 11.1 10E3/UL (ref 4–11)
WBC # BLD AUTO: 16.6 10E3/UL (ref 4–11)
WBC # BLD AUTO: 17.3 10E3/UL (ref 4–11)
WBC # BLD AUTO: 17.3 10E3/UL (ref 4–11)
WBC # BLD AUTO: 18.9 10E3/UL (ref 4–11)
WBC # BLD AUTO: 22.5 10E3/UL (ref 4–11)
WBC # BLD AUTO: 24 10E3/UL (ref 4–11)
WBC # BLD AUTO: 24.2 10E3/UL (ref 4–11)

## 2023-01-01 PROCEDURE — 36415 COLL VENOUS BLD VENIPUNCTURE: CPT | Performed by: NURSE PRACTITIONER

## 2023-01-01 PROCEDURE — 85004 AUTOMATED DIFF WBC COUNT: CPT | Performed by: PHYSICIAN ASSISTANT

## 2023-01-01 PROCEDURE — 74019 RADEX ABDOMEN 2 VIEWS: CPT

## 2023-01-01 PROCEDURE — 99285 EMERGENCY DEPT VISIT HI MDM: CPT | Performed by: PHYSICIAN ASSISTANT

## 2023-01-01 PROCEDURE — 99285 EMERGENCY DEPT VISIT HI MDM: CPT | Mod: 25

## 2023-01-01 PROCEDURE — 76705 ECHO EXAM OF ABDOMEN: CPT

## 2023-01-01 PROCEDURE — 999N000141 HC STATISTIC PRE-PROCEDURE NURSING ASSESSMENT: Performed by: SURGERY

## 2023-01-01 PROCEDURE — 36415 COLL VENOUS BLD VENIPUNCTURE: CPT | Performed by: SURGERY

## 2023-01-01 PROCEDURE — 88305 TISSUE EXAM BY PATHOLOGIST: CPT | Mod: 26 | Performed by: PATHOLOGY

## 2023-01-01 PROCEDURE — 258N000003 HC RX IP 258 OP 636: Performed by: PHYSICIAN ASSISTANT

## 2023-01-01 PROCEDURE — 99223 1ST HOSP IP/OBS HIGH 75: CPT | Mod: AI | Performed by: HOSPITALIST

## 2023-01-01 PROCEDURE — 83605 ASSAY OF LACTIC ACID: CPT | Performed by: PHYSICIAN ASSISTANT

## 2023-01-01 PROCEDURE — 99222 1ST HOSP IP/OBS MODERATE 55: CPT | Mod: 24 | Performed by: SURGERY

## 2023-01-01 PROCEDURE — 88309 TISSUE EXAM BY PATHOLOGIST: CPT | Mod: 26 | Performed by: PATHOLOGY

## 2023-01-01 PROCEDURE — 74177 CT ABD & PELVIS W/CONTRAST: CPT

## 2023-01-01 PROCEDURE — 36415 COLL VENOUS BLD VENIPUNCTURE: CPT | Performed by: PHYSICIAN ASSISTANT

## 2023-01-01 PROCEDURE — 258N000003 HC RX IP 258 OP 636: Performed by: EMERGENCY MEDICINE

## 2023-01-01 PROCEDURE — 250N000013 HC RX MED GY IP 250 OP 250 PS 637: Performed by: SURGERY

## 2023-01-01 PROCEDURE — 88360 TUMOR IMMUNOHISTOCHEM/MANUAL: CPT | Mod: 26 | Performed by: PATHOLOGY

## 2023-01-01 PROCEDURE — 85027 COMPLETE CBC AUTOMATED: CPT | Performed by: SURGERY

## 2023-01-01 PROCEDURE — 80053 COMPREHEN METABOLIC PANEL: CPT | Performed by: SURGERY

## 2023-01-01 PROCEDURE — 250N000011 HC RX IP 250 OP 636: Performed by: SURGERY

## 2023-01-01 PROCEDURE — 82378 CARCINOEMBRYONIC ANTIGEN: CPT | Performed by: SURGERY

## 2023-01-01 PROCEDURE — 70450 CT HEAD/BRAIN W/O DYE: CPT

## 2023-01-01 PROCEDURE — 99232 SBSQ HOSP IP/OBS MODERATE 35: CPT | Mod: 24 | Performed by: HOSPITALIST

## 2023-01-01 PROCEDURE — 250N000011 HC RX IP 250 OP 636: Mod: JZ | Performed by: PHYSICIAN ASSISTANT

## 2023-01-01 PROCEDURE — 85025 COMPLETE CBC W/AUTO DIFF WBC: CPT | Performed by: NURSE PRACTITIONER

## 2023-01-01 PROCEDURE — 80048 BASIC METABOLIC PNL TOTAL CA: CPT | Performed by: SURGERY

## 2023-01-01 PROCEDURE — 88342 IMHCHEM/IMCYTCHM 1ST ANTB: CPT | Mod: 26 | Performed by: PATHOLOGY

## 2023-01-01 PROCEDURE — 250N000009 HC RX 250: Performed by: NURSE ANESTHETIST, CERTIFIED REGISTERED

## 2023-01-01 PROCEDURE — 0FD03ZX EXTRACTION OF LIVER, PERCUTANEOUS APPROACH, DIAGNOSTIC: ICD-10-PCS | Performed by: SURGERY

## 2023-01-01 PROCEDURE — 99417 PROLNG OP E/M EACH 15 MIN: CPT | Performed by: INTERNAL MEDICINE

## 2023-01-01 PROCEDURE — 99205 OFFICE O/P NEW HI 60 MIN: CPT | Performed by: INTERNAL MEDICINE

## 2023-01-01 PROCEDURE — 96375 TX/PRO/DX INJ NEW DRUG ADDON: CPT

## 2023-01-01 PROCEDURE — 258N000003 HC RX IP 258 OP 636: Performed by: SURGERY

## 2023-01-01 PROCEDURE — 88305 TISSUE EXAM BY PATHOLOGIST: CPT | Mod: TC | Performed by: SURGERY

## 2023-01-01 PROCEDURE — 250N000011 HC RX IP 250 OP 636: Performed by: PHYSICIAN ASSISTANT

## 2023-01-01 PROCEDURE — G0463 HOSPITAL OUTPT CLINIC VISIT: HCPCS

## 2023-01-01 PROCEDURE — 88341 IMHCHEM/IMCYTCHM EA ADD ANTB: CPT | Mod: 26 | Performed by: PATHOLOGY

## 2023-01-01 PROCEDURE — 250N000011 HC RX IP 250 OP 636: Performed by: NURSE ANESTHETIST, CERTIFIED REGISTERED

## 2023-01-01 PROCEDURE — 120N000001 HC R&B MED SURG/OB

## 2023-01-01 PROCEDURE — 45380 COLONOSCOPY AND BIOPSY: CPT | Performed by: SURGERY

## 2023-01-01 PROCEDURE — 360N000077 HC SURGERY LEVEL 4, PER MIN: Performed by: SURGERY

## 2023-01-01 PROCEDURE — 96374 THER/PROPH/DIAG INJ IV PUSH: CPT

## 2023-01-01 PROCEDURE — 258N000003 HC RX IP 258 OP 636: Performed by: NURSE ANESTHETIST, CERTIFIED REGISTERED

## 2023-01-01 PROCEDURE — 0DTF0ZZ RESECTION OF RIGHT LARGE INTESTINE, OPEN APPROACH: ICD-10-PCS | Performed by: SURGERY

## 2023-01-01 PROCEDURE — 0WJP0ZZ INSPECTION OF GASTROINTESTINAL TRACT, OPEN APPROACH: ICD-10-PCS | Performed by: SURGERY

## 2023-01-01 PROCEDURE — 710N000010 HC RECOVERY PHASE 1, LEVEL 2, PER MIN: Performed by: SURGERY

## 2023-01-01 PROCEDURE — 272N000001 HC OR GENERAL SUPPLY STERILE: Performed by: SURGERY

## 2023-01-01 PROCEDURE — 99214 OFFICE O/P EST MOD 30 MIN: CPT | Mod: 95 | Performed by: NURSE PRACTITIONER

## 2023-01-01 PROCEDURE — 99100 ANES PT EXTEME AGE<1 YR&>70: CPT | Performed by: NURSE ANESTHETIST, CERTIFIED REGISTERED

## 2023-01-01 PROCEDURE — 250N000025 HC SEVOFLURANE, PER MIN: Performed by: SURGERY

## 2023-01-01 PROCEDURE — 999N000157 HC STATISTIC RCP TIME EA 10 MIN

## 2023-01-01 PROCEDURE — 88331 PATH CONSLTJ SURG 1 BLK 1SPC: CPT | Mod: 26 | Performed by: PATHOLOGY

## 2023-01-01 PROCEDURE — 86140 C-REACTIVE PROTEIN: CPT | Performed by: PHYSICIAN ASSISTANT

## 2023-01-01 PROCEDURE — 99284 EMERGENCY DEPT VISIT MOD MDM: CPT | Performed by: STUDENT IN AN ORGANIZED HEALTH CARE EDUCATION/TRAINING PROGRAM

## 2023-01-01 PROCEDURE — 45380 COLONOSCOPY AND BIOPSY: CPT | Performed by: NURSE ANESTHETIST, CERTIFIED REGISTERED

## 2023-01-01 PROCEDURE — C9290 INJ, BUPIVACAINE LIPOSOME: HCPCS | Performed by: NURSE ANESTHETIST, CERTIFIED REGISTERED

## 2023-01-01 PROCEDURE — 87040 BLOOD CULTURE FOR BACTERIA: CPT | Performed by: PHYSICIAN ASSISTANT

## 2023-01-01 PROCEDURE — 82565 ASSAY OF CREATININE: CPT | Mod: 91

## 2023-01-01 PROCEDURE — 250N000013 HC RX MED GY IP 250 OP 250 PS 637: Performed by: HOSPITALIST

## 2023-01-01 PROCEDURE — 47100 WEDGE BIOPSY OF LIVER: CPT | Performed by: SURGERY

## 2023-01-01 PROCEDURE — C9290 INJ, BUPIVACAINE LIPOSOME: HCPCS | Performed by: SURGERY

## 2023-01-01 PROCEDURE — 99024 POSTOP FOLLOW-UP VISIT: CPT | Performed by: NURSE PRACTITIONER

## 2023-01-01 PROCEDURE — 96374 THER/PROPH/DIAG INJ IV PUSH: CPT | Mod: XU

## 2023-01-01 PROCEDURE — 250N000011 HC RX IP 250 OP 636: Mod: JZ | Performed by: NURSE ANESTHETIST, CERTIFIED REGISTERED

## 2023-01-01 PROCEDURE — 258N000003 HC RX IP 258 OP 636: Performed by: NURSE PRACTITIONER

## 2023-01-01 PROCEDURE — G0463 HOSPITAL OUTPT CLINIC VISIT: HCPCS | Mod: 25

## 2023-01-01 PROCEDURE — 99284 EMERGENCY DEPT VISIT MOD MDM: CPT | Performed by: PHYSICIAN ASSISTANT

## 2023-01-01 PROCEDURE — 370N000017 HC ANESTHESIA TECHNICAL FEE, PER MIN: Performed by: SURGERY

## 2023-01-01 PROCEDURE — 250N000011 HC RX IP 250 OP 636: Mod: JZ | Performed by: SURGERY

## 2023-01-01 PROCEDURE — 85025 COMPLETE CBC W/AUTO DIFF WBC: CPT | Performed by: PHYSICIAN ASSISTANT

## 2023-01-01 PROCEDURE — 360N000075 HC SURGERY LEVEL 2, PER MIN: Performed by: SURGERY

## 2023-01-01 PROCEDURE — 80053 COMPREHEN METABOLIC PANEL: CPT | Performed by: NURSE PRACTITIONER

## 2023-01-01 PROCEDURE — 250N000013 HC RX MED GY IP 250 OP 250 PS 637: Performed by: NURSE ANESTHETIST, CERTIFIED REGISTERED

## 2023-01-01 PROCEDURE — 96361 HYDRATE IV INFUSION ADD-ON: CPT

## 2023-01-01 PROCEDURE — 250N000011 HC RX IP 250 OP 636: Performed by: INTERNAL MEDICINE

## 2023-01-01 PROCEDURE — 99204 OFFICE O/P NEW MOD 45 MIN: CPT | Performed by: INTERNAL MEDICINE

## 2023-01-01 PROCEDURE — 80053 COMPREHEN METABOLIC PANEL: CPT | Performed by: PHYSICIAN ASSISTANT

## 2023-01-01 PROCEDURE — 250N000013 HC RX MED GY IP 250 OP 250 PS 637: Performed by: NURSE PRACTITIONER

## 2023-01-01 PROCEDURE — 2894A VOIDCORRECT: CPT | Mod: 24 | Performed by: SURGERY

## 2023-01-01 PROCEDURE — 49000 EXPLORATION OF ABDOMEN: CPT | Mod: 78 | Performed by: SURGERY

## 2023-01-01 PROCEDURE — 99140 ANES COMP EMERGENCY COND: CPT | Performed by: NURSE ANESTHETIST, CERTIFIED REGISTERED

## 2023-01-01 PROCEDURE — 96360 HYDRATION IV INFUSION INIT: CPT | Mod: XU

## 2023-01-01 PROCEDURE — 250N000011 HC RX IP 250 OP 636: Performed by: NURSE PRACTITIONER

## 2023-01-01 PROCEDURE — 44139 MOBILIZATION OF COLON: CPT | Performed by: SURGERY

## 2023-01-01 PROCEDURE — 83690 ASSAY OF LIPASE: CPT | Performed by: PHYSICIAN ASSISTANT

## 2023-01-01 PROCEDURE — 99205 OFFICE O/P NEW HI 60 MIN: CPT | Mod: 95 | Performed by: INTERNAL MEDICINE

## 2023-01-01 PROCEDURE — 250N000013 HC RX MED GY IP 250 OP 250 PS 637: Performed by: PHYSICIAN ASSISTANT

## 2023-01-01 PROCEDURE — 49000 EXPLORATION OF ABDOMEN: CPT | Mod: 78 | Performed by: NURSE ANESTHETIST, CERTIFIED REGISTERED

## 2023-01-01 PROCEDURE — 710N000012 HC RECOVERY PHASE 2, PER MINUTE: Performed by: SURGERY

## 2023-01-01 PROCEDURE — 83690 ASSAY OF LIPASE: CPT | Performed by: NURSE PRACTITIONER

## 2023-01-01 PROCEDURE — 360N000076 HC SURGERY LEVEL 3, PER MIN: Performed by: SURGERY

## 2023-01-01 PROCEDURE — 84145 PROCALCITONIN (PCT): CPT | Performed by: EMERGENCY MEDICINE

## 2023-01-01 PROCEDURE — 83605 ASSAY OF LACTIC ACID: CPT | Performed by: SURGERY

## 2023-01-01 PROCEDURE — 99214 OFFICE O/P EST MOD 30 MIN: CPT | Performed by: NURSE PRACTITIONER

## 2023-01-01 PROCEDURE — 44140 PARTIAL REMOVAL OF COLON: CPT | Performed by: SURGERY

## 2023-01-01 RX ORDER — HYDROCODONE BITARTRATE AND ACETAMINOPHEN 5; 325 MG/1; MG/1
1 TABLET ORAL EVERY 6 HOURS PRN
COMMUNITY
End: 2023-01-01 | Stop reason: ALTCHOICE

## 2023-01-01 RX ORDER — CEFAZOLIN SODIUM/WATER 2 G/20 ML
2 SYRINGE (ML) INTRAVENOUS SEE ADMIN INSTRUCTIONS
Status: DISCONTINUED | OUTPATIENT
Start: 2023-01-01 | End: 2023-01-01

## 2023-01-01 RX ORDER — PALONOSETRON 0.05 MG/ML
0.25 INJECTION, SOLUTION INTRAVENOUS ONCE
Status: CANCELLED | OUTPATIENT
Start: 2023-01-01

## 2023-01-01 RX ORDER — ONDANSETRON 4 MG/1
4 TABLET, ORALLY DISINTEGRATING ORAL EVERY 6 HOURS PRN
Status: DISCONTINUED | OUTPATIENT
Start: 2023-01-01 | End: 2023-01-01 | Stop reason: HOSPADM

## 2023-01-01 RX ORDER — NALOXONE HYDROCHLORIDE 0.4 MG/ML
0.2 INJECTION, SOLUTION INTRAMUSCULAR; INTRAVENOUS; SUBCUTANEOUS
Status: DISCONTINUED | OUTPATIENT
Start: 2023-01-01 | End: 2023-01-01 | Stop reason: HOSPADM

## 2023-01-01 RX ORDER — HYDROCODONE BITARTRATE AND ACETAMINOPHEN 5; 325 MG/1; MG/1
1 TABLET ORAL EVERY 6 HOURS PRN
Qty: 18 TABLET | Refills: 0 | Status: SHIPPED | OUTPATIENT
Start: 2023-01-01 | End: 2023-01-01

## 2023-01-01 RX ORDER — HYDROMORPHONE HYDROCHLORIDE 2 MG/1
2 TABLET ORAL EVERY 4 HOURS PRN
Status: DISCONTINUED | OUTPATIENT
Start: 2023-01-01 | End: 2023-01-01 | Stop reason: HOSPADM

## 2023-01-01 RX ORDER — OXYCODONE HYDROCHLORIDE 5 MG/1
10 TABLET ORAL
Status: DISCONTINUED | OUTPATIENT
Start: 2023-01-01 | End: 2023-01-01 | Stop reason: HOSPADM

## 2023-01-01 RX ORDER — ALBUTEROL SULFATE 0.83 MG/ML
2.5 SOLUTION RESPIRATORY (INHALATION)
Status: CANCELLED | OUTPATIENT
Start: 2023-01-01

## 2023-01-01 RX ORDER — HYDROCODONE BITARTRATE AND ACETAMINOPHEN 5; 325 MG/1; MG/1
1 TABLET ORAL EVERY 6 HOURS PRN
Qty: 18 TABLET | Refills: 0 | Status: ON HOLD | OUTPATIENT
Start: 2023-01-01 | End: 2023-01-01

## 2023-01-01 RX ORDER — LORAZEPAM 2 MG/ML
0.5 INJECTION INTRAMUSCULAR EVERY 4 HOURS PRN
Status: CANCELLED | OUTPATIENT
Start: 2023-01-01

## 2023-01-01 RX ORDER — HYDROMORPHONE HCL IN WATER/PF 6 MG/30 ML
0.2 PATIENT CONTROLLED ANALGESIA SYRINGE INTRAVENOUS
Status: DISCONTINUED | OUTPATIENT
Start: 2023-01-01 | End: 2023-01-01 | Stop reason: HOSPADM

## 2023-01-01 RX ORDER — KETAMINE HYDROCHLORIDE 10 MG/ML
INJECTION INTRAMUSCULAR; INTRAVENOUS PRN
Status: DISCONTINUED | OUTPATIENT
Start: 2023-01-01 | End: 2023-01-01

## 2023-01-01 RX ORDER — HYDROMORPHONE HYDROCHLORIDE 1 MG/ML
0.2 INJECTION, SOLUTION INTRAMUSCULAR; INTRAVENOUS; SUBCUTANEOUS EVERY 5 MIN PRN
Status: DISCONTINUED | OUTPATIENT
Start: 2023-01-01 | End: 2023-01-01 | Stop reason: HOSPADM

## 2023-01-01 RX ORDER — NALOXONE HYDROCHLORIDE 0.4 MG/ML
0.4 INJECTION, SOLUTION INTRAMUSCULAR; INTRAVENOUS; SUBCUTANEOUS
Status: DISCONTINUED | OUTPATIENT
Start: 2023-01-01 | End: 2023-01-01 | Stop reason: HOSPADM

## 2023-01-01 RX ORDER — DIPHENHYDRAMINE HYDROCHLORIDE 50 MG/ML
50 INJECTION INTRAMUSCULAR; INTRAVENOUS
Status: CANCELLED
Start: 2023-01-01

## 2023-01-01 RX ORDER — ONDANSETRON 8 MG/1
8 TABLET, ORALLY DISINTEGRATING ORAL EVERY 8 HOURS PRN
Qty: 45 TABLET | Refills: 1 | Status: SHIPPED | OUTPATIENT
Start: 2023-01-01

## 2023-01-01 RX ORDER — HYDROMORPHONE HYDROCHLORIDE 1 MG/ML
0.4 INJECTION, SOLUTION INTRAMUSCULAR; INTRAVENOUS; SUBCUTANEOUS EVERY 5 MIN PRN
Status: DISCONTINUED | OUTPATIENT
Start: 2023-01-01 | End: 2023-01-01 | Stop reason: HOSPADM

## 2023-01-01 RX ORDER — OXYCODONE HYDROCHLORIDE 5 MG/1
5 TABLET ORAL
Status: CANCELLED | OUTPATIENT
Start: 2023-01-01

## 2023-01-01 RX ORDER — LABETALOL 20 MG/4 ML (5 MG/ML) INTRAVENOUS SYRINGE
10
Status: CANCELLED | OUTPATIENT
Start: 2023-01-01

## 2023-01-01 RX ORDER — DIAZEPAM 5 MG
5 TABLET ORAL EVERY 6 HOURS PRN
Status: DISCONTINUED | OUTPATIENT
Start: 2023-01-01 | End: 2023-01-01 | Stop reason: HOSPADM

## 2023-01-01 RX ORDER — ONDANSETRON 2 MG/ML
4 INJECTION INTRAMUSCULAR; INTRAVENOUS EVERY 30 MIN PRN
Status: CANCELLED | OUTPATIENT
Start: 2023-01-01

## 2023-01-01 RX ORDER — NEOMYCIN SULFATE 500 MG/1
TABLET ORAL
Qty: 6 TABLET | Refills: 0 | Status: ON HOLD | OUTPATIENT
Start: 2023-01-01 | End: 2023-01-01

## 2023-01-01 RX ORDER — FENTANYL CITRATE 50 UG/ML
INJECTION, SOLUTION INTRAMUSCULAR; INTRAVENOUS PRN
Status: DISCONTINUED | OUTPATIENT
Start: 2023-01-01 | End: 2023-01-01

## 2023-01-01 RX ORDER — CLOPIDOGREL BISULFATE 75 MG/1
75 TABLET ORAL DAILY
COMMUNITY
End: 2023-01-01

## 2023-01-01 RX ORDER — HYDROMORPHONE HYDROCHLORIDE 1 MG/ML
0.5 INJECTION, SOLUTION INTRAMUSCULAR; INTRAVENOUS; SUBCUTANEOUS EVERY 5 MIN PRN
Status: CANCELLED | OUTPATIENT
Start: 2023-01-01

## 2023-01-01 RX ORDER — IOPAMIDOL 755 MG/ML
80 INJECTION, SOLUTION INTRAVASCULAR ONCE
Status: COMPLETED | OUTPATIENT
Start: 2023-01-01 | End: 2023-01-01

## 2023-01-01 RX ORDER — ONDANSETRON 2 MG/ML
4 INJECTION INTRAMUSCULAR; INTRAVENOUS ONCE
Status: COMPLETED | OUTPATIENT
Start: 2023-01-01 | End: 2023-01-01

## 2023-01-01 RX ORDER — SODIUM CHLORIDE, SODIUM LACTATE, POTASSIUM CHLORIDE, CALCIUM CHLORIDE 600; 310; 30; 20 MG/100ML; MG/100ML; MG/100ML; MG/100ML
INJECTION, SOLUTION INTRAVENOUS CONTINUOUS
Status: DISCONTINUED | OUTPATIENT
Start: 2023-01-01 | End: 2023-01-01 | Stop reason: HOSPADM

## 2023-01-01 RX ORDER — ONDANSETRON 2 MG/ML
INJECTION INTRAMUSCULAR; INTRAVENOUS PRN
Status: DISCONTINUED | OUTPATIENT
Start: 2023-01-01 | End: 2023-01-01

## 2023-01-01 RX ORDER — FENTANYL CITRATE 50 UG/ML
25 INJECTION, SOLUTION INTRAMUSCULAR; INTRAVENOUS EVERY 5 MIN PRN
Status: DISCONTINUED | OUTPATIENT
Start: 2023-01-01 | End: 2023-01-01 | Stop reason: HOSPADM

## 2023-01-01 RX ORDER — PROCHLORPERAZINE MALEATE 5 MG
5 TABLET ORAL EVERY 6 HOURS PRN
Status: DISCONTINUED | OUTPATIENT
Start: 2023-01-01 | End: 2023-01-01 | Stop reason: HOSPADM

## 2023-01-01 RX ORDER — OXYCODONE HYDROCHLORIDE 5 MG/1
5 TABLET ORAL EVERY 4 HOURS
Qty: 84 TABLET | Refills: 0 | Status: SHIPPED | OUTPATIENT
Start: 2023-01-01 | End: 2023-01-01

## 2023-01-01 RX ORDER — HYDROCODONE BITARTRATE AND ACETAMINOPHEN 5; 325 MG/1; MG/1
1 TABLET ORAL EVERY 6 HOURS PRN
Status: DISCONTINUED | OUTPATIENT
Start: 2023-01-01 | End: 2023-01-01

## 2023-01-01 RX ORDER — HYDRALAZINE HYDROCHLORIDE 20 MG/ML
2.5-5 INJECTION INTRAMUSCULAR; INTRAVENOUS EVERY 10 MIN PRN
Status: DISCONTINUED | OUTPATIENT
Start: 2023-01-01 | End: 2023-01-01 | Stop reason: HOSPADM

## 2023-01-01 RX ORDER — MORPHINE SULFATE 2 MG/ML
2 INJECTION, SOLUTION INTRAMUSCULAR; INTRAVENOUS ONCE
Status: COMPLETED | OUTPATIENT
Start: 2023-01-01 | End: 2023-01-01

## 2023-01-01 RX ORDER — ALBUTEROL SULFATE 90 UG/1
1-2 AEROSOL, METERED RESPIRATORY (INHALATION)
Status: CANCELLED
Start: 2023-01-01

## 2023-01-01 RX ORDER — FENTANYL CITRATE 50 UG/ML
50 INJECTION, SOLUTION INTRAMUSCULAR; INTRAVENOUS EVERY 5 MIN PRN
Status: DISCONTINUED | OUTPATIENT
Start: 2023-01-01 | End: 2023-01-01 | Stop reason: HOSPADM

## 2023-01-01 RX ORDER — ONDANSETRON 4 MG/1
4 TABLET, ORALLY DISINTEGRATING ORAL EVERY 30 MIN PRN
Status: CANCELLED | OUTPATIENT
Start: 2023-01-01

## 2023-01-01 RX ORDER — OMEPRAZOLE 40 MG/1
CAPSULE, DELAYED RELEASE ORAL
Qty: 90 CAPSULE | Refills: 3 | OUTPATIENT
Start: 2023-01-01

## 2023-01-01 RX ORDER — DEXAMETHASONE SODIUM PHOSPHATE 4 MG/ML
INJECTION, SOLUTION INTRA-ARTICULAR; INTRALESIONAL; INTRAMUSCULAR; INTRAVENOUS; SOFT TISSUE PRN
Status: DISCONTINUED | OUTPATIENT
Start: 2023-01-01 | End: 2023-01-01

## 2023-01-01 RX ORDER — ONDANSETRON 2 MG/ML
4 INJECTION INTRAMUSCULAR; INTRAVENOUS EVERY 30 MIN PRN
Status: DISCONTINUED | OUTPATIENT
Start: 2023-01-01 | End: 2023-01-01 | Stop reason: HOSPADM

## 2023-01-01 RX ORDER — PROPOFOL 10 MG/ML
INJECTION, EMULSION INTRAVENOUS PRN
Status: DISCONTINUED | OUTPATIENT
Start: 2023-01-01 | End: 2023-01-01

## 2023-01-01 RX ORDER — SODIUM CHLORIDE 9 MG/ML
INJECTION, SOLUTION INTRAVENOUS CONTINUOUS
Status: DISCONTINUED | OUTPATIENT
Start: 2023-01-01 | End: 2023-01-01 | Stop reason: HOSPADM

## 2023-01-01 RX ORDER — ALBUTEROL SULFATE 0.83 MG/ML
2.5 SOLUTION RESPIRATORY (INHALATION) EVERY 4 HOURS PRN
Status: DISCONTINUED | OUTPATIENT
Start: 2023-01-01 | End: 2023-01-01 | Stop reason: HOSPADM

## 2023-01-01 RX ORDER — POLYETHYLENE GLYCOL 3350 17 G/17G
1 POWDER, FOR SOLUTION ORAL DAILY
Qty: 238 G | Refills: 0 | Status: ON HOLD | OUTPATIENT
Start: 2023-01-01 | End: 2023-01-01

## 2023-01-01 RX ORDER — DEXAMETHASONE SODIUM PHOSPHATE 10 MG/ML
INJECTION, SOLUTION INTRAMUSCULAR; INTRAVENOUS
Status: COMPLETED | OUTPATIENT
Start: 2023-01-01 | End: 2023-01-01

## 2023-01-01 RX ORDER — HYDROMORPHONE HYDROCHLORIDE 1 MG/ML
0.5 INJECTION, SOLUTION INTRAMUSCULAR; INTRAVENOUS; SUBCUTANEOUS
Status: DISCONTINUED | OUTPATIENT
Start: 2023-01-01 | End: 2023-01-01

## 2023-01-01 RX ORDER — ACETAMINOPHEN 325 MG/1
650 TABLET ORAL EVERY 4 HOURS PRN
Status: DISCONTINUED | OUTPATIENT
Start: 2023-01-01 | End: 2023-01-01 | Stop reason: HOSPADM

## 2023-01-01 RX ORDER — LIDOCAINE 40 MG/G
CREAM TOPICAL
Status: DISCONTINUED | OUTPATIENT
Start: 2023-01-01 | End: 2023-01-01

## 2023-01-01 RX ORDER — HEPARIN SODIUM (PORCINE) LOCK FLUSH IV SOLN 100 UNIT/ML 100 UNIT/ML
5 SOLUTION INTRAVENOUS
Status: CANCELLED | OUTPATIENT
Start: 2023-01-01

## 2023-01-01 RX ORDER — METOPROLOL SUCCINATE 25 MG/1
12.5 TABLET, EXTENDED RELEASE ORAL EVERY MORNING
COMMUNITY
Start: 2023-01-01

## 2023-01-01 RX ORDER — PROCHLORPERAZINE 25 MG
12.5 SUPPOSITORY, RECTAL RECTAL EVERY 12 HOURS PRN
Status: DISCONTINUED | OUTPATIENT
Start: 2023-01-01 | End: 2023-01-01 | Stop reason: HOSPADM

## 2023-01-01 RX ORDER — OXYCODONE HYDROCHLORIDE 5 MG/1
5 TABLET ORAL
Status: DISCONTINUED | OUTPATIENT
Start: 2023-01-01 | End: 2023-01-01 | Stop reason: HOSPADM

## 2023-01-01 RX ORDER — METHYLPREDNISOLONE SODIUM SUCCINATE 125 MG/2ML
125 INJECTION, POWDER, LYOPHILIZED, FOR SOLUTION INTRAMUSCULAR; INTRAVENOUS
Status: CANCELLED
Start: 2023-01-01

## 2023-01-01 RX ORDER — LIDOCAINE 40 MG/G
CREAM TOPICAL
Status: DISCONTINUED | OUTPATIENT
Start: 2023-01-01 | End: 2023-01-01 | Stop reason: HOSPADM

## 2023-01-01 RX ORDER — IOPAMIDOL 755 MG/ML
58 INJECTION, SOLUTION INTRAVASCULAR ONCE
Status: COMPLETED | OUTPATIENT
Start: 2023-01-01 | End: 2023-01-01

## 2023-01-01 RX ORDER — ENOXAPARIN SODIUM 100 MG/ML
40 INJECTION SUBCUTANEOUS EVERY 24 HOURS
Status: DISCONTINUED | OUTPATIENT
Start: 2023-01-01 | End: 2023-01-01 | Stop reason: HOSPADM

## 2023-01-01 RX ORDER — ONDANSETRON 4 MG/1
4 TABLET, ORALLY DISINTEGRATING ORAL EVERY 30 MIN PRN
Status: DISCONTINUED | OUTPATIENT
Start: 2023-01-01 | End: 2023-01-01 | Stop reason: HOSPADM

## 2023-01-01 RX ORDER — SODIUM CHLORIDE, SODIUM LACTATE, POTASSIUM CHLORIDE, CALCIUM CHLORIDE 600; 310; 30; 20 MG/100ML; MG/100ML; MG/100ML; MG/100ML
INJECTION, SOLUTION INTRAVENOUS CONTINUOUS
Status: DISCONTINUED | OUTPATIENT
Start: 2023-01-01 | End: 2023-01-01

## 2023-01-01 RX ORDER — ONDANSETRON 4 MG/1
4 TABLET, ORALLY DISINTEGRATING ORAL EVERY 6 HOURS PRN
Status: DISCONTINUED | OUTPATIENT
Start: 2023-01-01 | End: 2023-01-01

## 2023-01-01 RX ORDER — HEPARIN SODIUM,PORCINE 10 UNIT/ML
5-20 VIAL (ML) INTRAVENOUS DAILY PRN
Status: CANCELLED | OUTPATIENT
Start: 2023-01-01

## 2023-01-01 RX ORDER — HYDROMORPHONE HCL IN WATER/PF 6 MG/30 ML
0.4 PATIENT CONTROLLED ANALGESIA SYRINGE INTRAVENOUS
Status: DISCONTINUED | OUTPATIENT
Start: 2023-01-01 | End: 2023-01-01 | Stop reason: HOSPADM

## 2023-01-01 RX ORDER — ONDANSETRON 2 MG/ML
4 INJECTION INTRAMUSCULAR; INTRAVENOUS EVERY 6 HOURS PRN
Status: DISCONTINUED | OUTPATIENT
Start: 2023-01-01 | End: 2023-01-01 | Stop reason: HOSPADM

## 2023-01-01 RX ORDER — LABETALOL 20 MG/4 ML (5 MG/ML) INTRAVENOUS SYRINGE
10
Status: DISCONTINUED | OUTPATIENT
Start: 2023-01-01 | End: 2023-01-01 | Stop reason: HOSPADM

## 2023-01-01 RX ORDER — FENTANYL CITRATE 50 UG/ML
25 INJECTION, SOLUTION INTRAMUSCULAR; INTRAVENOUS
Status: CANCELLED | OUTPATIENT
Start: 2023-01-01

## 2023-01-01 RX ORDER — MEPERIDINE HYDROCHLORIDE 25 MG/ML
25 INJECTION INTRAMUSCULAR; INTRAVENOUS; SUBCUTANEOUS EVERY 30 MIN PRN
Status: CANCELLED | OUTPATIENT
Start: 2023-01-01

## 2023-01-01 RX ORDER — OXYCODONE HYDROCHLORIDE 10 MG/1
10 TABLET ORAL EVERY 4 HOURS
Qty: 84 TABLET | Refills: 0 | Status: SHIPPED | OUTPATIENT
Start: 2023-01-01

## 2023-01-01 RX ORDER — METOPROLOL SUCCINATE 25 MG/1
25 TABLET, EXTENDED RELEASE ORAL DAILY
Status: COMPLETED | OUTPATIENT
Start: 2023-01-01 | End: 2023-01-01

## 2023-01-01 RX ORDER — MAGNESIUM HYDROXIDE/ALUMINUM HYDROXICE/SIMETHICONE 120; 1200; 1200 MG/30ML; MG/30ML; MG/30ML
15 SUSPENSION ORAL ONCE
Status: COMPLETED | OUTPATIENT
Start: 2023-01-01 | End: 2023-01-01

## 2023-01-01 RX ORDER — IOPAMIDOL 755 MG/ML
74 INJECTION, SOLUTION INTRAVASCULAR ONCE
Status: COMPLETED | OUTPATIENT
Start: 2023-01-01 | End: 2023-01-01

## 2023-01-01 RX ORDER — LIDOCAINE HYDROCHLORIDE 20 MG/ML
INJECTION, SOLUTION INFILTRATION; PERINEURAL PRN
Status: DISCONTINUED | OUTPATIENT
Start: 2023-01-01 | End: 2023-01-01

## 2023-01-01 RX ORDER — ONDANSETRON 4 MG/1
4 TABLET, ORALLY DISINTEGRATING ORAL EVERY 30 MIN PRN
Status: DISCONTINUED | OUTPATIENT
Start: 2023-01-01 | End: 2023-01-01

## 2023-01-01 RX ORDER — CEFAZOLIN SODIUM/WATER 2 G/20 ML
2 SYRINGE (ML) INTRAVENOUS
Status: COMPLETED | OUTPATIENT
Start: 2023-01-01 | End: 2023-01-01

## 2023-01-01 RX ORDER — FENTANYL CITRATE 50 UG/ML
50 INJECTION, SOLUTION INTRAMUSCULAR; INTRAVENOUS EVERY 5 MIN PRN
Status: CANCELLED | OUTPATIENT
Start: 2023-01-01

## 2023-01-01 RX ORDER — HALOPERIDOL 5 MG/ML
1 INJECTION INTRAMUSCULAR
Status: DISCONTINUED | OUTPATIENT
Start: 2023-01-01 | End: 2023-01-01 | Stop reason: HOSPADM

## 2023-01-01 RX ORDER — SODIUM CHLORIDE, SODIUM LACTATE, POTASSIUM CHLORIDE, CALCIUM CHLORIDE 600; 310; 30; 20 MG/100ML; MG/100ML; MG/100ML; MG/100ML
INJECTION, SOLUTION INTRAVENOUS CONTINUOUS
Status: CANCELLED | OUTPATIENT
Start: 2023-01-01

## 2023-01-01 RX ORDER — LACTULOSE 10 G/15ML
10 SOLUTION ORAL 2 TIMES DAILY
Qty: 473 ML | Refills: 0 | Status: ON HOLD | OUTPATIENT
Start: 2023-01-01 | End: 2023-01-01

## 2023-01-01 RX ORDER — ACETAMINOPHEN 325 MG/1
975 TABLET ORAL EVERY 8 HOURS
Status: DISCONTINUED | OUTPATIENT
Start: 2023-01-01 | End: 2023-01-01 | Stop reason: HOSPADM

## 2023-01-01 RX ORDER — HYDRALAZINE HYDROCHLORIDE 20 MG/ML
2.5-5 INJECTION INTRAMUSCULAR; INTRAVENOUS EVERY 10 MIN PRN
Status: CANCELLED | OUTPATIENT
Start: 2023-01-01

## 2023-01-01 RX ORDER — EPINEPHRINE 1 MG/ML
0.3 INJECTION, SOLUTION, CONCENTRATE INTRAVENOUS EVERY 5 MIN PRN
Status: CANCELLED | OUTPATIENT
Start: 2023-01-01

## 2023-01-01 RX ORDER — NITROGLYCERIN 0.4 MG/1
0.4 TABLET SUBLINGUAL EVERY 5 MIN PRN
Status: DISCONTINUED | OUTPATIENT
Start: 2023-01-01 | End: 2023-01-01 | Stop reason: HOSPADM

## 2023-01-01 RX ORDER — HYDROMORPHONE HYDROCHLORIDE 1 MG/ML
0.5 INJECTION, SOLUTION INTRAMUSCULAR; INTRAVENOUS; SUBCUTANEOUS
Status: DISCONTINUED | OUTPATIENT
Start: 2023-01-01 | End: 2023-01-01 | Stop reason: HOSPADM

## 2023-01-01 RX ORDER — CLOPIDOGREL BISULFATE 75 MG/1
75 TABLET ORAL DAILY
Qty: 90 TABLET | Refills: 1 | Status: SHIPPED | OUTPATIENT
Start: 2023-01-01 | End: 2023-01-01 | Stop reason: ALTCHOICE

## 2023-01-01 RX ORDER — LIDOCAINE 40 MG/G
CREAM TOPICAL
Status: CANCELLED | OUTPATIENT
Start: 2023-01-01

## 2023-01-01 RX ORDER — OMEPRAZOLE 40 MG/1
40 CAPSULE, DELAYED RELEASE ORAL DAILY
Qty: 30 CAPSULE | Refills: 1 | Status: SHIPPED | OUTPATIENT
Start: 2023-01-01 | End: 2023-01-01

## 2023-01-01 RX ORDER — HYDROMORPHONE HYDROCHLORIDE 1 MG/ML
0.5 SOLUTION ORAL
Status: CANCELLED | OUTPATIENT
Start: 2023-01-01

## 2023-01-01 RX ORDER — OMEPRAZOLE 40 MG/1
CAPSULE, DELAYED RELEASE ORAL
Qty: 90 CAPSULE | Refills: 3 | Status: SHIPPED | OUTPATIENT
Start: 2023-01-01

## 2023-01-01 RX ORDER — ONDANSETRON 2 MG/ML
4 INJECTION INTRAMUSCULAR; INTRAVENOUS EVERY 30 MIN PRN
Status: DISCONTINUED | OUTPATIENT
Start: 2023-01-01 | End: 2023-01-01

## 2023-01-01 RX ORDER — BUPIVACAINE HYDROCHLORIDE 2.5 MG/ML
INJECTION, SOLUTION EPIDURAL; INFILTRATION; INTRACAUDAL
Status: COMPLETED | OUTPATIENT
Start: 2023-01-01 | End: 2023-01-01

## 2023-01-01 RX ORDER — FAMOTIDINE 20 MG/1
20 TABLET, FILM COATED ORAL 2 TIMES DAILY
Status: DISCONTINUED | OUTPATIENT
Start: 2023-01-01 | End: 2023-01-01 | Stop reason: HOSPADM

## 2023-01-01 RX ORDER — METRONIDAZOLE 500 MG/100ML
500 INJECTION, SOLUTION INTRAVENOUS
Status: COMPLETED | OUTPATIENT
Start: 2023-01-01 | End: 2023-01-01

## 2023-01-01 RX ORDER — SODIUM CHLORIDE, SODIUM LACTATE, POTASSIUM CHLORIDE, CALCIUM CHLORIDE 600; 310; 30; 20 MG/100ML; MG/100ML; MG/100ML; MG/100ML
INJECTION, SOLUTION INTRAVENOUS CONTINUOUS PRN
Status: DISCONTINUED | OUTPATIENT
Start: 2023-01-01 | End: 2023-01-01

## 2023-01-01 RX ORDER — BISACODYL 5 MG/1
10 TABLET, DELAYED RELEASE ORAL SEE ADMIN INSTRUCTIONS
Qty: 4 TABLET | Refills: 0 | Status: ON HOLD | OUTPATIENT
Start: 2023-01-01 | End: 2023-01-01

## 2023-01-01 RX ORDER — ERYTHROMYCIN 250 MG/1
TABLET, COATED ORAL
Qty: 12 TABLET | Refills: 0 | Status: ON HOLD | OUTPATIENT
Start: 2023-01-01 | End: 2023-01-01

## 2023-01-01 RX ORDER — HYDROMORPHONE HYDROCHLORIDE 2 MG/1
4 TABLET ORAL EVERY 4 HOURS PRN
Status: DISCONTINUED | OUTPATIENT
Start: 2023-01-01 | End: 2023-01-01 | Stop reason: HOSPADM

## 2023-01-01 RX ORDER — CEFTRIAXONE SODIUM 1 G/50ML
1 INJECTION, SOLUTION INTRAVENOUS EVERY 24 HOURS
Status: DISCONTINUED | OUTPATIENT
Start: 2023-01-01 | End: 2023-01-01 | Stop reason: HOSPADM

## 2023-01-01 RX ORDER — ONDANSETRON 2 MG/ML
4 INJECTION INTRAMUSCULAR; INTRAVENOUS EVERY 6 HOURS PRN
Status: DISCONTINUED | OUTPATIENT
Start: 2023-01-01 | End: 2023-01-01

## 2023-01-01 RX ADMIN — HYDROMORPHONE HYDROCHLORIDE 0.5 MG: 1 INJECTION, SOLUTION INTRAMUSCULAR; INTRAVENOUS; SUBCUTANEOUS at 13:15

## 2023-01-01 RX ADMIN — LIDOCAINE HYDROCHLORIDE 40 MG: 20 INJECTION, SOLUTION INFILTRATION; PERINEURAL at 14:57

## 2023-01-01 RX ADMIN — HYDROMORPHONE HYDROCHLORIDE 0.4 MG: 0.2 INJECTION, SOLUTION INTRAMUSCULAR; INTRAVENOUS; SUBCUTANEOUS at 18:08

## 2023-01-01 RX ADMIN — ENOXAPARIN SODIUM 40 MG: 40 INJECTION SUBCUTANEOUS at 08:43

## 2023-01-01 RX ADMIN — ENOXAPARIN SODIUM 40 MG: 40 INJECTION SUBCUTANEOUS at 11:12

## 2023-01-01 RX ADMIN — SODIUM CHLORIDE, PRESERVATIVE FREE: 5 INJECTION INTRAVENOUS at 20:19

## 2023-01-01 RX ADMIN — SODIUM CHLORIDE, PRESERVATIVE FREE: 5 INJECTION INTRAVENOUS at 00:42

## 2023-01-01 RX ADMIN — Medication 100 MG: at 11:34

## 2023-01-01 RX ADMIN — MIDAZOLAM 2 MG: 1 INJECTION INTRAMUSCULAR; INTRAVENOUS at 14:48

## 2023-01-01 RX ADMIN — SODIUM CHLORIDE 1000 ML: 9 INJECTION, SOLUTION INTRAVENOUS at 19:27

## 2023-01-01 RX ADMIN — BUPIVACAINE HYDROCHLORIDE 50 ML: 2.5 INJECTION, SOLUTION EPIDURAL; INFILTRATION; INTRACAUDAL at 13:47

## 2023-01-01 RX ADMIN — SODIUM CHLORIDE, POTASSIUM CHLORIDE, SODIUM LACTATE AND CALCIUM CHLORIDE: 600; 310; 30; 20 INJECTION, SOLUTION INTRAVENOUS at 08:37

## 2023-01-01 RX ADMIN — SUGAMMADEX 400 MG: 100 INJECTION, SOLUTION INTRAVENOUS at 12:13

## 2023-01-01 RX ADMIN — PROPOFOL 50 MG: 10 INJECTION, EMULSION INTRAVENOUS at 14:36

## 2023-01-01 RX ADMIN — SODIUM CHLORIDE, POTASSIUM CHLORIDE, SODIUM LACTATE AND CALCIUM CHLORIDE: 600; 310; 30; 20 INJECTION, SOLUTION INTRAVENOUS at 12:10

## 2023-01-01 RX ADMIN — SODIUM CHLORIDE, POTASSIUM CHLORIDE, SODIUM LACTATE AND CALCIUM CHLORIDE: 600; 310; 30; 20 INJECTION, SOLUTION INTRAVENOUS at 03:50

## 2023-01-01 RX ADMIN — ONDANSETRON 4 MG: 2 INJECTION INTRAMUSCULAR; INTRAVENOUS at 05:03

## 2023-01-01 RX ADMIN — PHENYLEPHRINE HYDROCHLORIDE 200 MCG: 10 INJECTION INTRAVENOUS at 15:00

## 2023-01-01 RX ADMIN — IOPAMIDOL 74 ML: 755 INJECTION, SOLUTION INTRAVENOUS at 19:02

## 2023-01-01 RX ADMIN — ACETAMINOPHEN 975 MG: 325 TABLET, FILM COATED ORAL at 10:57

## 2023-01-01 RX ADMIN — PROPOFOL 150 MG: 10 INJECTION, EMULSION INTRAVENOUS at 14:57

## 2023-01-01 RX ADMIN — HYDROMORPHONE HYDROCHLORIDE 0.5 MG: 1 INJECTION, SOLUTION INTRAMUSCULAR; INTRAVENOUS; SUBCUTANEOUS at 23:55

## 2023-01-01 RX ADMIN — FENTANYL CITRATE 100 MCG: 50 INJECTION, SOLUTION INTRAMUSCULAR; INTRAVENOUS at 16:19

## 2023-01-01 RX ADMIN — SUGAMMADEX 200 MG: 100 INJECTION, SOLUTION INTRAVENOUS at 16:15

## 2023-01-01 RX ADMIN — ACETAMINOPHEN 975 MG: 325 TABLET, FILM COATED ORAL at 05:14

## 2023-01-01 RX ADMIN — ENOXAPARIN SODIUM 40 MG: 40 INJECTION SUBCUTANEOUS at 08:02

## 2023-01-01 RX ADMIN — FENTANYL CITRATE 50 MCG: 50 INJECTION, SOLUTION INTRAMUSCULAR; INTRAVENOUS at 11:53

## 2023-01-01 RX ADMIN — MORPHINE SULFATE 2 MG: 2 INJECTION, SOLUTION INTRAMUSCULAR; INTRAVENOUS at 19:26

## 2023-01-01 RX ADMIN — SODIUM CHLORIDE, POTASSIUM CHLORIDE, SODIUM LACTATE AND CALCIUM CHLORIDE 1000 ML: 600; 310; 30; 20 INJECTION, SOLUTION INTRAVENOUS at 17:48

## 2023-01-01 RX ADMIN — ROCURONIUM BROMIDE 50 MG: 10 INJECTION INTRAVENOUS at 15:06

## 2023-01-01 RX ADMIN — ONDANSETRON 4 MG: 2 INJECTION INTRAMUSCULAR; INTRAVENOUS at 12:00

## 2023-01-01 RX ADMIN — SODIUM CHLORIDE, POTASSIUM CHLORIDE, SODIUM LACTATE AND CALCIUM CHLORIDE: 600; 310; 30; 20 INJECTION, SOLUTION INTRAVENOUS at 15:13

## 2023-01-01 RX ADMIN — ACETAMINOPHEN 975 MG: 325 TABLET, FILM COATED ORAL at 01:58

## 2023-01-01 RX ADMIN — CEFTRIAXONE SODIUM 1 G: 1 INJECTION, SOLUTION INTRAVENOUS at 07:47

## 2023-01-01 RX ADMIN — PROPOFOL 50 MG: 10 INJECTION, EMULSION INTRAVENOUS at 14:32

## 2023-01-01 RX ADMIN — ACETAMINOPHEN 975 MG: 325 TABLET, FILM COATED ORAL at 17:57

## 2023-01-01 RX ADMIN — SODIUM CHLORIDE, POTASSIUM CHLORIDE, SODIUM LACTATE AND CALCIUM CHLORIDE: 600; 310; 30; 20 INJECTION, SOLUTION INTRAVENOUS at 11:13

## 2023-01-01 RX ADMIN — HYDROMORPHONE HYDROCHLORIDE 0.2 MG: 0.2 INJECTION, SOLUTION INTRAMUSCULAR; INTRAVENOUS; SUBCUTANEOUS at 15:13

## 2023-01-01 RX ADMIN — DEXAMETHASONE SODIUM PHOSPHATE 10 MG: 10 INJECTION, SOLUTION INTRAMUSCULAR; INTRAVENOUS at 13:47

## 2023-01-01 RX ADMIN — HYDROCODONE BITARTRATE AND ACETAMINOPHEN 1 TABLET: 5; 325 TABLET ORAL at 16:53

## 2023-01-01 RX ADMIN — SODIUM CHLORIDE 1000 ML: 9 INJECTION, SOLUTION INTRAVENOUS at 20:23

## 2023-01-01 RX ADMIN — HYDROMORPHONE HYDROCHLORIDE 0.5 MG: 1 INJECTION, SOLUTION INTRAMUSCULAR; INTRAVENOUS; SUBCUTANEOUS at 20:18

## 2023-01-01 RX ADMIN — ONDANSETRON 4 MG: 2 INJECTION INTRAMUSCULAR; INTRAVENOUS at 15:44

## 2023-01-01 RX ADMIN — SODIUM CHLORIDE: 9 INJECTION, SOLUTION INTRAVENOUS at 17:44

## 2023-01-01 RX ADMIN — BUPIVACAINE 20 ML: 13.3 INJECTION, SUSPENSION, LIPOSOMAL INFILTRATION at 13:47

## 2023-01-01 RX ADMIN — FAMOTIDINE 20 MG: 10 INJECTION, SOLUTION INTRAVENOUS at 20:16

## 2023-01-01 RX ADMIN — ACETAMINOPHEN 975 MG: 325 TABLET, FILM COATED ORAL at 20:02

## 2023-01-01 RX ADMIN — ALUMINUM HYDROXIDE, MAGNESIUM HYDROXIDE, AND SIMETHICONE 15 ML: 200; 200; 20 SUSPENSION ORAL at 21:49

## 2023-01-01 RX ADMIN — IOPAMIDOL 58 ML: 755 INJECTION, SOLUTION INTRAVENOUS at 13:35

## 2023-01-01 RX ADMIN — PROCHLORPERAZINE EDISYLATE 5 MG: 5 INJECTION INTRAMUSCULAR; INTRAVENOUS at 17:48

## 2023-01-01 RX ADMIN — PROCHLORPERAZINE EDISYLATE 5 MG: 5 INJECTION INTRAMUSCULAR; INTRAVENOUS at 00:13

## 2023-01-01 RX ADMIN — CEFTRIAXONE SODIUM 1 G: 1 INJECTION, SOLUTION INTRAVENOUS at 08:44

## 2023-01-01 RX ADMIN — HYDROCODONE BITARTRATE AND ACETAMINOPHEN 1 TABLET: 5; 325 TABLET ORAL at 00:13

## 2023-01-01 RX ADMIN — PROPOFOL 50 MG: 10 INJECTION, EMULSION INTRAVENOUS at 14:28

## 2023-01-01 RX ADMIN — METOPROLOL SUCCINATE 12.5 MG: 25 TABLET, EXTENDED RELEASE ORAL at 12:24

## 2023-01-01 RX ADMIN — SODIUM CHLORIDE, POTASSIUM CHLORIDE, SODIUM LACTATE AND CALCIUM CHLORIDE: 600; 310; 30; 20 INJECTION, SOLUTION INTRAVENOUS at 17:58

## 2023-01-01 RX ADMIN — ROCURONIUM BROMIDE 5 MG: 10 INJECTION INTRAVENOUS at 11:34

## 2023-01-01 RX ADMIN — SODIUM CHLORIDE, PRESERVATIVE FREE: 5 INJECTION INTRAVENOUS at 07:56

## 2023-01-01 RX ADMIN — FAMOTIDINE 20 MG: 10 INJECTION, SOLUTION INTRAVENOUS at 08:49

## 2023-01-01 RX ADMIN — METOPROLOL SUCCINATE 25 MG: 25 TABLET, EXTENDED RELEASE ORAL at 12:04

## 2023-01-01 RX ADMIN — HYDROCODONE BITARTRATE AND ACETAMINOPHEN 1 TABLET: 5; 325 TABLET ORAL at 05:03

## 2023-01-01 RX ADMIN — ENOXAPARIN SODIUM 40 MG: 40 INJECTION SUBCUTANEOUS at 12:51

## 2023-01-01 RX ADMIN — METRONIDAZOLE 500 MG: 500 INJECTION, SOLUTION INTRAVENOUS at 14:11

## 2023-01-01 RX ADMIN — HYDROMORPHONE HYDROCHLORIDE 0.4 MG: 0.2 INJECTION, SOLUTION INTRAMUSCULAR; INTRAVENOUS; SUBCUTANEOUS at 20:11

## 2023-01-01 RX ADMIN — FENTANYL CITRATE 50 MCG: 50 INJECTION INTRAMUSCULAR; INTRAVENOUS at 13:37

## 2023-01-01 RX ADMIN — HYDROMORPHONE HYDROCHLORIDE 0.5 MG: 1 INJECTION, SOLUTION INTRAMUSCULAR; INTRAVENOUS; SUBCUTANEOUS at 16:29

## 2023-01-01 RX ADMIN — DEXAMETHASONE SODIUM PHOSPHATE 10 MG: 4 INJECTION, SOLUTION INTRA-ARTICULAR; INTRALESIONAL; INTRAMUSCULAR; INTRAVENOUS; SOFT TISSUE at 12:00

## 2023-01-01 RX ADMIN — Medication 50 MG: at 14:57

## 2023-01-01 RX ADMIN — LIDOCAINE HYDROCHLORIDE 80 MG: 20 INJECTION, SOLUTION INFILTRATION; PERINEURAL at 11:30

## 2023-01-01 RX ADMIN — ACETAMINOPHEN 975 MG: 325 TABLET, FILM COATED ORAL at 13:27

## 2023-01-01 RX ADMIN — SODIUM CHLORIDE, POTASSIUM CHLORIDE, SODIUM LACTATE AND CALCIUM CHLORIDE: 600; 310; 30; 20 INJECTION, SOLUTION INTRAVENOUS at 13:58

## 2023-01-01 RX ADMIN — SODIUM CHLORIDE 1000 ML: 9 INJECTION, SOLUTION INTRAVENOUS at 21:47

## 2023-01-01 RX ADMIN — ONDANSETRON 4 MG: 2 INJECTION INTRAMUSCULAR; INTRAVENOUS at 11:19

## 2023-01-01 RX ADMIN — Medication 40 MG: at 11:38

## 2023-01-01 RX ADMIN — PROCHLORPERAZINE EDISYLATE 5 MG: 5 INJECTION INTRAMUSCULAR; INTRAVENOUS at 16:35

## 2023-01-01 RX ADMIN — FAMOTIDINE 20 MG: 20 TABLET, FILM COATED ORAL at 20:08

## 2023-01-01 RX ADMIN — Medication 2 G: at 14:48

## 2023-01-01 RX ADMIN — SODIUM CHLORIDE, PRESERVATIVE FREE: 5 INJECTION INTRAVENOUS at 19:46

## 2023-01-01 RX ADMIN — SODIUM CHLORIDE, PRESERVATIVE FREE: 5 INJECTION INTRAVENOUS at 04:02

## 2023-01-01 RX ADMIN — Medication 100 MG: at 14:57

## 2023-01-01 RX ADMIN — Medication 10 MG: at 12:19

## 2023-01-01 RX ADMIN — SODIUM CHLORIDE, POTASSIUM CHLORIDE, SODIUM LACTATE AND CALCIUM CHLORIDE: 600; 310; 30; 20 INJECTION, SOLUTION INTRAVENOUS at 15:56

## 2023-01-01 RX ADMIN — SODIUM CHLORIDE, PRESERVATIVE FREE: 5 INJECTION INTRAVENOUS at 00:14

## 2023-01-01 RX ADMIN — CEFTRIAXONE SODIUM 1 G: 1 INJECTION, SOLUTION INTRAVENOUS at 08:54

## 2023-01-01 RX ADMIN — MIDAZOLAM 2 MG: 1 INJECTION INTRAMUSCULAR; INTRAVENOUS at 13:45

## 2023-01-01 RX ADMIN — ONDANSETRON 4 MG: 2 INJECTION INTRAMUSCULAR; INTRAVENOUS at 12:49

## 2023-01-01 RX ADMIN — SODIUM CHLORIDE, PRESERVATIVE FREE: 5 INJECTION INTRAVENOUS at 14:14

## 2023-01-01 RX ADMIN — PHENYLEPHRINE HYDROCHLORIDE 200 MCG: 10 INJECTION INTRAVENOUS at 15:12

## 2023-01-01 RX ADMIN — METOPROLOL SUCCINATE 12.5 MG: 25 TABLET, EXTENDED RELEASE ORAL at 08:01

## 2023-01-01 RX ADMIN — HYDROMORPHONE HYDROCHLORIDE 0.2 MG: 0.2 INJECTION, SOLUTION INTRAMUSCULAR; INTRAVENOUS; SUBCUTANEOUS at 08:43

## 2023-01-01 RX ADMIN — Medication 1 MG: at 23:55

## 2023-01-01 RX ADMIN — PROCHLORPERAZINE EDISYLATE 5 MG: 5 INJECTION INTRAMUSCULAR; INTRAVENOUS at 21:01

## 2023-01-01 RX ADMIN — METOPROLOL SUCCINATE 12.5 MG: 25 TABLET, EXTENDED RELEASE ORAL at 09:40

## 2023-01-01 RX ADMIN — SODIUM CHLORIDE, PRESERVATIVE FREE: 5 INJECTION INTRAVENOUS at 12:15

## 2023-01-01 RX ADMIN — IOPAMIDOL 80 ML: 755 INJECTION, SOLUTION INTRAVENOUS at 21:13

## 2023-01-01 RX ADMIN — ROCURONIUM BROMIDE 45 MG: 10 INJECTION INTRAVENOUS at 11:37

## 2023-01-01 RX ADMIN — PROPOFOL 130 MG: 10 INJECTION, EMULSION INTRAVENOUS at 11:34

## 2023-01-01 RX ADMIN — FENTANYL CITRATE 50 MCG: 50 INJECTION INTRAMUSCULAR; INTRAVENOUS at 12:44

## 2023-01-01 RX ADMIN — HYDROMORPHONE HYDROCHLORIDE 0.5 MG: 1 INJECTION, SOLUTION INTRAMUSCULAR; INTRAVENOUS; SUBCUTANEOUS at 00:46

## 2023-01-01 RX ADMIN — FENTANYL CITRATE 50 MCG: 50 INJECTION, SOLUTION INTRAMUSCULAR; INTRAVENOUS at 11:30

## 2023-01-01 RX ADMIN — ONDANSETRON 4 MG: 2 INJECTION INTRAMUSCULAR; INTRAVENOUS at 19:27

## 2023-01-01 RX ADMIN — SODIUM CHLORIDE, PRESERVATIVE FREE: 5 INJECTION INTRAVENOUS at 16:38

## 2023-01-01 RX ADMIN — SODIUM CHLORIDE, PRESERVATIVE FREE: 5 INJECTION INTRAVENOUS at 05:48

## 2023-01-01 RX ADMIN — FENTANYL CITRATE 50 MCG: 50 INJECTION INTRAMUSCULAR; INTRAVENOUS at 13:02

## 2023-01-01 RX ADMIN — METOPROLOL SUCCINATE 12.5 MG: 25 TABLET, EXTENDED RELEASE ORAL at 12:48

## 2023-01-01 ASSESSMENT — ACTIVITIES OF DAILY LIVING (ADL)
ADLS_ACUITY_SCORE: 33
ADLS_ACUITY_SCORE: 20
ADLS_ACUITY_SCORE: 33
ADLS_ACUITY_SCORE: 26
ADLS_ACUITY_SCORE: 22
ADLS_ACUITY_SCORE: 33
ADLS_ACUITY_SCORE: 26
ADLS_ACUITY_SCORE: 33
ADLS_ACUITY_SCORE: 33
ADLS_ACUITY_SCORE: 26
ADLS_ACUITY_SCORE: 33
ADLS_ACUITY_SCORE: 26
ADLS_ACUITY_SCORE: 33
ADLS_ACUITY_SCORE: 33
ADLS_ACUITY_SCORE: 26
ADLS_ACUITY_SCORE: 33
ADLS_ACUITY_SCORE: 35
ADLS_ACUITY_SCORE: 35
ADLS_ACUITY_SCORE: 33
ADLS_ACUITY_SCORE: 22
ADLS_ACUITY_SCORE: 33
ADLS_ACUITY_SCORE: 26
ADLS_ACUITY_SCORE: 33
ADLS_ACUITY_SCORE: 26
ADLS_ACUITY_SCORE: 33
ADLS_ACUITY_SCORE: 26
ADLS_ACUITY_SCORE: 33
ADLS_ACUITY_SCORE: 33
ADLS_ACUITY_SCORE: 26
ADLS_ACUITY_SCORE: 22
ADLS_ACUITY_SCORE: 33
ADLS_ACUITY_SCORE: 26
ADLS_ACUITY_SCORE: 26
ADLS_ACUITY_SCORE: 33
ADLS_ACUITY_SCORE: 26
ADLS_ACUITY_SCORE: 26
ADLS_ACUITY_SCORE: 33
ADLS_ACUITY_SCORE: 26
ADLS_ACUITY_SCORE: 26
ADLS_ACUITY_SCORE: 33
ADLS_ACUITY_SCORE: 33
ADLS_ACUITY_SCORE: 24
ADLS_ACUITY_SCORE: 33
ADLS_ACUITY_SCORE: 22
ADLS_ACUITY_SCORE: 26
ADLS_ACUITY_SCORE: 26
ADLS_ACUITY_SCORE: 33
ADLS_ACUITY_SCORE: 20
ADLS_ACUITY_SCORE: 26
ADLS_ACUITY_SCORE: 26
ADLS_ACUITY_SCORE: 20
ADLS_ACUITY_SCORE: 33
ADLS_ACUITY_SCORE: 26
ADLS_ACUITY_SCORE: 22
ADLS_ACUITY_SCORE: 26
ADLS_ACUITY_SCORE: 33
ADLS_ACUITY_SCORE: 33
ADLS_ACUITY_SCORE: 26
ADLS_ACUITY_SCORE: 33
ADLS_ACUITY_SCORE: 26
ADLS_ACUITY_SCORE: 26
ADLS_ACUITY_SCORE: 33
ADLS_ACUITY_SCORE: 26
ADLS_ACUITY_SCORE: 26
ADLS_ACUITY_SCORE: 24
ADLS_ACUITY_SCORE: 26
ADLS_ACUITY_SCORE: 26
ADLS_ACUITY_SCORE: 33
ADLS_ACUITY_SCORE: 26
ADLS_ACUITY_SCORE: 22
ADLS_ACUITY_SCORE: 26
ADLS_ACUITY_SCORE: 33
ADLS_ACUITY_SCORE: 35
ADLS_ACUITY_SCORE: 35
ADLS_ACUITY_SCORE: 26
ADLS_ACUITY_SCORE: 35
ADLS_ACUITY_SCORE: 22
ADLS_ACUITY_SCORE: 33
ADLS_ACUITY_SCORE: 20
ADLS_ACUITY_SCORE: 26
ADLS_ACUITY_SCORE: 26
ADLS_ACUITY_SCORE: 33
ADLS_ACUITY_SCORE: 35
ADLS_ACUITY_SCORE: 26
ADLS_ACUITY_SCORE: 26
ADLS_ACUITY_SCORE: 33
ADLS_ACUITY_SCORE: 33
ADLS_ACUITY_SCORE: 35
ADLS_ACUITY_SCORE: 35
ADLS_ACUITY_SCORE: 26
ADLS_ACUITY_SCORE: 33
ADLS_ACUITY_SCORE: 26
ADLS_ACUITY_SCORE: 33
ADLS_ACUITY_SCORE: 35
ADLS_ACUITY_SCORE: 33
ADLS_ACUITY_SCORE: 33
ADLS_ACUITY_SCORE: 20
ADLS_ACUITY_SCORE: 26
ADLS_ACUITY_SCORE: 22
ADLS_ACUITY_SCORE: 26
ADLS_ACUITY_SCORE: 33
ADLS_ACUITY_SCORE: 33
ADLS_ACUITY_SCORE: 26
ADLS_ACUITY_SCORE: 33
ADLS_ACUITY_SCORE: 26
ADLS_ACUITY_SCORE: 33
ADLS_ACUITY_SCORE: 22
ADLS_ACUITY_SCORE: 26
ADLS_ACUITY_SCORE: 24

## 2023-01-01 ASSESSMENT — LIFESTYLE VARIABLES
TOBACCO_USE: 1

## 2023-01-01 ASSESSMENT — ENCOUNTER SYMPTOMS
SHORTNESS OF BREATH: 0
CONSTIPATION: 1
NAUSEA: 1
APPETITE CHANGE: 1
DIARRHEA: 0
FEVER: 0
PALPITATIONS: 0
UNEXPECTED WEIGHT CHANGE: 1
DIAPHORESIS: 0
COUGH: 0
ABDOMINAL PAIN: 0
VOMITING: 0
CHILLS: 0

## 2023-01-01 ASSESSMENT — PAIN SCALES - GENERAL
PAINLEVEL: SEVERE PAIN (6)
PAINLEVEL: SEVERE PAIN (6)
PAINLEVEL: MODERATE PAIN (5)
PAINLEVEL: SEVERE PAIN (7)
PAINLEVEL: SEVERE PAIN (6)
PAINLEVEL: SEVERE PAIN (6)

## 2023-01-01 ASSESSMENT — PATIENT HEALTH QUESTIONNAIRE - PHQ9: SUM OF ALL RESPONSES TO PHQ QUESTIONS 1-9: 7

## 2023-06-25 NOTE — ED TRIAGE NOTES
Pt reports that he hasn't had a BM in 13 days. Pt reports that he has tried ex-lax, suppositories, and 5 fleets enemas at home with no relief. Pt reports he hasn't been eating much, but he's been drinking water and juice.

## 2023-06-26 NOTE — ED PROVIDER NOTES
History     Chief Complaint   Patient presents with     Constipation     HPI  Nash De Leon is a 72 year old male who since with 13 days of constipation and roughly 12 pounds weight loss in the last 2 weeks.  He has no appetite.  Been 10 years since he had a colonoscopy.  No bloody stools.  He has tried over-the-counter laxatives and enemas and he is not having a lot of relief, he does have some abdominal discomfort.    Allergies:  No Known Allergies    Problem List:    There are no problems to display for this patient.       Past Medical History:    Past Medical History:   Diagnosis Date     Antiplatelet or antithrombotic long-term use      History of BPH      Meniere's disease      Presence of stent in coronary artery in patient with coronary artery disease      Stented coronary artery        Past Surgical History:    Past Surgical History:   Procedure Laterality Date     CARDIAC SURGERY      Stents     COLONOSCOPY       COMBINED CYSTOSCOPY, RETROGRADES, URETEROSCOPY, LASER HOLMIUM LITHOTRIPSY URETER(S), INSERT STENT Left 2018    Procedure: COMBINED CYSTOSCOPY, RETROGRADES, URETEROSCOPY, LASER HOLMIUM LITHOTRIPSY URETER(S), INSERT STENT;  Cystoscopy, Left Ueteroscopy with Holmium Laser Lithotripsy, Left Stent Placement;  Surgeon: Terrell Forman MD;  Location: [facility]     COMBINED CYSTOSCOPY, RETROGRADES, URETEROSCOPY, LASER HOLMIUM LITHOTRIPSY URETER(S), INSERT STENT Left 2018    Procedure: COMBINED CYSTOSCOPY, RETROGRADES, URETEROSCOPY, LASER HOLMIUM LITHOTRIPSY URETER(S), INSERT STENT;  Cystoscopy, Left Ureteroscopy with Stone Basketing, Left Stent Exchange ;  Surgeon: Terrell Forman MD;  Location: [facility]       Family History:    No family history on file.    Social History:  Marital Status:   [2]  Social History     Tobacco Use     Smoking status: Former     Types: Cigarettes     Quit date: 1998     Years since quittin.9     Smokeless tobacco: Never   Substance Use  "Topics     Alcohol use: No     Drug use: No        Medications:    aspirin 81 MG tablet  atorvastatin (LIPITOR) 80 MG tablet  clopidogrel (PLAVIX) 75 MG tablet  lactulose (CHRONULAC) 10 GM/15ML solution  metoprolol succinate (TOPROL-XL) 25 MG 24 hr tablet  nitroGLYcerin (NITROSTAT) 0.4 MG sublingual tablet  oxybutynin (DITROPAN XL) 5 MG 24 hr tablet  oxyCODONE IR (ROXICODONE) 5 MG tablet          Review of Systems   Constitutional: Positive for appetite change and unexpected weight change. Negative for chills, diaphoresis and fever.   Respiratory: Negative for cough and shortness of breath.    Cardiovascular: Negative for chest pain and palpitations.   Gastrointestinal: Positive for constipation and nausea. Negative for abdominal pain, diarrhea and vomiting.   All other systems reviewed and are negative.      Physical Exam   BP: 136/85  Pulse: 70  Temp: 97.8  F (36.6  C)  Resp: 16  Height: 170.2 cm (5' 7\")  Weight: 67.8 kg (149 lb 7.6 oz)  SpO2: 96 %      Physical Exam  Vitals and nursing note reviewed.   Constitutional:       Appearance: Normal appearance.   HENT:      Head: Normocephalic and atraumatic.   Cardiovascular:      Rate and Rhythm: Normal rate and regular rhythm.      Heart sounds: Normal heart sounds. No murmur heard.     No friction rub. No gallop.   Pulmonary:      Effort: Pulmonary effort is normal.      Breath sounds: Normal breath sounds. No wheezing, rhonchi or rales.   Abdominal:      Palpations: Abdomen is soft.      Tenderness: There is no abdominal tenderness. There is no guarding or rebound.   Skin:     General: Skin is warm.   Neurological:      General: No focal deficit present.      Mental Status: He is alert.   Psychiatric:         Mood and Affect: Mood normal.         Behavior: Behavior normal.         ED Course                 Results for orders placed or performed during the hospital encounter of 06/25/23 (from the past 24 hour(s))   XR Abdomen 2 Views    Narrative    XR ABDOMEN 2 " VIEWS   6/25/2023 5:17 PM    History:Male,age  72 years, constipation x 13 days    Comparison: 9/13/2018    FINDINGS: Two views are submitted. Moderate volume of stool is seen  within the colon. There is no evidence of free air or evidence of  obstruction.      Impression    IMPRESSION:  Moderate volume of stool. No evidence of obstruction. No apparent free  air.    JI NAZARIO MD         SYSTEM ID:  [id number]   CBC with platelets differential    Narrative    The following orders were created for panel order CBC with platelets differential.  Procedure                               Abnormality         Status                     ---------                               -----------         ------                     CBC with platelets and d...[[id number]]  Abnormal            Final result                 Please view results for these tests on the individual orders.   Comprehensive metabolic panel   Result Value Ref Range    Sodium 133 (L) 136 - 145 mmol/L    Potassium 4.2 3.4 - 5.3 mmol/L    Chloride 96 (L) 98 - 107 mmol/L    Carbon Dioxide (CO2) 25 22 - 29 mmol/L    Anion Gap 12 7 - 15 mmol/L    Urea Nitrogen 16.7 8.0 - 23.0 mg/dL    Creatinine 1.04 0.67 - 1.17 mg/dL    Calcium 9.5 8.8 - 10.2 mg/dL    Glucose 96 70 - 99 mg/dL    Alkaline Phosphatase 63 40 - 129 U/L    AST 18 0 - 45 U/L    ALT 9 0 - 70 U/L    Protein Total 7.1 6.4 - 8.3 g/dL    Albumin 4.0 3.5 - 5.2 g/dL    Bilirubin Total 0.5 <=1.2 mg/dL    GFR Estimate 75 >60 mL/min/1.73m2   Lipase   Result Value Ref Range    Lipase 56 13 - 60 U/L   CBC with platelets and differential   Result Value Ref Range    WBC Count 11.1 (H) 4.0 - 11.0 10e3/uL    RBC Count 5.47 4.40 - 5.90 10e6/uL    Hemoglobin 16.7 13.3 - 17.7 g/dL    Hematocrit 49.2 40.0 - 53.0 %    MCV 90 78 - 100 fL    MCH 30.5 26.5 - 33.0 pg    MCHC 33.9 31.5 - 36.5 g/dL    RDW 12.4 10.0 - 15.0 %    Platelet Count 237 150 - 450 10e3/uL    % Neutrophils 66 %    % Lymphocytes 16 %    % Monocytes 14 %     % Eosinophils 3 %    % Basophils 1 %    % Immature Granulocytes 0 %    NRBCs per 100 WBC 0 <1 /100    Absolute Neutrophils 7.3 1.6 - 8.3 10e3/uL    Absolute Lymphocytes 1.8 0.8 - 5.3 10e3/uL    Absolute Monocytes 1.6 (H) 0.0 - 1.3 10e3/uL    Absolute Eosinophils 0.3 0.0 - 0.7 10e3/uL    Absolute Basophils 0.1 0.0 - 0.2 10e3/uL    Absolute Immature Granulocytes 0.0 <=0.4 10e3/uL    Absolute NRBCs 0.0 10e3/uL   CT Abdomen Pelvis w Contrast    Narrative    CT ABDOMEN PELVIS W CONTRAST    CLINICAL HISTORY: Male, age 72 years,  abd pain, constipation x 13  days, weight loss;    Comparison:  CT scan abdomen and pelvis 11/20/2018    TECHNIQUE:  CT Scanwas performed of the abdomen and pelvis with IV  contrast. Axial; sagittal and coronal MIP images were reviewed.    FINDINGS:  The lung bases and visualized portions of the heart demonstrate no  acute abnormality. Areas of atelectasis are again seen within the lung  bases.    Stomach and duodenum: Small hiatal hernia is unchanged. There is no  acute abnormality.    Liver: Liver now is small in size and demonstrates some of the scarred  appearance with areas of decreased density in the right lobe.    Gallbladder: Recurrence gallstone. Spleen: Unremarkable.    Pancreas: Unremarkable.    Adrenal glands: Unremarkable    Kidneys: Unremarkable.    Ureters: Unremarkable.    Urinary bladder: Unremarkable.    Large and small bowel: Diverticulosis of the colon. No diverticulitis.  There is an area of circumferential wall thickening measuring  approximately 2.6 cm in longitudinal length within the midportion of  the transverse colon.    There is rotation of the vessels about the mesentery without evidence  of acute vascular compromise. Small bowel loops demonstrate no acute  inflammatory change.    Appendix: Unremarkable.    The abdominal aorta and inferior vena cava demonstrate no acute  abnormality. Retroperitoneal and mesenteric lymph nodes are  unremarkable.    Small fat-containing  umbilical hernia in appearance without acute  complication.    Bony structures: No acute abnormality.    Inguinal lymph nodes are normal.      Impression    IMPRESSION:   Focal area of circumferential wall thickening within the transverse  colon, concerning for neoplasm and/or localized inflammation. There is  a small diverticulum is seen in this location.    New low-density hepatic lesions lesions. In light of the previously  mentioned finding raises concern for the possibility of metastatic  disease. Alternatively, these changes may represent a number of other  possibilities ranging from focal fatty infiltration to dense benign  versus malignant lesions.     Radiodense gallstone. No CT evidence of acute cholecystitis.    Diverticulosis of the colon.      This facility minimizes radiation dose by adjusting the mA and/or kV  according to each patient size.      This CT scan was performed using one or more the following dose  reduction techniques:    -Automated exposure control,  -Adjustment of the mA and/or kV according to patient's size, and/or,  -Use of iterative reconstruction technique.    JI NAZARIO MD         SYSTEM ID:  [id number]       Medications   sodium chloride 0.9% infusion (0 mLs Intravenous Stopped 6/25/23 2012)   sodium chloride (PF) 0.9% PF flush 50 mL (50 mLs Intravenous $Given 6/25/23 1903)   iopamidol (ISOVUE-370) solution 74 mL (74 mLs Intravenous $Given 6/25/23 1902)       Assessments & Plan (with Medical Decision Making)     I have reviewed the nursing notes.    I have reviewed the findings, diagnosis, plan and need for follow up with the patient.  Patient's x-ray showed quite a bit of air in the colon, I decided to do a CT scan which showed some transverse colon wall thickening with possible mets to the liver.  Its been 10 years since his last colonoscopy, he has had some weight loss recently.  We will plan to have him follow-up with primary care early this week and set up with a  colonoscopy.  He is not obstructed.  I did prescribe some lactulose to try to help with the constipation, he will take some MiraLAX as well.  I have asked him to come back if he gets complete obstruction or bad abdominal pain.  All questions were answered.        Discharge Medication List as of 6/25/2023  8:13 PM      START taking these medications    Details   lactulose (CHRONULAC) 10 GM/15ML solution Take 15 mLs (10 g) by mouth 2 times daily, Disp-473 mL, R-0, E-Prescribe             Final diagnoses:   Colon wall thickening   Liver lesion   Constipation, unspecified constipation type       6/25/2023   [facility]     Leonardo Hansen PA-C  06/25/23 2023

## 2023-06-26 NOTE — TELEPHONE ENCOUNTER
Care Transitions focused note:      PCP establish care set up with Dr Lewis on Wednesday 06/28/23 at 7:45am    Voicemail message left with appointment information and my contact information.    GERMAN Whiting

## 2023-06-26 NOTE — ED NOTES
Patient was given a copy of the CT report and a referral was sent for him to establish primary care.  Prescription sent to the pharmacy.  Patient discharged with his wife.

## 2023-06-26 NOTE — DISCHARGE INSTRUCTIONS
As we discussed call tomorrow to get a regular clinic appointment in the next few days.  We have sent a message to the  to try to help facilitate all you are going to meet soon.  I have sent a prescription for lactulose to the drugstore which you can take tomorrow, would also recommend some MiraLAX to help keep your stools soft.  At your clinic visit you should get an order for a colonoscopy.  If you have worsening pain or inability to pass gas or stool come back.

## 2023-06-28 NOTE — PROGRESS NOTES
Assessment & Plan   Problem List Items Addressed This Visit    None  Visit Diagnoses     Coronary artery disease involving native coronary artery of native heart without angina pectoris    -  Primary    Relevant Medications    clopidogrel (PLAVIX) 75 MG tablet- was discontinued by patient in 2018 per his verbal report to staff.    Colon wall thickening  Surgical consultation with likely colonoscopy order placed earlier today.               Review of prior external note(s) from - Outside records from [facility]  50 minutes spent by me on the date of the encounter doing chart review, review of outside records, review of test results, interpretation of tests, patient visit, documentation and discussion with family          Sami Lewis,   [facility]    Alicia Cao is a 72 year old, presenting for the following health issues:  Establish Care         No data to display              HPI     Establish Care- ED Follow up     Nash presents today for ER follow up and to establish care.  He has a history of Meniere's disease and CAD s/p two stents years ago.  He present to the ER recently due to constipation and was found to have concerning areas of thickening in his colon and concerning areas within his liver.  He denies any over abdominal pain today.  He states he was constipated for 12 days prior to the ER visit.  He was given Lactulose in the ER but has not had to use it much.  In reviewing his chart he did have a CT abd/pelvis a year ago secondary to painless jaundice and was thought to have Cirrhosis ans cholelithiasis but no areas of colonic thickening were mentioned.  In further review he did have a colonoscopy at [facility] in 2018 with pedunculated hemorrhagic polyps at 60 and 20 cm.  It appears these were tattooed with initial plan to repeat colonoscopy in 1 year.  Mr. De Leon was not aware of this follow up plan nor was his wife.    He currently appears stable from a  cardiac standpoint and denies any chest pain or SOB.     Vascular Disease Follow-up      How often do you take nitroglycerin? Never    Do you take an aspirin every day? Yes- 81 mg daily     ED/UC Followup:    Facility:  [facility]  Date of visit: 6/25/23  Reason for visit: Colon Wall Thickening   Current Status: BM - last BM on 6/28/23- small- formed;   Other concerns: pain in hands and bottom of feet - constant aching           Review of Systems   Constitutional, HEENT, cardiovascular, pulmonary, GI, , musculoskeletal, neuro, skin, endocrine and psych systems are negative, except as otherwise noted.      Objective    /64 (BP Location: Right arm, Patient Position: Sitting, Cuff Size: Adult Regular)   Pulse 73   Temp 97.9  F (36.6  C) (Tympanic)   Resp 20   Wt 68.5 kg (151 lb)   SpO2 97%   BMI 23.65 kg/m    Body mass index is 23.65 kg/m .  Physical Exam   GENERAL:  alert and no distress  EYES: Eyes grossly normal to inspection, PERRL and conjunctivae and sclerae normal  HENT: ear canals and TM's normal, nose and mouth without ulcers or lesions  RESP: lungs clear to auscultation - no rales, rhonchi or wheezes  CV: regular rate and rhythm, normal S1 S2, no S3 or S4, no murmur, click or rub, no peripheral edema and peripheral pulses strong  ABDOMEN: soft, nontender, no hepatosplenomegaly, no masses and bowel sounds normal  MS: no gross musculoskeletal defects noted, no edema  PSYCH: mentation appears normal, affect normal/bright    Admission on 06/25/2023, Discharged on 06/25/2023   Component Date Value Ref Range Status     Sodium 06/25/2023 133 (L)  136 - 145 mmol/L Final     Potassium 06/25/2023 4.2  3.4 - 5.3 mmol/L Final     Chloride 06/25/2023 96 (L)  98 - 107 mmol/L Final     Carbon Dioxide (CO2) 06/25/2023 25  22 - 29 mmol/L Final     Anion Gap 06/25/2023 12  7 - 15 mmol/L Final     Urea Nitrogen 06/25/2023 16.7  8.0 - 23.0 mg/dL Final     Creatinine 06/25/2023 1.04  0.67 - 1.17 mg/dL  Final     Calcium 06/25/2023 9.5  8.8 - 10.2 mg/dL Final     Glucose 06/25/2023 96  70 - 99 mg/dL Final     Alkaline Phosphatase 06/25/2023 63  40 - 129 U/L Final     AST 06/25/2023 18  0 - 45 U/L Final    Reference intervals for this test were updated on 6/12/2023 to more accurately reflect our healthy population. There may be differences in the flagging of prior results with similar values performed with this method. Interpretation of those prior results can be made in the context of the updated reference intervals.     ALT 06/25/2023 9  0 - 70 U/L Final    Reference intervals for this test were updated on 6/12/2023 to more accurately reflect our healthy population. There may be differences in the flagging of prior results with similar values performed with this method. Interpretation of those prior results can be made in the context of the updated reference intervals.       Protein Total 06/25/2023 7.1  6.4 - 8.3 g/dL Final     Albumin 06/25/2023 4.0  3.5 - 5.2 g/dL Final     Bilirubin Total 06/25/2023 0.5  <=1.2 mg/dL Final     GFR Estimate 06/25/2023 75  >60 mL/min/1.73m2 Final     Lipase 06/25/2023 56  13 - 60 U/L Final     WBC Count 06/25/2023 11.1 (H)  4.0 - 11.0 10e3/uL Final     RBC Count 06/25/2023 5.47  4.40 - 5.90 10e6/uL Final     Hemoglobin 06/25/2023 16.7  13.3 - 17.7 g/dL Final     Hematocrit 06/25/2023 49.2  40.0 - 53.0 % Final     MCV 06/25/2023 90  78 - 100 fL Final     MCH 06/25/2023 30.5  26.5 - 33.0 pg Final     MCHC 06/25/2023 33.9  31.5 - 36.5 g/dL Final     RDW 06/25/2023 12.4  10.0 - 15.0 % Final     Platelet Count 06/25/2023 237  150 - 450 10e3/uL Final     % Neutrophils 06/25/2023 66  % Final     % Lymphocytes 06/25/2023 16  % Final     % Monocytes 06/25/2023 14  % Final     % Eosinophils 06/25/2023 3  % Final     % Basophils 06/25/2023 1  % Final     % Immature Granulocytes 06/25/2023 0  % Final     NRBCs per 100 WBC 06/25/2023 0  <1 /100 Final     Absolute Neutrophils 06/25/2023 7.3   1.6 - 8.3 10e3/uL Final     Absolute Lymphocytes 06/25/2023 1.8  0.8 - 5.3 10e3/uL Final     Absolute Monocytes 06/25/2023 1.6 (H)  0.0 - 1.3 10e3/uL Final     Absolute Eosinophils 06/25/2023 0.3  0.0 - 0.7 10e3/uL Final     Absolute Basophils 06/25/2023 0.1  0.0 - 0.2 10e3/uL Final     Absolute Immature Granulocytes 06/25/2023 0.0  <=0.4 10e3/uL Final     Absolute NRBCs 06/25/2023 0.0  10e3/uL Final     No results found for any visits on 06/28/23.  No results found for this or any previous visit (from the past 24 hour(s)).                 EOAE (evoked otoacoustic emission)

## 2023-06-30 NOTE — TELEPHONE ENCOUNTER
3:51 PM    Reason for Call: Phone Call    Description: patient called and is wondering why a referral was not put in for a colonscopy at his appt on 06/26/23. Please call back, he is in a lot of pain    Was an appointment offered for this call? No  If yes : Appointment type              Date    Preferred method for responding to this message: Telephone Call  What is your phone number ?  [phone]    If we cannot reach you directly, may we leave a detailed response at the number you provided? Yes    Can this message wait until your PCP/provider returns, if available today? YES, No provider is not in today    GODFREY HENDERSON

## 2023-07-03 NOTE — TELEPHONE ENCOUNTER
I placed order on 6/28/23 and also discussed with surgery.  I put another referral in now today.  ^/28/23 referral is still in there.

## 2023-07-03 NOTE — TELEPHONE ENCOUNTER
1:07 PM    Reason for Call: Phone Call    Description: patient wants to know about about the referral for a colonoscopy, states he talked to you last Monday at this appointment.     Was an appointment offered for this call? No  If yes : Appointment type              Date    Preferred method for responding to this message: Telephone Call  What is your phone number ?  [phone]    If we cannot reach you directly, may we leave a detailed response at the number you provided? Yes    Can this message wait until your PCP/provider returns, if available today? {YES CAPS/NO provider is in today    GODFREY HENDERSON

## 2023-07-03 NOTE — TELEPHONE ENCOUNTER
I responded to this earlier in the morning, referral was placed on 6/28/23, not sure why he has not heard from them yet but should be a phone number on AVS to call if not heard in 2 business days.  I placed another referral this morning. Please direct this to surgery department as discussed this case with them.

## 2023-07-12 NOTE — LETTER
July 12, 2023      Cass De Leon  [address] [address]        To Whom It May Concern:    Please excuse Cass De Leon from work starting today 7/12/23-7/21/23.        Sincerely,        Zina Giraldo, NP

## 2023-07-12 NOTE — H&P (VIEW-ONLY)
CLINIC NOTE - CONSULT  7/12/2023    Patient:Nash De Leon    Referring Physician: Sami Lewis DO    Reason for Referral: colonic mass    This is a 72 year old male with a need of a colonoscopy for colon mass, abdominal pain, constipation, nausea.     Personal history of colon cancer : NO   Family history of colon cancer : precancerous polyps noted in patient's mother   Personal history of polyps : YES   Family history of polyps : YES   History of Inflammatory Bowel Disease : NO   History of rectal bleeding : NO   Changes in bowel habits : YES   Last colonoscopy : 2018    Past Medical History:  Past Medical History:   Diagnosis Date     Antiplatelet or antithrombotic long-term use      CAD (coronary artery disease)      History of BPH      Meniere's disease      Presence of stent in coronary artery in patient with coronary artery disease      Stented coronary artery        Past Surgical History:  Past Surgical History:   Procedure Laterality Date     CARDIAC SURGERY      Stents     COLONOSCOPY       COMBINED CYSTOSCOPY, RETROGRADES, URETEROSCOPY, LASER HOLMIUM LITHOTRIPSY URETER(S), INSERT STENT Left 8/23/2018    Procedure: COMBINED CYSTOSCOPY, RETROGRADES, URETEROSCOPY, LASER HOLMIUM LITHOTRIPSY URETER(S), INSERT STENT;  Cystoscopy, Left Ueteroscopy with Holmium Laser Lithotripsy, Left Stent Placement;  Surgeon: Terrell Forman MD;  Location: [facility] OR     COMBINED CYSTOSCOPY, RETROGRADES, URETEROSCOPY, LASER HOLMIUM LITHOTRIPSY URETER(S), INSERT STENT Left 9/13/2018    Procedure: COMBINED CYSTOSCOPY, RETROGRADES, URETEROSCOPY, LASER HOLMIUM LITHOTRIPSY URETER(S), INSERT STENT;  Cystoscopy, Left Ureteroscopy with Stone Basketing, Left Stent Exchange ;  Surgeon: Terrell Forman MD;  Location: [facility] OR       Family History History:  Family History   Problem Relation Age of Onset     Colon Cancer Mother      Alzheimer Disease Father        History of Tobacco Use:  History   Smoking Status     Former      "Types: Cigarettes     Quit date: 7/24/1998   Smokeless Tobacco     Never       Current Medications:  Current Outpatient Medications   Medication Sig Dispense Refill     aspirin 81 MG tablet Take 81 mg by mouth daily        bisacodyl (DULCOLAX) 5 MG EC tablet Take 2 tablets (10 mg) by mouth See Admin Instructions 2 Dulcolax tabs po hs 2 nights before surg, 2 dulcolax tabs po 3pm day before surg 4 tablet 0     metoprolol succinate ER (TOPROL XL) 25 MG 24 hr tablet 25 mg daily       polyethylene glycol (GOLYTELY) 236 g suspension Take 4,000 mLs by mouth once for 1 dose 2 Dulcolax tabs po hs 2 nights before surg, 2 dulcolax tabs po 3pm day before surg, 6pm day before surg 8 oz golytely every 10 min until jug is 1/2 gone refrigerate, 6 hrs before arrival golytely 8 oz q 10 min 4000 mL 0     lactulose (CHRONULAC) 10 GM/15ML solution Take 15 mLs (10 g) by mouth 2 times daily (Patient not taking: Reported on 7/12/2023) 473 mL 0     nitroGLYcerin (NITROSTAT) 0.4 MG sublingual tablet Place 0.4 mg under the tongue every 5 minutes as needed  (Patient not taking: Reported on 7/12/2023)         Allergies:  No Known Allergies    ROS:  Constitutional: positive for weight loss  Eyes: negative  Ears, nose, mouth, throat, and face: negative  Respiratory: negative  Cardiovascular: positive for history of stents  Gastrointestinal: positive for constipation, abdominal pain and history of polyps, family history of colon   Genitourinary:negative  Integument/breast: negative  Hematologic/lymphatic: negative  Musculoskeletal: positive for arm and leg pain, foot tingling  Neurological: positive for headaches  Behavioral/Psych: negative  Endocrine: negative  Allergic/Immunologic: negative    PHYSICAL EXAM:     Vital signs: /68 (BP Location: Right arm, Cuff Size: Adult Regular)   Pulse 68   Resp 16   Ht 1.626 m (5' 4\")   Wt 65.8 kg (145 lb)   SpO2 98%   BMI 24.89 kg/m     BMI: Body mass index is 24.89 kg/m .   General: " cooperative   Skin: no rashes   Lungs: clear to auscultation   CV: Regular rate and rhythm   Abdominal: distended   Extremities: No cyanosis, clubbing or edema noted bilaterally in Upper and Lower Extremities   Neurological: without deficit    ASSESSMENT:      72 year old male with a need of a colonoscopy for constipation, abdominal pain, colonic mass, history of colon polyps.    PLAN:   A colonoscopy will be scheduled and will be done tomorrow as patient is exhibiting obstructive symptoms.  The procedure with their risks, benefits and alternatives were explained.  Risks include but are not limited to bleeding, perforation, missing lesions, need for additional procedures, reaction to anesthesia.  All the patients questions were answered.  The patient consents to proceed as planned.

## 2023-07-13 NOTE — ANESTHESIA POSTPROCEDURE EVALUATION
Patient: Nash De Leon    Procedure: Procedure(s):  COLONOSCOPY WITH BIOPSY AND POLYPECTOMY       Anesthesia Type:  MAC    Note:  Disposition: Outpatient   Postop Pain Control: Uneventful            Sign Out: Well controlled pain   PONV: No   Neuro/Psych: Uneventful            Sign Out: Acceptable/Baseline neuro status   Airway/Respiratory: Uneventful            Sign Out: Acceptable/Baseline resp. status   CV/Hemodynamics: Uneventful            Sign Out: Acceptable CV status; No obvious hypovolemia; No obvious fluid overload   Other NRE: NONE   DID A NON-ROUTINE EVENT OCCUR? No           Last vitals:  Vitals Value Taken Time   /78 07/13/23 1500   Temp     Pulse 69 07/13/23 1500   Resp     SpO2 97 % 07/13/23 1505   Vitals shown include unvalidated device data.    Electronically Signed By: MARYAM Herrera CRNA  July 13, 2023  3:06 PM

## 2023-07-13 NOTE — OR NURSING
"Dr. Patel to speak with patient and wife at lengths.     New labs ordered.  10 ml wasted obtained followed by 2 ml blood draw; blood handed off to lab.  Patient tolerated well.  After labs drawn from IV, IV was removed.    Patient dressed by self and ambulated to the bathroom.  Gait steady.    Discharge instructions reviewed with patient and wife.  Both verbalize understanding and have no questions/concerns at this time.  Updated that PAT will call them in the AM to let them know \"surgery\" time for Monday morning.      Patient encouraged to return to clinic and/or ER if he develops severe abdominal pain, persistent NV, rectal bleeding, vomiting blood, fevers or feeling unwell.  Both patient and wife verbalize understanding.    "

## 2023-07-13 NOTE — DISCHARGE INSTRUCTIONS

## 2023-07-13 NOTE — ANESTHESIA CARE TRANSFER NOTE
Patient: Nash De Leon    Procedure: Procedure(s):  COLONOSCOPY       Diagnosis: Colonic obstruction (H) [K56.609]  Diagnosis Additional Information: No value filed.    Anesthesia Type:   MAC     Note:    Oropharynx: oropharynx clear of all foreign objects and spontaneously breathing  Level of Consciousness: drowsy  Oxygen Supplementation: room air    Independent Airway: airway patency satisfactory and stable  Dentition: dentition unchanged  Vital Signs Stable: post-procedure vital signs reviewed and stable  Report to RN Given: handoff report given  Patient transferred to: Phase II    Handoff Report: Identifed the Patient, Identified the Reponsible Provider, Reviewed the pertinent medical history, Discussed the surgical course, Reviewed Intra-OP anesthesia mangement and issues during anesthesia, Set expectations for post-procedure period and Allowed opportunity for questions and acknowledgement of understanding      Vitals:  Vitals Value Taken Time   BP     Temp     Pulse     Resp     SpO2         Electronically Signed By: MARYAM ANDERSON CRNA  July 13, 2023  2:43 PM

## 2023-07-13 NOTE — ANESTHESIA PREPROCEDURE EVALUATION
Anesthesia Pre-Procedure Evaluation    Patient: Nash De Leon   MRN: [id number] : 1950        Procedure : Procedure(s):  COLONOSCOPY          Past Medical History:   Diagnosis Date     Antiplatelet or antithrombotic long-term use      CAD (coronary artery disease)      History of BPH      Meniere's disease      Presence of stent in coronary artery in patient with coronary artery disease      Stented coronary artery       Past Surgical History:   Procedure Laterality Date     CARDIAC SURGERY      Stents     COLONOSCOPY       COMBINED CYSTOSCOPY, RETROGRADES, URETEROSCOPY, LASER HOLMIUM LITHOTRIPSY URETER(S), INSERT STENT Left 2018    Procedure: COMBINED CYSTOSCOPY, RETROGRADES, URETEROSCOPY, LASER HOLMIUM LITHOTRIPSY URETER(S), INSERT STENT;  Cystoscopy, Left Ueteroscopy with Holmium Laser Lithotripsy, Left Stent Placement;  Surgeon: Terrell Forman MD;  Location: [facility]     COMBINED CYSTOSCOPY, RETROGRADES, URETEROSCOPY, LASER HOLMIUM LITHOTRIPSY URETER(S), INSERT STENT Left 2018    Procedure: COMBINED CYSTOSCOPY, RETROGRADES, URETEROSCOPY, LASER HOLMIUM LITHOTRIPSY URETER(S), INSERT STENT;  Cystoscopy, Left Ureteroscopy with Stone Basketing, Left Stent Exchange ;  Surgeon: Terrell Forman MD;  Location: [facility] OR      No Known Allergies   Social History     Tobacco Use     Smoking status: Former     Types: Cigarettes     Quit date: 1998     Years since quittin.9     Smokeless tobacco: Never   Substance Use Topics     Alcohol use: No      Wt Readings from Last 1 Encounters:   23 65.8 kg (145 lb)        Anesthesia Evaluation   Pt has had prior anesthetic.     No history of anesthetic complications       ROS/MED HX  ENT/Pulmonary:     (+) tobacco use, Past use,     Neurologic:  - neg neurologic ROS     Cardiovascular:     (+) --CAD --stent-. 2 Taking blood thinners Pt has not received instructions:     METS/Exercise Tolerance: >4 METS    Hematologic:  - neg hematologic   ROS     Musculoskeletal:  - neg musculoskeletal ROS     GI/Hepatic: Comment: Spots on liver    (+) GERD, Asymptomatic on medication, bowel prep,     Renal/Genitourinary:     (+) Nephrolithiasis , BPH,     Endo:    (-) obesity   Psychiatric/Substance Use:  - neg psychiatric ROS     Infectious Disease:  - neg infectious disease ROS     Malignancy:  - neg malignancy ROS     Other:  - neg other ROS          Physical Exam    Airway        Mallampati: III   TM distance: > 3 FB   Neck ROM: full   Mouth opening: < 3 cm    Respiratory Devices and Support         Dental       (+) Modest Abnormalities - crowns, retainers, 1 or 2 missing teeth      Cardiovascular   cardiovascular exam normal       Rhythm and rate: regular and normal     Pulmonary   pulmonary exam normal        breath sounds clear to auscultation           OUTSIDE LABS:  CBC:   Lab Results   Component Value Date    WBC 11.1 (H) 06/25/2023    HGB 16.7 06/25/2023    HCT 49.2 06/25/2023     06/25/2023     BMP:   Lab Results   Component Value Date     (L) 06/25/2023    POTASSIUM 4.2 06/25/2023    POTASSIUM 4.1 05/19/2018    CHLORIDE 96 (L) 06/25/2023    CO2 25 06/25/2023    BUN 16.7 06/25/2023    CR 1.04 06/25/2023    CR 1.17 05/19/2018    GLC 96 06/25/2023     (H) 05/19/2018     COAGS: No results found for: PTT, INR, FIBR  POC:   Lab Results   Component Value Date    BGM 91 09/13/2018     HEPATIC:   Lab Results   Component Value Date    ALBUMIN 4.0 06/25/2023    PROTTOTAL 7.1 06/25/2023    ALT 9 06/25/2023    AST 18 06/25/2023    ALKPHOS 63 06/25/2023    BILITOTAL 0.5 06/25/2023     OTHER:   Lab Results   Component Value Date    A1C 5.7 12/18/2017    ABRAHAM 9.5 06/25/2023    LIPASE 56 06/25/2023       Anesthesia Plan    ASA Status:  3   NPO Status:  NPO Appropriate    Anesthesia Type: MAC.   Induction: Intravenous, Propofol.   Maintenance: Balanced.        Consents    Anesthesia Plan(s) and associated risks, benefits, and realistic alternatives  discussed. Questions answered and patient/representative(s) expressed understanding.     - Discussed: Risks, Benefits and Alternatives for BOTH SEDATION and the PROCEDURE were discussed     - Discussed with:  Patient      - Extended Intubation/Ventilatory Support Discussed: No.      - Patient is DNR/DNI Status: No    Use of blood products discussed: No .     Postoperative Care            Comments:                MARYAM Herrera CRNA

## 2023-07-13 NOTE — OR NURSING
Called patient to clarify Plavix medication mentioned in patient's chart on 06/28/23.  Patient states he has not been taking that medication since 2018.  [facility] nurse notified of medication correction.

## 2023-07-13 NOTE — INTERVAL H&P NOTE
I have reviewed the surgical (or preoperative) H&P that is linked to this encounter, and examined the patient. There are no significant changes    Clinical Conditions Present on Arrival:  Clinically Significant Risk Factors Present on Admission         # Hyponatremia: Lowest Na = 133 mmol/L in last 30 days, will monitor as appropriate         # Drug Induced Platelet Defect: home medication list includes an antiplatelet medication

## 2023-07-13 NOTE — OP NOTE
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   Hemorrhoids  were  identified.    SURGEON: Bruce Patel MD    ASSISTANTS: None    ANESTHESIA: Monitored Anesthesia Care    COMPLICATIONS: None apparent    TRANSFUSIONS: None     TISSUE TO PATHOLOGY: Polyps from Ascending colon were sent to pathology for pathological diagnosis.  Biopsies from transverse colon mass to pathology for pathologic diagnosis    FINDINGS: Colon polyps, Ascending colon and Colon mass, Transverse colon.  Diverticulosis was not identified.  Hemorrhoids  were  identified.    INDICATIONS: This is a 72 year old male in need of a colonoscopy for A probable colon cancer of the transverse colon.  The patient will be taken to the endoscopy suite for that procedure.    DESCRIPTIONS OF PROCEDURE IN DETAIL: After consent was obtained the patient was taken to the endoscopy suite and placed in the left lateral decubitus position.  The patient was identified and the correct patient was confirmed.  Monitored Anesthesia Care was given.  A time out was performed verifying the correct patient and the correct procedure.  The entire operative team was in agreement.  All necessary equipment and supplies were in the room.    Rectal exam was performed and no lesions of the anal canal were noted.  The colonoscope was inserted into the anus and passed without difficulty to the cecum.  The cecum was identified by the ileocecal valve, the coalescence of the tinea and the appendiceal orifice.  Upon withdrawal all walls of the colon were visualized.  Polyps were identified at Ascending colon.  These were removed by cold biopsy forceps.  A  colon mass was noted in the transverse colon.  Multiple biopsies were taken for both frozen and permanent pathology.  Tattooing of the lesion was done.  Colitis was not noted.  Diverticulosis was not seen.  Upon reaching the rectum the scope was retroflexed and internal hemorrhoids  were  seen.  The scope was straightened back out and removed from the patient.  The patient was then taken to the recovery room in stable condition tolerating the procedure well.      Prep: good    Withdrawal time was 8 minutes.

## 2023-07-14 NOTE — ANESTHESIA PREPROCEDURE EVALUATION
Anesthesia Pre-Procedure Evaluation    Patient: Nash De Leon   MRN: [id number] : 1950        Procedure : Procedure(s):  Extended HEMICOLECTOMY, RIGHT, OPEN          Past Medical History:   Diagnosis Date     Antiplatelet or antithrombotic long-term use      CAD (coronary artery disease)      History of BPH      Meniere's disease      Presence of stent in coronary artery in patient with coronary artery disease      Stented coronary artery       Past Surgical History:   Procedure Laterality Date     CARDIAC SURGERY      Stents     COLONOSCOPY       COLONOSCOPY N/A 2023    Procedure: COLONOSCOPY WITH BIOPSY AND POLYPECTOMY;  Surgeon: Bruce Patel MD;  Location: [facility]     COMBINED CYSTOSCOPY, RETROGRADES, URETEROSCOPY, LASER HOLMIUM LITHOTRIPSY URETER(S), INSERT STENT Left 2018    Procedure: COMBINED CYSTOSCOPY, RETROGRADES, URETEROSCOPY, LASER HOLMIUM LITHOTRIPSY URETER(S), INSERT STENT;  Cystoscopy, Left Ueteroscopy with Holmium Laser Lithotripsy, Left Stent Placement;  Surgeon: Terrell Forman MD;  Location: [facility]     COMBINED CYSTOSCOPY, RETROGRADES, URETEROSCOPY, LASER HOLMIUM LITHOTRIPSY URETER(S), INSERT STENT Left 2018    Procedure: COMBINED CYSTOSCOPY, RETROGRADES, URETEROSCOPY, LASER HOLMIUM LITHOTRIPSY URETER(S), INSERT STENT;  Cystoscopy, Left Ureteroscopy with Stone Basketing, Left Stent Exchange ;  Surgeon: Terrell Forman MD;  Location: [facility]      No Known Allergies   Social History     Tobacco Use     Smoking status: Former     Types: Cigarettes     Quit date: 1998     Years since quittin.9     Smokeless tobacco: Never   Substance Use Topics     Alcohol use: No      Wt Readings from Last 1 Encounters:   23 64.4 kg (142 lb)        Anesthesia Evaluation   Pt has had prior anesthetic. Type: MAC.    No history of anesthetic complications       ROS/MED HX  ENT/Pulmonary: Comment: J[place]    (+) PARMINDER risk factors, hypertension, tobacco use, Past  use,     Neurologic:     (+) peripheral neuropathy,     Cardiovascular:     (+) hypertension--CAD --stent-2019. 2 Taking blood thinners Pt has not received instructions:     METS/Exercise Tolerance: >4 METS    Hematologic:  - neg hematologic  ROS     Musculoskeletal:  - neg musculoskeletal ROS     GI/Hepatic: Comment: Spots on liver    (+) GERD, Asymptomatic on medication, bowel prep,     Renal/Genitourinary:     (+) Nephrolithiasis , BPH,     Endo:  - neg endo ROS     Psychiatric/Substance Use:       Infectious Disease:  - neg infectious disease ROS     Malignancy:  - neg malignancy ROS     Other:  - neg other ROS          Physical Exam    Airway        Mallampati: III   TM distance: > 3 FB   Neck ROM: full   Mouth opening: < 3 cm    Respiratory Devices and Support         Dental       (+) Minor Abnormalities - some fillings, tiny chips      Cardiovascular   cardiovascular exam normal          Pulmonary   pulmonary exam normal                OUTSIDE LABS:  CBC:   Lab Results   Component Value Date    WBC 11.1 (H) 06/25/2023    HGB 16.7 06/25/2023    HCT 49.2 06/25/2023     06/25/2023     BMP:   Lab Results   Component Value Date     (L) 06/25/2023    POTASSIUM 4.2 06/25/2023    POTASSIUM 4.1 05/19/2018    CHLORIDE 96 (L) 06/25/2023    CO2 25 06/25/2023    BUN 16.7 06/25/2023    CR 1.04 06/25/2023    CR 1.17 05/19/2018    GLC 96 06/25/2023     (H) 05/19/2018     COAGS: No results found for: PTT, INR, FIBR  POC:   Lab Results   Component Value Date    BGM 91 09/13/2018     HEPATIC:   Lab Results   Component Value Date    ALBUMIN 4.0 06/25/2023    PROTTOTAL 7.1 06/25/2023    ALT 9 06/25/2023    AST 18 06/25/2023    ALKPHOS 63 06/25/2023    BILITOTAL 0.5 06/25/2023     OTHER:   Lab Results   Component Value Date    A1C 5.7 12/18/2017    ABRAHAM 9.5 06/25/2023    LIPASE 56 06/25/2023       Anesthesia Plan    ASA Status:  3   NPO Status:  NPO Appropriate    Anesthesia Type: General.     - Airway: ETT    Induction: Intravenous, Propofol.   Maintenance: Balanced.        Consents    Anesthesia Plan(s) and associated risks, benefits, and realistic alternatives discussed. Questions answered and patient/representative(s) expressed understanding.     - Discussed: Risks, Benefits and Alternatives for BOTH SEDATION and the PROCEDURE were discussed     - Discussed with:  Patient      - Extended Intubation/Ventilatory Support Discussed: Yes.      - Patient is DNR/DNI Status: No    Use of blood products discussed: Yes.     - Discussed with: Patient.     - Consented: consented to blood products            Reason for refusal: other.     Postoperative Care    Pain management: Peripheral nerve block (Single Shot).   PONV prophylaxis: Ondansetron (or other 5HT-3), Dexamethasone or Solumedrol     Comments:    Other Comments: Discussed risks and benefits with patient for general anesthesia including sore throat, nausea, vomiting, aspiration, dental damage, loss of airway, CV complications, stroke, MI, death. Pt wishes to proceed. 7/12/23 Kristal for Colonoscopy on 7/13/23            MARYAM Ornelas CRNA

## 2023-07-17 PROBLEM — C18.9 COLON CANCER METASTASIZED TO LIVER (H): Status: ACTIVE | Noted: 2023-01-01

## 2023-07-17 PROBLEM — C78.7 COLON CANCER METASTASIZED TO LIVER (H): Status: ACTIVE | Noted: 2023-01-01

## 2023-07-17 NOTE — OR NURSING
Spoke with Francine ALLEN regarding pt not taking Lopressor since Friday. Fluids infusing. Per CRNA give 250cc NS. Bolus

## 2023-07-17 NOTE — OP NOTE
REPORT OF OPERATION  DATE OF PROCEDURE: 7/17/2023    PATIENT: Nash De Leon    SURGERY PERFORMED: Exploratory laparotomy, extended Right Hemicolectomy with takedown of splenic flexure, liver biopsy    PREOPERATIVE DIAGNOSIS: Colon cancer    POSTOPERATIVE DIAGNOSIS: Same, multiple lesions within the liver    SURGEON: Bruce Patel MD    ASSISTANTS: Zina Giraldo CNP, Her assistance was required because of the technical aspects of the case    ANESTHESIA: General Endotracheal Anesthesia    COMPLICATIONS: None apparent    TRANSFUSIONS: None    TISSUE TO PATHOLOGY: Right colon to Pathology for pathological diagnoses, or biopsy to pathology for pathological diagnosis    FINDINGS: Mass in the transverse colon.  No evidence of other lesions within the colon or within the mesentery.  Multiple lesions on both sides of the liver.    INDICATIONS: This is a 72 year old male with biopsy-proven colon cancer.  The patient will be taken to the operating room for an open extended right hemicolectomy right hemicolectomy    DESCRIPTIONS OF PROCEDURE IN DETAIL: After consent was obtained the patient was taken to the operative suite and dasha in the supine position.  The patient was identified and the correct patient was confirmed.  General Endotracheal Anesthesia was administered by anesthesia.  The patient was sterilely prepped and draped in the usual fashion.  A time out was performed verifying the correct patient and the correct procedure.  The entire operative team was in agreement.  All necessary equipment and supplies were in the room.     Through a midline incision, the skin was sharply entered, dissection was taken down to isolation of the fascia.  The fascia was opened and entrance into the abdomen was accomplished.  The bowels were packed out of the way giving access to the right side of the abdomen.  The right colon was mobilized along the right white line of Toldt.  The mobilization was continued from the cecum up  the right colic gutter to the hepatic flexure.  The hepatic flexure was then taken down.  The gastrocolic ligament of the transverse colon was then divided.  Any attachments of the distal ileum to the posterior peritoneum were also taken down giving full mobilization of the small bowel, right and transverse colon.  Points of resection were then determined.  A window was made around the distal ileum and the ileum was divided using a RENAY-75 stapler.  A window was made around the distal transverse colon (middle colic artery was not preserved) and the colon was divided using a RENAY-75 stapler.  The intervening mesentery was then taken down using the LigaSure.  Hemostasis was assured.  The specimen was removed, passed off the field, opened on the back table, and the lesion/area of interest was verified to be contained within the specimen.  The specimen was then sent to Pathology for pathologic diagnosis.  Hemostasis was assured.  The splenic flexure was then taken down.  A side-to-side functional end-to-end anastomosis was created, bringing the distal ileum to the colon using a RENAY-75 stapler.  The anastomosis was then closed with a TA-60 stapler.  The staple line was imbricated with Lembert stitches of 3-0 silk sutures.  The mesenteric rent was closed with running 3-0 Vicryl suture.  Hemostasis was assured.  The abdomen was irrigated and the irrigant was removed and again hemostasis was assured.  Palpation of the liver was then undertaken.  There are multiple lesions within both sides of the liver.  There was one along the edge and this was removed using Bovie electrocautery.  This was sent to pathology for pathological diagnosis.  Hemostasis was again assured.  The bowel was placed back into the abdomen.  All instrumentation was removed from the abdomen.  All instrument, needle, and sponge counts were correct x2.  The midline incisions' fascia was closed with looped #1 PDS and all skin incisions were closed using  stainless steel staples.  Sterile dressings were applied.  The patient was awakened in the operating room, extubated without difficulty, and taken to the recovery room in stable condition, tolerating the procedure well.

## 2023-07-17 NOTE — OR NURSING
Face to face report given with opportunity to observe patient.Patient denies pain and discomforts. Wife at bedside.     Report given to CARRINGTON Germain RN   7/17/2023  5:32 PM

## 2023-07-17 NOTE — ANESTHESIA POSTPROCEDURE EVALUATION
Patient: Nash De Leon    Procedure: Procedure(s):  Extended HEMICOLECTOMY, RIGHT, OPEN Exploratory Laparotony and Liver biopsy       Anesthesia Type:  General    Note:  Disposition: Inpatient   Postop Pain Control: Uneventful            Sign Out: Well controlled pain   PONV: No   Neuro/Psych: Uneventful            Sign Out: Acceptable/Baseline neuro status   Airway/Respiratory: Uneventful            Sign Out: Acceptable/Baseline resp. status   CV/Hemodynamics: Uneventful            Sign Out: Acceptable CV status; No obvious hypovolemia; No obvious fluid overload   Other NRE: NONE   DID A NON-ROUTINE EVENT OCCUR? No           Last vitals:  Vitals Value Taken Time   /81 07/17/23 1710   Temp 98.2  F (36.8  C) 07/17/23 1710   Pulse 91 07/17/23 1710   Resp 11 07/17/23 1710   SpO2 81 % 07/17/23 1711   Vitals shown include unvalidated device data.    Electronically Signed By: MARYAM Del Cid CRNA  July 17, 2023  6:07 PM

## 2023-07-17 NOTE — ANESTHESIA PROCEDURE NOTES
Airway       Patient location during procedure: OR       Procedure Start/Stop Times: 7/17/2023 2:58 PM and 7/17/2023 2:58 PM  Staff -        CRNA: Terrell Abbott APRN CRNA       Performed By: CRNA  Consent for Airway        Urgency: elective  Indications and Patient Condition       Indications for airway management: polo-procedural       Induction type:intravenous       Mask difficulty assessment: 1 - vent by mask    Final Airway Details       Final airway type: endotracheal airway       Successful airway: ETT - single and Oral  Endotracheal Airway Details        ETT size (mm): 7.5       Cuffed: yes       Successful intubation technique: video laryngoscopy       VL Blade Size: Neely 4       Grade View of Cords: 1       Adjucts: stylet       Position: Right       Measured from: lips       Secured at (cm): 20       Bite block used: None    Post intubation assessment        Placement verified by: capnometry, equal breath sounds and chest rise        Number of attempts at approach: 1       Secured with: plastic tape       Ease of procedure: easy       Dentition: Intact and Unchanged    Medication(s) Administered   Medication Administration Time: 7/17/2023 2:58 PM

## 2023-07-17 NOTE — OR NURSING
Pt reports vomiting this am. Nausea intermittent. Spoke with Tin ALLEN regarding order for Zofran

## 2023-07-17 NOTE — ANESTHESIA CARE TRANSFER NOTE
Patient: Nash De Leon    Procedure: Procedure(s):  Extended HEMICOLECTOMY, RIGHT, OPEN Exploratory Laparotony and Liver biopsy       Diagnosis: Malignant neoplasm of transverse colon (H) [C18.4]  Diagnosis Additional Information: No value filed.    Anesthesia Type:   General     Note:    Oropharynx: oral airway in place and spontaneously breathing  Level of Consciousness: drowsy      Independent Airway: airway patency satisfactory and stable        Patient transferred to: PACU    Handoff Report: Identifed the Patient, Identified the Reponsible Provider, Reviewed the pertinent medical history, Discussed the surgical course, Reviewed Intra-OP anesthesia mangement and issues during anesthesia, Set expectations for post-procedure period and Allowed opportunity for questions and acknowledgement of understanding      Vitals:  Vitals Value Taken Time   BP     Temp     Pulse     Resp     SpO2         Electronically Signed By: MARYAM Del Cid CRNA  July 17, 2023  4:34 PM

## 2023-07-18 NOTE — PLAN OF CARE
Goal Outcome Evaluation:      Plan of Care Reviewed With: patient    Patient appears to be improving. Patient is on 100ml per hour of lactated ringers. Chavez cath remains in place with marginal output. Reports pain at surgical site that he rates between a 1 and a 5. He reports improvement after medication. Patient blood glucose readings have been in the 140's to 170 and is not prescribed insulin. Patient's latest blood pressure reading was 122/80. Patient reports having had two watery bowel movements throughout the day and bowel sounds were hyperactive at last check. Patient has been able to ambulate with staff. Patient is tolerating a clear liquid diet and was able to advance to normal diet for dinner.

## 2023-07-18 NOTE — PROGRESS NOTES
Assessment completed by face to face with patient.    LOC: alert et oriented    Dx: Colon cancer metastasized to liver  Chronic Disease Management: S/P hemicholectomy,Open exploratory laparotomy, Coronary artery disease    Lives with: Spouse/ Cass  Living at:  Home in [place]  Transportation: YES , family    Primary PCP: Sami Lewis  Insurance:  BCBS Medicare Replacement    Support System:  Family  Homecare/PCA: No  /County Services:   No  : NO      How was the [facility] notification completed: N/A    Health Care Directive:  Not on File  Guardian: No  POA: No    Pharmacy: [facility]  Meds management: Independent    Adequate Resources for needs (housing, utilities, food/med): YES  Household chores: Shared w/spouse  Work/community/social activity: YES , family involved    ADLs: Independent  Ambulation:Independent  Falls: None in last six months  Nutrition: No concerns voiced  Sleep: No concerns voiced    Equipment used: None       Mental health: No concerns voiced  Substance abuse: No  Exposure to violence/abuse: No      Able to Return to Prior Living Arrangements: YES    Choice of Vendor: N/A    Barriers: None anticipated    MARVIN: Low    Plan: Return home via spouse/Cass

## 2023-07-18 NOTE — PLAN OF CARE
"Most recent vitals: /70 (BP Location: Left arm, Patient Position: Semi-Estevez's, Cuff Size: Adult Regular)   Pulse 69   Temp 98.2  F (36.8  C) (Tympanic)   Resp 18   Ht 1.626 m (5' 4\")   Wt 64.4 kg (142 lb)   SpO2 93%   BMI 24.37 kg/m      Pain Management:  Has complaints of abdominal pain rated 4/10, pt receiving scheduled tylenol  LOC:  A&O x4  Cardiac:  HR regular  Respiratory:  LS dim  GI:  Midline incision w/ dressing in place. Old drainage observed on dressing, dressing reinforced by previous shift nurse  :  Chavez catheter in place, 75 mL output of zoie/tea colored urine w/ sediment. Unable to complete accurate bladder scan due to surgical site incision/dressing.     IVF:  IV infusing LR at 100, IV X2     Ambulation: Ax1, Not oob this shift  Safety:  Call light within reach, needs made known.     Face to face report given with opportunity to observe patient.    Report given to Jasmyn Elder RN   7/18/2023  5:06 AM      "

## 2023-07-18 NOTE — MEDICATION SCRIBE - ADMISSION MEDICATION HISTORY
Medication Scribe Admission Medication History    Admission medication history is complete. The information provided in this note is only as accurate as the sources available at the time of the update.    Medication reconciliation/reorder completed by provider prior to medication history? Yes    Information Source(s): Patient and CareEverywhere/SureScripts via in-person    Pertinent Information:     Patient manages his own medications and is a good historian.     Denies taking any OTC vitamins, supplements or analgesics.     Changes made to PTA medication list:    Added: None    Deleted: erythromycin, neomycin, miralax- surgical prep medications (therapy completed- pt reports took as prescribed), lactulose- prescribed for constipation, pt reports ineffective- stopped taking and used prune juice for relief.     Changed: metoprolol from 25 mg daily to 12.5 mg daily (pt confirmed home dose)    Medication Affordability:  Not including over the counter (OTC) medications, was there a time in the past 3 months when you did not take your medications as prescribed because of cost?: No    Allergies reviewed with patient and updates made in EHR: yes- NKDA    Medication History Completed By: Jessika Sanchez 7/18/2023 8:02 AM    Prior to Admission medications    Medication Sig Last Dose Taking? Auth Provider Long Term End Date   aspirin 81 MG tablet Take 81 mg by mouth every morning Past Week Yes Reported, Patient     metoprolol succinate ER (TOPROL XL) 25 MG 24 hr tablet Take 12.5 mg by mouth every morning 7/17/2023 at AM Yes Reported, Patient No    nitroGLYcerin (NITROSTAT) 0.4 MG sublingual tablet Place 0.4 mg under the tongue every 5 minutes as needed for Chest pain. Do not crush; maximum of 3 doses in 15 minutes. Unknown Yes Reported, Patient Yes

## 2023-07-18 NOTE — PLAN OF CARE
Pt up at edge of bed to dangle. Noticed red blood drainage on gown. Blood coming from under aqua-mirtha dressing. Dressing re-inforced,  Dr Patel updated.

## 2023-07-18 NOTE — CONSULTS
[facility]  Consult Note - Hospitalist Service  Date of Admission:  7/17/2023  Consult Requested by: Dr. Patel  Reason for Consult: Management of chronic medical history including chronic kidney disease    Assessment & Plan   Nash De Leon is a 72 year old male admitted on 7/17/2023 with colon cancer    #Colon cancer  - 72 year old male with established diagnosis of coLON cancer,   who was brought to the hospital to undergo hemicolectomy due to colon cancer.   - Surgery was uneventful  -Management per general surgery    #Coronary artery disease  -Stable.  Denies chest pain  -Resume home metoprolol  -RESUME  Aspirin when OK with general surgery           Clinically Significant Risk Factors Present on Admission                # Drug Induced Platelet Defect: home medication list includes an antiplatelet medication                 Miguelito Price MD  Hospitalist Service  Securely message with Vocera (more info)  Text page via [employer] Paging/Directory   ______________________________________________________________________    Chief Complaint   Colon cancer    History is obtained from the patient    History of Present Illness   Nash De Leon is a 72 year old male with established diagnosis of coronary cancer, coronary artery disease who was brought to the hospital to undergo hemicolectomy due to colon cancer.  Surgery was uneventful.  Hospital medicine was consulted for management of chronic medical conditions including coronary artery disease.    During my encounter with the patient this was after surgery.  He had postop pain but it was manageable with the current pain medication.  Denies nausea, vomiting, headache.  For coronary disease patient underwent he is on aspirin, metoprolol.       Past Medical History    Past Medical History:   Diagnosis Date     Antiplatelet or antithrombotic long-term use      CAD (coronary artery disease)      History of BPH      Meniere's disease      Presence of stent in  coronary artery in patient with coronary artery disease      Stented coronary artery        Past Surgical History   Past Surgical History:   Procedure Laterality Date     CARDIAC SURGERY      Stents     COLONOSCOPY       COLONOSCOPY N/A 7/13/2023    Procedure: COLONOSCOPY WITH BIOPSY AND POLYPECTOMY;  Surgeon: Bruce Patel MD;  Location: [facility]     COMBINED CYSTOSCOPY, RETROGRADES, URETEROSCOPY, LASER HOLMIUM LITHOTRIPSY URETER(S), INSERT STENT Left 8/23/2018    Procedure: COMBINED CYSTOSCOPY, RETROGRADES, URETEROSCOPY, LASER HOLMIUM LITHOTRIPSY URETER(S), INSERT STENT;  Cystoscopy, Left Ueteroscopy with Holmium Laser Lithotripsy, Left Stent Placement;  Surgeon: Terrell Forman MD;  Location: [facility]     COMBINED CYSTOSCOPY, RETROGRADES, URETEROSCOPY, LASER HOLMIUM LITHOTRIPSY URETER(S), INSERT STENT Left 9/13/2018    Procedure: COMBINED CYSTOSCOPY, RETROGRADES, URETEROSCOPY, LASER HOLMIUM LITHOTRIPSY URETER(S), INSERT STENT;  Cystoscopy, Left Ureteroscopy with Stone Basketing, Left Stent Exchange ;  Surgeon: Terrell Forman MD;  Location: [facility]       Medications   I have reviewed this patient's current medications       Review of Systems    The 10 point Review of Systems is negative other than noted in the HPI   Physical Exam   Vital Signs: Temp: 98.5  F (36.9  C) Temp src: Tympanic BP: 113/69 Pulse: 74   Resp: 18 SpO2: 96 % O2 Device: None (Room air)    Weight: 142 lbs 0 oz    General: Alert awake oriented no acute distress  HEENT: Atraumatic normocephalic  Cardiovascular: Regular rate and rhythm no murmur  Pulmonary: Clear breath sound bilaterally  GI: Postop incision present, postop tenderness abdomen  Skin: Warm, no rash  Neuro: Alert awake oriented, no focal neurologic deficits  Psychiatric: Normal affect, denies being depressed    Medical Decision Making       78 MINUTES SPENT BY ME on the date of service doing chart review, history, exam, documentation & further activities per the note.       Data   ------------------------- PAST 24 HR DATA REVIEWED -----------------------------------------------        Imaging results reviewed over the past 24 hrs:   No results found for this or any previous visit (from the past 24 hour(s)).

## 2023-07-18 NOTE — PROGRESS NOTES
[facility]    Medicine Progress Note - Hospitalist Service    Date of Admission:  7/17/2023    Assessment & Plan   Nash De Leon is a 72 year old male admitted on 7/17/2023 with colon cancer. Hospital Medicine was consulted for medical management .    #Colon cancer  - 72 year old male with established diagnosis of colon cancer, who was brought to the hospital and underwent  Exploratory laparotomy, extended Right Hemicolectomy with takedown of splenic flexure, liver biopsy with Dr Patel.   -Management per general surgery    #Coronary artery disease  -no chest pain   -Home metoprolol  -RESUME  Aspirin when OK with general surgery       Diet: Clear Liquid Diet    DVT Prophylaxis: Defer to primary service  Chavez Catheter: PRESENT, indication: /GI/GYN Pelvic Procedure  Lines: None     Cardiac Monitoring: None  Code Status: Full Code      Clinically Significant Risk Factors Present on Admission              # Hypoalbuminemia: Lowest albumin = 3.1 g/dL at 7/18/2023  5:42 AM, will monitor as appropriate   # Drug Induced Platelet Defect: home medication list includes an antiplatelet medication                 Disposition Plan      Expected Discharge Date: 07/23/2023                  Asael Malik DO  Hospitalist Service  [facility]  Securely message with [employer] (more info)  Text page via [employer] Paging/Directory   ______________________________________________________________________    Interval History   Patient was seen this morning no chest pain. Post op stable   no fevers      Physical Exam   Vital Signs: Temp: 97.9  F (36.6  C) Temp src: Tympanic BP: 121/61 Pulse: 60   Resp: 18 SpO2: 95 % O2 Device: None (Room air)    Weight: 142 lbs 0 oz    Physical Exam  Constitutional:       Appearance: He is obese.   HENT:      Right Ear: External ear normal.      Left Ear: External ear normal.      Mouth/Throat:      Pharynx: Oropharynx is clear.   Cardiovascular:      Rate and Rhythm: Normal  rate.   Pulmonary:      Effort: Pulmonary effort is normal.   Abdominal:      General: Abdomen is flat.   Musculoskeletal:         General: No swelling.   Skin:     Coloration: Skin is not jaundiced.   Neurological:      Mental Status: He is alert. Mental status is at baseline.           Medical Decision Making       43 MINUTES SPENT BY ME on the date of service doing chart review, history, exam, documentation & further activities per the note.      Data     I have personally reviewed the following data over the past 24 hrs:    17.3 (H)  \   13.9   / 173     137 102 21.5 /  163 (H)   4.8 24 1.03 \       ALT: 28 AST: 48 (H) AP: 59 TBILI: 0.7   ALB: 3.1 (L) TOT PROTEIN: 5.5 (L) LIPASE: N/A       Imaging results reviewed over the past 24 hrs:   No results found for this or any previous visit (from the past 24 hour(s)).

## 2023-07-18 NOTE — PLAN OF CARE
Returned from surg at 1715. Assess as charted. Was able to dangle at edge of bed. Chavez intact. Surg dressing was leaking sm amt tommy spain. Dr Patel updated. Re-inforced dressing. Family at bedside. Tolerating po apple juice. No nausea. Med with 0.4 Dilaudid X2 this shift- good relief of pain. Hospitalist here tonight to see pt- as he was consulted.

## 2023-07-18 NOTE — PROGRESS NOTES
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]  Colon cancer metastasized to liver (H) [C18.9, C78.7]    Procedure(s):  Extended HEMICOLECTOMY, RIGHT, OPEN Exploratory Laparotony and Liver biopsy     POD: 1 Day Post-Op    Current Diet: Clear liquids    CURRENT MEDICATIONS:  Continuous Medications:  Current Facility-Administered Medications   Medication Last Rate     lactated ringers 100 mL/hr at 07/18/23 0837       Scheduled Medications:  Current Facility-Administered Medications   Medication Dose Route Frequency     acetaminophen  975 mg Oral Q8H     ceFAZolin  2 g Intravenous See Admin Instructions     famotidine  20 mg Oral BID    Or     famotidine  20 mg Intravenous BID     sodium chloride (PF)  3 mL Intracatheter Q8H       PRN Medications:  Current Facility-Administered Medications   Medication Dose Route Frequency     [START ON 7/20/2023] acetaminophen  650 mg Oral Q4H PRN     HYDROmorphone  0.2 mg Intravenous Q2H PRN    Or     HYDROmorphone  0.4 mg Intravenous Q2H PRN     HYDROmorphone  2 mg Oral Q4H PRN    Or     HYDROmorphone  4 mg Oral Q4H PRN     lidocaine 4%   Topical Q1H PRN     lidocaine (buffered or not buffered)  0.1-1 mL Other Q1H PRN     naloxone  0.2 mg Intravenous Q2 Min PRN    Or     naloxone  0.4 mg Intravenous Q2 Min PRN    Or     naloxone  0.2 mg Intramuscular Q2 Min PRN    Or     naloxone  0.4 mg Intramuscular Q2 Min PRN     ondansetron  4 mg Oral Q6H PRN    Or     ondansetron  4 mg Intravenous Q6H PRN     prochlorperazine  5 mg Intravenous Q6H PRN    Or     prochlorperazine  5 mg Oral Q6H PRN     sodium chloride (PF)  3 mL Intracatheter q1 min prn       SUBJECTIVE:   Nausea: No. Vomiting: No. Fever: No. Chills: No. Excessive burping: No. Flatus: Yes. BM: Yes. Pain control: good. Tolerating current diet: Yes.     PHYSICAL EXAM:   Vital signs: /72 (BP  "Location: Left arm, Patient Position: Sitting, Cuff Size: Adult Regular)   Pulse 57   Temp 97.7  F (36.5  C) (Tympanic)   Resp 18   Ht 1.626 m (5' 4\")   Wt 64.4 kg (142 lb)   SpO2 95%   BMI 24.37 kg/m     BMI: Body mass index is 24.37 kg/m .   General: Normal, healthy, cooperative, in no acute distress, alert   Lungs: respirations are non-labored   Abdominal: non-distended   Wound: dressing intact to incisional site   Extremities: No cyanosis, clubbing or edema noted bilaterally in Upper and Lower Extremities   Neurological: without deficit    INPUT/OUTPUT:      Intake/Output Summary (Last 24 hours) at 7/18/2023 0855  Last data filed at 7/18/2023 0820  Gross per 24 hour   Intake 3397 ml   Output 325 ml   Net 3072 ml       I/O last 3 completed shifts:  In: 2000 [I.V.:2000]  Out: 225 [Urine:225]    LABS:    Last CBC Rrsults:   Recent Labs   Lab Test 07/18/23  0542 06/25/23  1744   WBC 17.3* 11.1*   RBC 4.56 5.47   HGB 13.9 16.7   HCT 41.0 49.2   MCV 90 90   MCH 30.5 30.5   MCHC 33.9 33.9   RDW 12.4 12.4    237       Last Comprehensive Metabolic panel:  Recent Labs   Lab Test 07/18/23  0542 07/18/23  0158 07/17/23  1048 06/25/23  1744 05/19/18  0000     --   --  133*  --    POTASSIUM 4.8  --   --  4.2 4.1   CHLORIDE 102  --   --  96*  --    CO2 24  --   --  25  --    ANIONGAP 11  --   --  12  --    * 144* 125* 96 117*   BUN 21.5  --   --  16.7  --    CR 1.03  --   --  1.04 1.17   GFRESTIMATED 76  --   --  75 >60   ABRAHAM 9.0  --   --  9.5  --    BILITOTAL 0.7  --   --  0.5  --    ALKPHOS 59  --   --  63  --    ALT 28  --   --  9 28   AST 48*  --   --  18 19       Recent Labs   Lab Test 07/18/23  0542 06/25/23  1744   ALBUMIN 3.1* 4.0       ASSESSMENT:    1 Day Post-Op from Procedure(s):  Extended HEMICOLECTOMY, RIGHT, OPEN Exploratory Laparotony and Liver biopsy.      PLAN:   IV fluids  Clear liquid diet advance as tolerated  Up ambulating    "

## 2023-07-19 NOTE — PROGRESS NOTES
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]  Colon cancer metastasized to liver (H) [C18.9, C78.7]    Procedure(s):  Extended HEMICOLECTOMY, RIGHT, OPEN Exploratory Laparotony and Liver biopsy     POD: 2 Days Post-Op    Current Diet: Regular    CURRENT MEDICATIONS:  Continuous Medications:  Current Facility-Administered Medications   Medication Last Rate     lactated ringers 100 mL/hr at 07/19/23 0350       Scheduled Medications:  Current Facility-Administered Medications   Medication Dose Route Frequency     acetaminophen  975 mg Oral Q8H     famotidine  20 mg Oral BID    Or     famotidine  20 mg Intravenous BID     metoprolol succinate ER  12.5 mg Oral QAM     sodium chloride (PF)  3 mL Intracatheter Q8H       PRN Medications:  Current Facility-Administered Medications   Medication Dose Route Frequency     [START ON 7/20/2023] acetaminophen  650 mg Oral Q4H PRN     diazepam  5 mg Oral Q6H PRN     HYDROmorphone  0.2 mg Intravenous Q2H PRN    Or     HYDROmorphone  0.4 mg Intravenous Q2H PRN     HYDROmorphone  2 mg Oral Q4H PRN    Or     HYDROmorphone  4 mg Oral Q4H PRN     lidocaine 4%   Topical Q1H PRN     lidocaine (buffered or not buffered)  0.1-1 mL Other Q1H PRN     naloxone  0.2 mg Intravenous Q2 Min PRN    Or     naloxone  0.4 mg Intravenous Q2 Min PRN    Or     naloxone  0.2 mg Intramuscular Q2 Min PRN    Or     naloxone  0.4 mg Intramuscular Q2 Min PRN     ondansetron  4 mg Oral Q6H PRN    Or     ondansetron  4 mg Intravenous Q6H PRN     prochlorperazine  5 mg Intravenous Q6H PRN    Or     prochlorperazine  5 mg Oral Q6H PRN     sodium chloride (PF)  3 mL Intracatheter q1 min prn       SUBJECTIVE:   Nausea: No. Vomiting: No. Fever: No. Chills: No. Excessive burping: No. Flatus: Yes. BM: Yes. Pain control: good. Tolerating current diet: Yes.     PHYSICAL EXAM:   Vital  "signs: /54 (BP Location: Left arm, Patient Position: Supine, Cuff Size: Adult Regular)   Pulse 74   Temp 98.6  F (37  C) (Tympanic)   Resp 16   Ht 1.626 m (5' 4\")   Wt 64.4 kg (142 lb)   SpO2 93%   BMI 24.37 kg/m     BMI: Body mass index is 24.37 kg/m .   General: Normal, healthy, cooperative, in no acute distress, alert   Lungs: respirations are non-labored   Abdominal: non-distended   Wound:staples intact   Extremities: No cyanosis, clubbing or edema noted bilaterally in Upper and Lower Extremities   Neurological: without deficit    INPUT/OUTPUT:      Intake/Output Summary (Last 24 hours) at 7/19/2023 0939  Last data filed at 7/19/2023 0900  Gross per 24 hour   Intake 2561 ml   Output 1725 ml   Net 836 ml       I/O last 3 completed shifts:  In: 4218 [P.O.:500; I.V.:3718]  Out: 1825 [Urine:1825]    LABS:    Last CBC Rrsults:   Recent Labs   Lab Test 07/19/23  0506 07/18/23  0542 06/25/23  1744   WBC 24.0* 17.3* 11.1*   RBC 4.45 4.56 5.47   HGB 13.5 13.9 16.7   HCT 39.3* 41.0 49.2   MCV 88 90 90   MCH 30.3 30.5 30.5   MCHC 34.4 33.9 33.9   RDW 12.5 12.4 12.4    173 237       Last Comprehensive Metabolic panel:  Recent Labs   Lab Test 07/19/23  0506 07/18/23  1756 07/18/23  1506 07/18/23  0853 07/18/23  0542 07/17/23  1048 06/25/23  1744   *  --   --   --  137  --  133*   POTASSIUM 4.2  --   --   --  4.8  --  4.2   CHLORIDE 101  --   --   --  102  --  96*   CO2 23  --   --   --  24  --  25   ANIONGAP 11  --   --   --  11  --  12   * 158* 148*   < > 163*   < > 96   BUN 15.6  --   --   --  21.5  --  16.7   CR 0.78  --   --   --  1.03  --  1.04   GFRESTIMATED >90  --   --   --  76  --  75   ABRAHAM 8.8  --   --   --  9.0  --  9.5   BILITOTAL 0.4  --   --   --  0.7  --  0.5   ALKPHOS 56  --   --   --  59  --  63   ALT 34  --   --   --  28  --  9   AST 40  --   --   --  48*  --  18    < > = values in this interval not displayed.       Recent Labs   Lab Test 07/19/23  0506 07/18/23  0542 " 06/25/23  1744   ALBUMIN 3.0* 3.1* 4.0       ASSESSMENT:    2 Days Post-Op from Procedure(s):  Extended HEMICOLECTOMY, RIGHT, OPEN Exploratory Laparotony and Liver biopsy.    PLAN:   Discharge home today

## 2023-07-19 NOTE — PROGRESS NOTES
[facility]    Medicine Progress Note - Hospitalist Service    Date of Admission:  7/17/2023    Assessment & Plan   Nash De Leon is a 72 year old male admitted on 7/17/2023 with colon cancer. Hospital Medicine was consulted for medical management .    #Colon cancer  Leukocytosis   - 72 year old male with established diagnosis of colon cancer, who was brought to the hospital and underwent  Exploratory laparotomy, extended Right Hemicolectomy with takedown of splenic flexure, liver biopsy with Dr Patel.   -Management per general surgery  On 7/19 wbc worse no fevers pain minimal received dexamethasone in or.  Discussed with Dr Patel will monitor    #Coronary artery disease  -no chest pain   -Home metoprolol  -RESUME  Aspirin when OK with general surgery         Diet: Advance Diet as Tolerated: Regular Diet Adult    DVT Prophylaxis: Anti-embolisim stockings (TEDs)  Chavez Catheter: Not present  Lines: None     Cardiac Monitoring: None  Code Status: Full Code      Clinically Significant Risk Factors              # Hypoalbuminemia: Lowest albumin = 3 g/dL at 7/19/2023  5:06 AM, will monitor as appropriate                     Disposition Plan     Expected Discharge Date: 07/23/2023                  Asael Malik, DO  Hospitalist Service  [facility]  Securely message with [employer] (more info)  Text page via [employer] Paging/Directory   ______________________________________________________________________    Interval History   Patient seen today no chest pain or shortness of breath    Physical Exam   Vital Signs: Temp: 98.6  F (37  C) Temp src: Tympanic BP: 124/54 Pulse: 74   Resp: 16 SpO2: 93 % O2 Device: None (Room air)    Weight: 142 lbs 0 oz  Physical Exam  HENT:      Right Ear: External ear normal.      Left Ear: External ear normal.      Mouth/Throat:      Pharynx: Oropharynx is clear.   Eyes:      Conjunctiva/sclera: Conjunctivae normal.   Cardiovascular:      Rate and Rhythm: Normal rate.    Pulmonary:      Effort: Pulmonary effort is normal.   Abdominal:      General: Abdomen is flat.   Musculoskeletal:         General: Normal range of motion.   Skin:     General: Skin is warm.   Neurological:      Mental Status: He is alert. Mental status is at baseline.           Medical Decision Making       35 MINUTES SPENT BY ME on the date of service doing chart review, history, exam, documentation & further activities per the note.      Data     I have personally reviewed the following data over the past 24 hrs:    24.0 (H)  \   13.5   / 168     135 (L) 101 15.6 /  137 (H)   4.2 23 0.78 \       ALT: 34 AST: 40 AP: 56 TBILI: 0.4   ALB: 3.0 (L) TOT PROTEIN: 5.3 (L) LIPASE: N/A       Imaging results reviewed over the past 24 hrs:   No results found for this or any previous visit (from the past 24 hour(s)).

## 2023-07-19 NOTE — DISCHARGE SUMMARY
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  The patient tolerated the procedure(s) well and was transferred to the avendaño.  His postoperative course has been completely unremarkable.  At the time of discharge he is eating a Regular diet, is having good pain control on oral medications, and is passing gas and is having bowel movements.  The patient will be discharged home in good condition.    Discharge instructions include:    Patient will be discharged to Home   Diet:   Active Diet and Nourishment Order   Procedures     Advance Diet as Tolerated: Regular Diet Adult     Diet      Activity : no lifting restrictions and no liting over 10 pounds for 6 weeks   Follow-up:     The patient will follow up with his primary care provider in 1 weeks.      The patient will follow up with me in 1 weeks.   Medications include:    All prior medications    Oxycodone (Norco) for pain control.

## 2023-07-19 NOTE — PLAN OF CARE
Goal Outcome Evaluation:VSS, no c/o pain/discomfort.  Incision is open to air, washed with soap and water this a.m  Tolerating a regular diet.  Bwls active and had BM today.  Makes needs known.  Wife coming at approx 1600 to pick him up.  Face to face report given with opportunity to observe patient.    Report given to Jessa Zayas RN   7/19/2023  3:32 PM    Patient discharged at 5:00 PM via wheel chair accompanied by spouse and staff. Prescriptions sent to patients preferred pharmacy. All belongings sent with patient.     Discharge instructions reviewed with pt. Listed belongings gathered and returned to patient. yes    Patient discharged to home.   Report called to na    Surgical Patient   Surgical Procedures during stay: yes  Did patient receive discharge instruction on wound care and recognition of infection symptoms? Yes    MISC  Follow up appointment made:  Yes  Home medications returned to patient: N/A  Patient reports pain was well managed at discharge: Yes

## 2023-07-19 NOTE — PLAN OF CARE
"Goal Outcome Evaluation:       Reason for hospital stay:  colon cancer   Living situation PTA: home   Most recent vitals: /50 (BP Location: Left arm, Patient Position: Supine, Cuff Size: Adult Regular)   Pulse 85   Temp 97.7  F (36.5  C) (Tympanic)   Resp 16   Ht 1.626 m (5' 4\")   Wt 64.4 kg (142 lb)   SpO2 95%   BMI 24.37 kg/m      Pain Management:  pscheduled tylenol   LOC:  A&O   Cardiac:  apical regular   Respiratory:  room air, lungs clear   GI:  loose stools, bowel sounds active   :  catheter removed at 0650  Skin Issues:  mid line ABD dressing - minimal drainage noted on dressing     IVF:   ml/hr   ABX:  none    Nutrition: regular diet   ADL's:  independent   Ambulation:stand by   Safety:  call light in place, bed in low position with wheels locked    Comments: pt tolerated dinner with no issues     Face to face report given with opportunity to observe patient.    Report given to Allan Duvall RN   7/19/2023  7:19 AM          7/19/2023  4:24 AM  Otf Duvall RN                   "

## 2023-07-19 NOTE — PLAN OF CARE
Goal Outcome Evaluation:         Face to face report given with opportunity to observe patient.    Report given to Otf Dominguez RN   7/18/2023  7:18 PM

## 2023-07-19 NOTE — PROGRESS NOTES
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    Follow-Up Appt:   Future Appointments   Date Time Provider Department Center   7/25/2023 10:30 AM Sami Lewis DO [facility]       Other Providers (Care Coordinator, County Services, PCA services etc): No    Discharge Disposition: home via spouse/Cass      Pt or health care agent verbalized understanding.         Lauren Ledezma CM RN

## 2023-07-20 NOTE — PROGRESS NOTES
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  The patient tolerated the procedure(s) well and was transferred to the avendaño.  His postoperative course has been completely unremarkable.  At the time of discharge he is eating a Regular diet, is having good pain control on oral medications, and is passing gas and is having bowel movements.  The patient will be discharged home in good condition.     Discharge instructions include:               Patient will be discharged to Home              Diet:     ASSESSMENT         Call placed to patient, no answer to review Post Discharge Assessment on 7/20/23 at 826 am. Patient is scheduled for upcoming follow up with Dr. Lewis on 7/25/23 and Zina Giraldo on 7/26/23 at 300 pm.                 PLAN                                                      Outpatient Plan:      Future Appointments   Date Time Provider Department Center   7/25/2023 10:30 AM Sami Lewis DO [facility]  [facility]   7/26/2023  3:00 PM Zina Giraldo, NP [facility]         For any urgent concerns, please contact our 24 hour nurse triage line: [phone] ([phone])         Elena Smith RN

## 2023-07-21 PROBLEM — K91.872 POSTOPERATIVE SEROMA INVOLVING DIGESTIVE SYSTEM AFTER DIGESTIVE SYSTEM PROCEDURE: Status: ACTIVE | Noted: 2023-01-01

## 2023-07-21 PROBLEM — R11.2 NAUSEA WITH VOMITING: Status: ACTIVE | Noted: 2023-01-01

## 2023-07-22 PROBLEM — R11.2 NAUSEA & VOMITING: Status: ACTIVE | Noted: 2023-01-01

## 2023-07-22 NOTE — PLAN OF CARE
Pain med- Norco and IV Compazine at 1635 and 1653. Good relief from both. No longer has nausea and pain nearly gone per pt.       2135 Rates pain at 4/10. Declines pain med. Denies nausea.

## 2023-07-22 NOTE — PROGRESS NOTES
.  INPATIENT ROUNDING NOTE  7/22/2023    Patient: Nash Bernardoalley    Physician of Record: Elyse Montes MD    Admitting diagnosis: Dehydration, Nausea & vomiting [R11.2]    Reason for Admission: Postoperative ileus with nausea, vomiting and dehydration    Length of stay: 1    Current Diet: NPO    CURRENT MEDICATIONS:  Continuous Medications:  Current Facility-Administered Medications   Medication Last Rate     sodium chloride 100 mL/hr at 07/22/23 0042       Scheduled Medications:  Current Facility-Administered Medications   Medication Dose Route Frequency     enoxaparin ANTICOAGULANT  40 mg Subcutaneous Q24H     metoprolol succinate ER  12.5 mg Oral QAM     sodium chloride (PF)  3 mL Intracatheter Q8H       PRN Medications:  Current Facility-Administered Medications   Medication Dose Route Frequency     HYDROcodone-acetaminophen  1 tablet Oral Q6H PRN     lidocaine 4%   Topical Q1H PRN     lidocaine (buffered or not buffered)  0.1-1 mL Other Q1H PRN     melatonin  1 mg Oral At Bedtime PRN     naloxone  0.2 mg Intravenous Q2 Min PRN    Or     naloxone  0.4 mg Intravenous Q2 Min PRN    Or     naloxone  0.2 mg Intramuscular Q2 Min PRN    Or     naloxone  0.4 mg Intramuscular Q2 Min PRN     nitroGLYcerin  0.4 mg Sublingual Q5 Min PRN     ondansetron  4 mg Oral Q6H PRN    Or     ondansetron  4 mg Intravenous Q6H PRN     sodium chloride (PF)  3 mL Intracatheter q1 min prn       SUBJECTIVE:   Nausea: Yes. Vomiting: Yes. Fever: No. Chills: No. Excessive burping: No. Flatus: Yes. BM: No. Pain is 1/10. Pain control: good.  Since admission last night, the patient has started passing flatus.  He feels dramatically better this morning than he did last night when he presented to the emergency department.  He denies any bowel movements.  He is not hungry at this time, he did mention that he had several burps and was nauseous and then vomited twice.  He denies any fevers, chills or significant abdominal pain at this  "time.    PHYSICAL EXAM:   Vital signs: /89 (BP Location: Right arm, Patient Position: Semi-Estevez's, Cuff Size: Adult Regular)   Pulse 89   Temp 99.2  F (37.3  C) (Tympanic)   Resp 16   Ht 1.626 m (5' 4\")   Wt 63.5 kg (139 lb 15.9 oz)   SpO2 95%   BMI 24.03 kg/m     Weight: [unfilled]   BMI: Body mass index is 24.03 kg/m .   General: in no acute distress, alert   Lungs: clear to auscultation   CV: Regular rate and rhythm without murmer   Abdominal: Abdomen soft, non-tender. BS normal. No masses, organomegaly   Wound: Incision is clean, dry, intact without evidence of cellulitis or dehiscence.    INPUT/OUTPUT:      Intake/Output Summary (Last 24 hours) at 7/22/2023 0814  Last data filed at 7/22/2023 0500  Gross per 24 hour   Intake 439 ml   Output --   Net 439 ml       I/O last 3 completed shifts:  In: 439 [I.V.:439]  Out: -     LABS:    Last CBC Rrsults:   Recent Labs   Lab Test 07/22/23  0516 07/21/23 1849 07/19/23  0506   WBC 16.6* 17.3* 24.0*   RBC 4.73 5.38 4.45   HGB 14.3 16.2 13.5   HCT 42.3 47.7 39.3*   MCV 89 89 88   MCH 30.2 30.1 30.3   MCHC 33.8 34.0 34.4   RDW 12.7 12.7 12.5    201 168       Last Comprehensive Metabolic panel:  Recent Labs   Lab Test 07/22/23  0516 07/21/23  1849 07/19/23  0506    134* 135*   POTASSIUM 4.1 4.2 4.2   CHLORIDE 101 94* 101   CO2 23 26 23   ANIONGAP 12 14 11   * 129* 137*   BUN 15.1 16.0 15.6   CR 0.78 0.90 0.78   GFRESTIMATED >90 90 >90   ABRAHAM 8.6* 9.9 8.8   BILITOTAL 0.6 0.7 0.4   ALKPHOS 93 113 56   ALT 21 29 34   AST 22 29 40       Recent Labs   Lab Test 07/22/23  0516 07/21/23  1849 07/19/23  0506   ALBUMIN 3.2* 3.9 3.0*     ASSESSMENT:    72 year old male admitted for Postoperative seroma involving digestive system after digestive system procedure [K91.872]  Nausea & vomiting [R11.2].   Hospital day 1.    PLAN: We will advance the patient's activity.  We will continue both chemical mechanical DVT prophylaxis.  Await for resolution of his " ileus.  Patient is in no hurry to be discharged to home.  He thinks that he left the hospital too early and he tried to push his diet too much.  Once his ileus resolves, we will advance him to clear liquids and advance very, very slowly.  Patient's wife was in the room this morning, she spent the night last night.

## 2023-07-22 NOTE — ED NOTES
.DATE/TIME OF CALL RECEIVED FROM LAB:  7/21/2023 at 7:25 PM   LAB TEST:  Lactic Acid   LAB VALUE:  2.8  PROVIDER NOTIFIED?: Yes  PROVIDER NAME: WILEY BRADFORD   DATE/TIME LAB VALUE REPORTED TO PROVIDER: 7/21/2023 at 7:25 PM  MECHANISM OF PROVIDER NOTIFICATION: Face-To-Face  PROVIDER RESPONSE: Initiate IVFs 1925

## 2023-07-22 NOTE — PLAN OF CARE
Free from falls/injuries this shift. Assess as charted. Up to BR indep. Steady on feet. Clear liq diet ordered, tho pt had had only sips of water. Nausea-given IV Zofran, then had emesis. Order rec;d for IV Compazine. Good relief from nausea with Compazine. Had Norco for pain at 1653-good relief of pain from Norco. IVF @ 125/hr. Up to BR indep. Reminded to save urine for measurement.     Face to face report given with opportunity to observe patient.    Report given to Alis Abrams RN   7/22/2023  11:20 PM

## 2023-07-22 NOTE — H&P
.  [facility]    History and Physical  General Surgery     Date of Admission:  7/21/2023    Assessment & Plan   Postoperative nausea and vomiting  Dehydration  Essential hypertension  Recent extended right hemicolectomy  Potential liver metastases    After reviewing the CT scan and the labs with the patient, I did discuss with them about my concern for air in the abdomen that was out of proportion to what it should be.  I was discussing with them the importance of being aggressive with exploration to verify that there was no anastomotic leak.  They did request that I talk with the surgeon of record, I called him in the middle the night, he looked at the CT scan and to was concerned about the air in the abdomen.  We reviewed the radiologist report as well.  I then went back and talked with the patient again and the patient and his wife decided that they want to hold off on any type of exploration for further evaluation.  Patient states that if he gets sick, then he would be interested in going back to the operating room.  I felt discussed with him that I would not want him to get sick because sometimes it is very difficult to make people healthy again once they are septic and have peritonitis.  Both the patient and his wife understand the potential risks of holding off on exploration.  We will admit the patient to the hospital floor, continue IV fluids, continue antiemetics, repeat lab in the morning and continue with bowel rest.  If the patient takes a turn for the worse, we will plan for exploratory laparotomy at that time.    HUSAM CARRIZALES MD    Code Status   Full Code    Chief Complaint   Nausea and vomiting x24 hours    History of Present Illness   Nash De Leon is a 72 year old male who presents to the emergency room this evening with 24 hours of nausea, vomiting, not passing flatus and now not passing stool.  When he was discharged from the hospital on 19 July, he had normal bowel function.   Last evening, he began having nausea.  He has been vomiting through the day.  He denies any fevers, chills or worsening abdominal pain.  He has been taking the hydrocodone which they are concerned could be adding to the symptoms.  He did present to the emergency department and was worked up by the emergency room physicians.  Based on the lab findings and CT scan findings, we were called for further evaluation.    Past Medical History    I have reviewed this patient's medical history and updated it with pertinent information if needed.   Past Medical History:   Diagnosis Date     Antiplatelet or antithrombotic long-term use      CAD (coronary artery disease)      History of BPH      Meniere's disease      Presence of stent in coronary artery in patient with coronary artery disease      Stented coronary artery        Past Surgical History   I have reviewed this patient's surgical history and updated it with pertinent information if needed.  Past Surgical History:   Procedure Laterality Date     CARDIAC SURGERY      Stents     COLECTOMY RIGHT N/A 7/17/2023    Procedure: Extended HEMICOLECTOMY, RIGHT, OPEN Exploratory Laparotony and Liver biopsy;  Surgeon: Bruce Patel MD;  Location: [facility]     COLONOSCOPY       COLONOSCOPY N/A 7/13/2023    Procedure: COLONOSCOPY WITH BIOPSY AND POLYPECTOMY;  Surgeon: Bruce Patel MD;  Location: [facility]     COMBINED CYSTOSCOPY, RETROGRADES, URETEROSCOPY, LASER HOLMIUM LITHOTRIPSY URETER(S), INSERT STENT Left 8/23/2018    Procedure: COMBINED CYSTOSCOPY, RETROGRADES, URETEROSCOPY, LASER HOLMIUM LITHOTRIPSY URETER(S), INSERT STENT;  Cystoscopy, Left Ueteroscopy with Holmium Laser Lithotripsy, Left Stent Placement;  Surgeon: Terrell Forman MD;  Location: [facility]     COMBINED CYSTOSCOPY, RETROGRADES, URETEROSCOPY, LASER HOLMIUM LITHOTRIPSY URETER(S), INSERT STENT Left 9/13/2018    Procedure: COMBINED CYSTOSCOPY, RETROGRADES, URETEROSCOPY, LASER HOLMIUM LITHOTRIPSY  URETER(S), INSERT STENT;  Cystoscopy, Left Ureteroscopy with Stone Basketing, Left Stent Exchange ;  Surgeon: Terrell Forman MD;  Location: [facility] OR       Prior to Admission Medications   Prior to Admission Medications   Prescriptions Last Dose Informant Patient Reported? Taking?   HYDROcodone-acetaminophen (NORCO) 5-325 MG tablet 7/20/2023  No Yes   Sig: Take 1 tablet by mouth every 6 hours as needed for pain   aspirin 81 MG tablet 7/20/2023  Yes Yes   Sig: Take 81 mg by mouth every morning   metoprolol succinate ER (TOPROL XL) 25 MG 24 hr tablet 7/20/2023  Yes Yes   Sig: Take 12.5 mg by mouth every morning   nitroGLYcerin (NITROSTAT) 0.4 MG sublingual tablet Unknown  Yes Yes   Sig: Place 0.4 mg under the tongue every 5 minutes as needed for Chest pain. Do not crush; maximum of 3 doses in 15 minutes.      Facility-Administered Medications: None     Allergies   No Known Allergies    Social History   I have reviewed this patient's social history and updated it with pertinent information if needed. Nash STAPLES Moises  reports that he quit smoking about 25 years ago. His smoking use included cigarettes. He has never used smokeless tobacco. He reports that he does not drink alcohol and does not use drugs.    Family History   I have reviewed this patient's family history and updated it with pertinent information if needed.   Family History   Problem Relation Age of Onset     Colon Cancer Mother      Alzheimer Disease Father        Review of Systems   The 5 point Review of Systems is negative other than noted in the HPI or here.     Physical Exam   Temp: 98  F (36.7  C) Temp src: Tympanic BP: 135/94 Pulse: 95   Resp: 16 SpO2: 95 % O2 Device: None (Room air)    Vital Signs with Ranges  Temp:  [98  F (36.7  C)] 98  F (36.7  C)  Pulse:  [] 95  Resp:  [16] 16  BP: (125-171)/(86-94) 135/94  SpO2:  [94 %-97 %] 95 %  0 lbs 0 oz    Neck: Trachea is in the midline without thyromegaly or lymphadenopathy there is no  crepitance.  Lungs: Clear to auscultation bilaterally without rales or rhonchi, no pain on inspiration  Heart: Regular rate and rhythm without murmurs, rubs or gallops  Abdomen: Soft, minimally distended, midline tenderness as expected without peritoneal findings or rebound.  He has positive bowel sounds throughout.  Wound: Clean, dry, intact without evidence of cellulitis    Data   Results for orders placed or performed during the hospital encounter of 07/21/23 (from the past 24 hour(s))   [place] Draw    Narrative    The following orders were created for panel order [place] Draw.  Procedure                               Abnormality         Status                     ---------                               -----------         ------                     Extra Blue Top Tube[[id number]]                              Final result               Extra Red Top Tube[[id number]]                               Final result               Extra Green Top (Lithium...[[id number]]                      Final result               Extra Purple Top Tube[[id number]]                            Final result               Extra Green Top (Lithium...[[id number]]                      Final result                 Please view results for these tests on the individual orders.   Extra Blue Top Tube   Result Value Ref [facility]    Hold Specimen JIC    Extra Red Top Tube   Result Value Ref Range    Hold Specimen JIC    Extra Green Top (Lithium Heparin) Tube   Result Value Ref Range    Hold Specimen JIC    Extra Purple Top Tube   Result Value Ref Range    Hold Specimen JIC    Extra Green Top (Lithium Heparin) ON ICE   Result Value Ref Range    Hold Specimen JIC    CBC with platelets differential    Narrative    The following orders were created for panel order CBC with platelets differential.  Procedure                               Abnormality         Status                     ---------                               -----------         ------                      CBC with platelets and d...[[id number]]  Abnormal            Final result                 Please view results for these tests on the individual orders.   Comprehensive metabolic panel   Result Value Ref [facility]    Sodium 134 (L) 136 - 145 mmol/L    Potassium 4.2 3.4 - 5.3 mmol/L    Chloride 94 (L) 98 - 107 mmol/L    Carbon Dioxide (CO2) 26 22 - 29 mmol/L    Anion Gap 14 7 - 15 mmol/L    Urea Nitrogen 16.0 8.0 - 23.0 mg/dL    Creatinine 0.90 0.67 - 1.17 mg/dL    Calcium 9.9 8.8 - 10.2 mg/dL    Glucose 129 (H) 70 - 99 mg/dL    Alkaline Phosphatase 113 40 - 129 U/L    AST 29 0 - 45 U/L    ALT 29 0 - 70 U/L    Protein Total 6.8 6.4 - 8.3 g/dL    Albumin 3.9 3.5 - 5.2 g/dL    Bilirubin Total 0.7 <=1.2 mg/dL    GFR Estimate 90 >60 mL/min/1.73m2   Lipase   Result Value Ref [facility]    Lipase 28 13 - 60 U/L   CRP inflammation   Result Value Ref Range    CRP Inflammation 65.84 (H) <5.00 mg/L   Lactic acid whole blood   Result Value Ref Range    Lactic Acid 2.8 (H) 0.7 - 2.0 mmol/L   CBC with platelets and differential   Result Value Ref Range    WBC Count 17.3 (H) 4.0 - 11.0 10e3/uL    RBC Count 5.38 4.40 - 5.90 10e6/uL    Hemoglobin 16.2 13.3 - 17.7 g/dL    Hematocrit 47.7 40.0 - 53.0 %    MCV 89 78 - 100 fL    MCH 30.1 26.5 - 33.0 pg    MCHC 34.0 31.5 - 36.5 g/dL    RDW 12.7 10.0 - 15.0 %    Platelet Count 201 150 - 450 10e3/uL    % Neutrophils 77 %    % Lymphocytes 8 %    % Monocytes 11 %    % Eosinophils 2 %    % Basophils 0 %    % Immature Granulocytes 2 %    NRBCs per 100 WBC 0 <1 /100    Absolute Neutrophils 13.4 (H) 1.6 - 8.3 10e3/uL    Absolute Lymphocytes 1.3 0.8 - 5.3 10e3/uL    Absolute Monocytes 1.9 (H) 0.0 - 1.3 10e3/uL    Absolute Eosinophils 0.3 0.0 - 0.7 10e3/uL    Absolute Basophils 0.1 0.0 - 0.2 10e3/uL    Absolute Immature Granulocytes 0.3 <=0.4 10e3/uL    Absolute NRBCs 0.0 10e3/uL   Lactic acid whole blood   Result Value Ref [facility]    Lactic Acid 1.1 0.7 - 2.0 mmol/L

## 2023-07-22 NOTE — ED PROVIDER NOTES
History     Chief Complaint   Patient presents with     Post-op Problem     C/o nausea and vomiting. Notes abd pain worse and states unable to keep his pain medication down. States colon surg 2-3 days ago. Notes weakness     HPI  Nash De Leon is a 72 year old male who was admitted on the 17th for colon resection.  Patient has a history of colon cancer has metastasized to the liver.  Surgery went well and he was discharged on the 19th without complication.  He has had 1 day of nausea vomiting not passing gas no bowel movements.  He is unable to keep his pain medication down he is quite uncomfortable at the moment.  No fevers been having some chills  Allergies:  No Known Allergies    Problem List:    Patient Active Problem List    Diagnosis Date Noted     Paralytic ileus (H) 07/23/2023     Priority: Medium     Nausea & vomiting 07/22/2023     Priority: Medium     Nausea with vomiting 07/21/2023     Priority: Medium     Colon cancer metastasized to liver (H) 07/17/2023     Priority: Medium     Coronary artery disease due to lipid rich plaque 01/27/2023     Priority: Medium     Moderate protein malnutrition (H) 06/14/2022     Priority: Medium     Abnormal weight loss 06/11/2022     Priority: Medium     Acute hepatitis 06/11/2022     Priority: Medium     Neutrophilia 06/11/2022     Priority: Medium     Non-traumatic rhabdomyolysis 06/11/2022     Priority: Medium     Productive cough 06/11/2022     Priority: Medium     Kidney stone on left side 05/23/2018     Priority: Medium     Antiplatelet or antithrombotic long-term use 01/10/2018     Priority: Medium     Dryness of eye 05/07/2015     Priority: Medium     Formatting of this note might be different from the original. Updated per 10/1/17 IMO import       Disorder of refraction and accommodation 09/10/2013     Priority: Medium     Formatting of this note might be different from the original. IMO Update       Senile incipient cataract 09/10/2013     Priority: Medium      Dysthymic disorder 05/16/2011     Priority: Medium     Benign prostatic hyperplasia with urinary obstruction 04/26/2011     Priority: Medium     Formatting of this note might be different from the original. IMO Update 10/11 Updated per 10/1/17 IMO import       Meniere's disease 11/15/2004     Priority: Medium     Formatting of this note might be different from the original. Meniere's disease. HIstory dating back to age 6, progressive haring loss left ear, infectious. Vertigo episodes, presure in ear, ringing left ear. Consistent with tetrad of Meniere's. IMO Update 10/11          Past Medical History:    Past Medical History:   Diagnosis Date     Antiplatelet or antithrombotic long-term use      CAD (coronary artery disease)      History of BPH      Meniere's disease      Presence of stent in coronary artery in patient with coronary artery disease      Stented coronary artery        Past Surgical History:    Past Surgical History:   Procedure Laterality Date     CARDIAC SURGERY      Stents     COLECTOMY RIGHT N/A 7/17/2023    Procedure: Extended HEMICOLECTOMY, RIGHT, OPEN Exploratory Laparotony and Liver biopsy;  Surgeon: Bruce Patel MD;  Location: [facility]     COLONOSCOPY       COLONOSCOPY N/A 7/13/2023    Procedure: COLONOSCOPY WITH BIOPSY AND POLYPECTOMY;  Surgeon: Bruce Patel MD;  Location: [facility]     COMBINED CYSTOSCOPY, RETROGRADES, URETEROSCOPY, LASER HOLMIUM LITHOTRIPSY URETER(S), INSERT STENT Left 8/23/2018    Procedure: COMBINED CYSTOSCOPY, RETROGRADES, URETEROSCOPY, LASER HOLMIUM LITHOTRIPSY URETER(S), INSERT STENT;  Cystoscopy, Left Ueteroscopy with Holmium Laser Lithotripsy, Left Stent Placement;  Surgeon: Terrell Forman MD;  [facility]     COMBINED CYSTOSCOPY, RETROGRADES, URETEROSCOPY, LASER HOLMIUM LITHOTRIPSY URETER(S), INSERT STENT Left 9/13/2018    Procedure: COMBINED CYSTOSCOPY, RETROGRADES, URETEROSCOPY, LASER HOLMIUM LITHOTRIPSY URETER(S), INSERT STENT;   "Cystoscopy, Left Ureteroscopy with Stone Basketing, Left Stent Exchange ;  Surgeon: Terrell Forman MD;  Location: [facility]     LAPAROTOMY EXPLORATORY N/A 2023    Procedure: EXPLORATORY LAPAROTOMY, Abdominal Washout;  Surgeon: Elyse Montes MD;  Location: [facility] OR       Family History:    Family History   Problem Relation Age of Onset     Colon Cancer Mother      Alzheimer Disease Father        Social History:  Marital Status:   [2]  Social History     Tobacco Use     Smoking status: Former     Types: Cigarettes     Quit date: 1998     Years since quittin.0     Smokeless tobacco: Never   Substance Use Topics     Alcohol use: No     Drug use: No        Medications:    aspirin 81 MG tablet  HYDROcodone-acetaminophen (NORCO) 5-325 MG tablet  metoprolol succinate ER (TOPROL XL) 25 MG 24 hr tablet  nitroGLYcerin (NITROSTAT) 0.4 MG sublingual tablet  omeprazole (PRILOSEC) 40 MG DR capsule          Review of Systems   All other systems reviewed and are negative.      Physical Exam   BP: 125/90  Pulse: 103  Temp: 98  F (36.7  C)  Resp: 16  Height: 162.6 cm (5' 4\")  Weight: 63.5 kg (139 lb 15.9 oz)  SpO2: 97 %      Physical Exam  Constitutional:       General: He is not in acute distress.     Appearance: Normal appearance. He is normal weight. He is not ill-appearing, toxic-appearing or diaphoretic.   HENT:      Head: Normocephalic and atraumatic.      Right Ear: External ear normal.      Left Ear: External ear normal.   Eyes:      Extraocular Movements: Extraocular movements intact.      Conjunctiva/sclera: Conjunctivae normal.      Pupils: Pupils are equal, round, and reactive to light.   Cardiovascular:      Rate and Rhythm: Regular rhythm. Tachycardia present.      Pulses: Normal pulses.      Heart sounds: Normal heart sounds.   Pulmonary:      Effort: Pulmonary effort is normal. No respiratory distress.   Abdominal:      Tenderness: There is abdominal tenderness.   Musculoskeletal:         " General: Normal range of motion.   Skin:     General: Skin is warm and dry.      Capillary Refill: Capillary refill takes less than 2 seconds.      Coloration: Skin is not jaundiced or pale.   Neurological:      Mental Status: He is alert and oriented to person, place, and time. Mental status is at baseline.      Cranial Nerves: No cranial nerve deficit.   Psychiatric:         Mood and Affect: Mood normal.         ED Course        I have reviewed the epic chart, the nurses note and triage note. vital signs were reviewed.    Lab work is obtained including lactic acid and blood cultures.  IV is established normal saline was given.  Patient's initial lactic acid returned at 2.8 had later normalized to 1.1.  His CRP was elevated at 65.4 which is would be unexpected postsurgical.  CMP shows no significant elevation of liver enzymes renal function normal.  His white count was 17.3 which was an improvement from a couple days before.  Given the postop complication CT abdomen pelvis was obtained.  Per the V rad report there is a 3 by 5 x 3 x 5 cm air-fluid collection seen adjacent to the left liver the lobe concerns for metastasis is well throughout the liver.  Air-fluid filled loops of small bowel likely represent a postop ileus.  There is some free intraperitoneal air consistent with surgery noted.  Small amount ascites within the upper abdomen also noted.  I did consult with Dr. Montes on-call general surgery and asked that he present to the ER to evaluate the patient and review the CT. and will be to admit the patient.  ED Course as of 07/27/23 2010 Fri Jul 21, 2023 2218 RBC Count: 5.38     Procedures         No results found for this or any previous visit (from the past 24 hour(s)).    Medications   0.9% sodium chloride BOLUS (1,000 mLs Intravenous $New Bag 7/21/23 1927)   morphine (PF) injection 2 mg (2 mg Intravenous $Given 7/21/23 1926)   ondansetron (ZOFRAN) injection 4 mg (4 mg Intravenous $Given 7/21/23 1927)    sodium chloride (PF) 0.9% PF flush 50 mL (50 mLs Intravenous $Given 7/21/23 2113)   iopamidol (ISOVUE-370) solution 80 mL (80 mLs Intravenous $Given 7/21/23 2113)   alum & mag hydroxide-simethicone (MAALOX) suspension 15 mL (15 mLs Oral $Given 7/21/23 2149)   0.9% sodium chloride BOLUS (1,000 mLs Intravenous $New Bag 7/21/23 2147)       Assessments & Plan (with Medical Decision Making)     I have reviewed the nursing notes.    I have reviewed the findings, diagnosis, plan and need for follow up with the patient.      Discharge Medication List as of 7/25/2023  3:09 PM      START taking these medications    Details   HYDROcodone-acetaminophen (NORCO) 5-325 MG tablet Take 1 tablet by mouth every 6 hours as needed for pain, Disp-18 tablet, R-0, E-Prescribe      omeprazole (PRILOSEC) 40 MG DR capsule Take 1 capsule (40 mg) by mouth daily, Disp-30 capsule, R-1, E-Prescribe             Final diagnoses:   Postoperative seroma involving digestive system after digestive system procedure       7/21/2023   [facility]     Hussain Singletary PA-C  07/27/23 2010

## 2023-07-22 NOTE — ED NOTES
"\"Here for abdominal pain, nausea and vomiting.  I am dehydrated.  Was recently discharged from the hospital from having colon cancer surgery.\"   "

## 2023-07-22 NOTE — PLAN OF CARE
[facility] Inpatient Admission Note:    Patient admitted to 3228/3228-1 at approximately 0015 via wheel chair accompanied by spouse from emergency room . Report received from CARRINGTON Saldaña in SBAR format at 0000 via telephone. Patient ambulated to bed via self.. Patient is alert and oriented X 3, reports pain; rates at 7 on 0-10 scale.  Patient oriented to room, unit, hourly rounding, and plan of care. Explained admission packet and patient handbook with patient bill of rights brochure. Will continue to monitor and document as needed.     Inpatient Nursing criteria listed below was met:    Health care directives status obtained and documented: Yes    Patient identifies a surrogate decision maker: No If yes, who: NA Contact Information: NA     If initial lactic acid greater than 2.0, repeat lactic acid drawn within one hour of arrival to unit: No. If no, state reason: Resolved prior to admission to unit      Clergy visit ordered if patient requests: N/A    Skin issues/needs documented: Yes    Isolation Patient: no Education given, correct sign in place and documentation row added to PCS:  No    Fall Prevention Yes: Care plan updated, education given and documented, sticker and magnet in place: Yes    Care Plan initiated: Yes    Education Documented (including assessment): Yes    Patient has discharge needs : Yes If yes, please explain: TBD        Pt A&O, vs and assessments as charted, temping in the 99's. C/O abdominal discomfort and pain 7/10. IV zofran given with prn hydrocodone. Pt reported good relief. No emesises this shift. Slept well throughout night. NS @ 100mL. Wife remains at pt bedside entire shift. Bed locked and low, call light in reach, calls appropriately.       Face to face report given with opportunity to observe patient.    Report given to CARRINGTON Germain RN   7/22/2023  6:57 AM

## 2023-07-23 PROBLEM — K91.872 POSTOPERATIVE SEROMA INVOLVING DIGESTIVE SYSTEM AFTER DIGESTIVE SYSTEM PROCEDURE: Status: RESOLVED | Noted: 2023-01-01 | Resolved: 2023-01-01

## 2023-07-23 PROBLEM — I25.118 CORONARY ARTERY DISEASE OF NATIVE ARTERY OF NATIVE HEART WITH STABLE ANGINA PECTORIS (H): Status: ACTIVE | Noted: 2017-12-26

## 2023-07-23 PROBLEM — K56.0 PARALYTIC ILEUS (H): Status: ACTIVE | Noted: 2023-01-01

## 2023-07-23 PROBLEM — I25.118 CORONARY ARTERY DISEASE OF NATIVE ARTERY OF NATIVE HEART WITH STABLE ANGINA PECTORIS (H): Status: RESOLVED | Noted: 2017-12-26 | Resolved: 2023-01-01

## 2023-07-23 NOTE — OR NURSING
Patient awake, states pain under control. Dressing to abdomen intact. Transfered to medical floor, report to Otf SHULTZ

## 2023-07-23 NOTE — ANESTHESIA PREPROCEDURE EVALUATION
Anesthesia Pre-Procedure Evaluation    Patient: Nash De Leon   MRN: [id number] : 1950        Procedure : * No procedures listed *          Past Medical History:   Diagnosis Date     Antiplatelet or antithrombotic long-term use      CAD (coronary artery disease)      History of BPH      Meniere's disease      Presence of stent in coronary artery in patient with coronary artery disease      Stented coronary artery       Past Surgical History:   Procedure Laterality Date     CARDIAC SURGERY      Stents     COLECTOMY RIGHT N/A 2023    Procedure: Extended HEMICOLECTOMY, RIGHT, OPEN Exploratory Laparotony and Liver biopsy;  Surgeon: Bruce Patel MD;  Location: [facility] OR     COLONOSCOPY       COLONOSCOPY N/A 2023    Procedure: COLONOSCOPY WITH BIOPSY AND POLYPECTOMY;  Surgeon: Bruce Patel MD;  Location: [facility] OR     COMBINED CYSTOSCOPY, RETROGRADES, URETEROSCOPY, LASER HOLMIUM LITHOTRIPSY URETER(S), INSERT STENT Left 2018    Procedure: COMBINED CYSTOSCOPY, RETROGRADES, URETEROSCOPY, LASER HOLMIUM LITHOTRIPSY URETER(S), INSERT STENT;  Cystoscopy, Left Ueteroscopy with Holmium Laser Lithotripsy, Left Stent Placement;  Surgeon: Terrell Forman MD;  Location: [facility] OR     COMBINED CYSTOSCOPY, RETROGRADES, URETEROSCOPY, LASER HOLMIUM LITHOTRIPSY URETER(S), INSERT STENT Left 2018    Procedure: COMBINED CYSTOSCOPY, RETROGRADES, URETEROSCOPY, LASER HOLMIUM LITHOTRIPSY URETER(S), INSERT STENT;  Cystoscopy, Left Ureteroscopy with Stone Basketing, Left Stent Exchange ;  Surgeon: Terrell Forman MD;  Location: [facility] OR      No Known Allergies   Social History     Tobacco Use     Smoking status: Former     Types: Cigarettes     Quit date: 1998     Years since quittin.0     Smokeless tobacco: Never   Substance Use Topics     Alcohol use: No      Wt Readings from Last 1 Encounters:   23 63.5 kg (139 lb 15.9 oz)        Anesthesia Evaluation   Pt has had prior anesthetic.  Type: MAC and General.    No history of anesthetic complications       ROS/MED HX  ENT/Pulmonary: Comment: Menieres    (+) PARMINDER risk factors, hypertension, tobacco use, Past use,     Neurologic:     (+) peripheral neuropathy,     Cardiovascular:     (+) hypertension--CAD --stent-2019. 2 Taking blood thinners Pt has not received instructions: Previous cardiac testing   Echo: Date: Results:    Stress Test: Date: Results:    ECG Reviewed: Date: 2018 Results:  NSR  Cath: Date: Results:      METS/Exercise Tolerance: >4 METS    Hematologic:  - neg hematologic  ROS     Musculoskeletal:  - neg musculoskeletal ROS     GI/Hepatic: Comment: Liver nodules    Free air abdomen    (+) GERD, Asymptomatic on medication, bowel prep,     Renal/Genitourinary:     (+) Nephrolithiasis , BPH,     Endo:  - neg endo ROS     Psychiatric/Substance Use:       Infectious Disease:  - neg infectious disease ROS     Malignancy:  - neg malignancy ROS     Other:  - neg other ROS          Physical Exam    Airway        Mallampati: III   TM distance: > 3 FB   Neck ROM: full   Mouth opening: < 3 cm    Respiratory Devices and Support         Dental       (+) Minor Abnormalities - some fillings, tiny chips      Cardiovascular          Rhythm and rate: regular and normal     Pulmonary           breath sounds clear to auscultation           OUTSIDE LABS:  CBC:   Lab Results   Component Value Date    WBC 18.9 (H) 07/23/2023    WBC 16.6 (H) 07/22/2023    HGB 13.1 (L) 07/23/2023    HGB 14.3 07/22/2023    HCT 37.9 (L) 07/23/2023    HCT 42.3 07/22/2023     07/23/2023     07/22/2023     BMP:   Lab Results   Component Value Date     07/23/2023     07/22/2023    POTASSIUM 3.7 07/23/2023    POTASSIUM 4.1 07/22/2023    CHLORIDE 103 07/23/2023    CHLORIDE 101 07/22/2023    CO2 19 (L) 07/23/2023    CO2 23 07/22/2023    BUN 17.3 07/23/2023    BUN 15.1 07/22/2023    CR 0.69 07/23/2023    CR 0.78 07/22/2023     (H) 07/23/2023      (H) 07/22/2023     COAGS: No results found for: PTT, INR, FIBR  POC:   Lab Results   Component Value Date    BGM 91 09/13/2018     HEPATIC:   Lab Results   Component Value Date    ALBUMIN 3.2 (L) 07/22/2023    PROTTOTAL 5.6 (L) 07/22/2023    ALT 21 07/22/2023    AST 22 07/22/2023    ALKPHOS 93 07/22/2023    BILITOTAL 0.6 07/22/2023     OTHER:   Lab Results   Component Value Date    LACT 1.6 07/22/2023    A1C 5.7 12/18/2017    ABRAHAM 8.2 (L) 07/23/2023    LIPASE 28 07/21/2023       Anesthesia Plan    ASA Status:  3, emergent    NPO Status:  ELEVATED Aspiration Risk/Unknown (apple juice a few ounces around 0700)    Anesthesia Type: General.     - Airway: ETT   Induction: RSI, Intravenous.   Maintenance: Balanced.        Consents    Anesthesia Plan(s) and associated risks, benefits, and realistic alternatives discussed. Questions answered and patient/representative(s) expressed understanding.    - Discussed:     - Discussed with:  Patient      - Extended Intubation/Ventilatory Support Discussed: No.      - Patient is DNR/DNI Status: No    Use of blood products discussed: No .     Postoperative Care            Comments:    Other Comments: Discussed risks and benefits with patient for general anesthesia including sore throat, nausea, vomiting, aspiration, dental damage, loss of airway, CV complications, stroke, MI, death. Pt wishes to proceed.             MARYAM ANDERSON CRNA

## 2023-07-23 NOTE — ANESTHESIA PROCEDURE NOTES
Airway       Patient location during procedure: OR       Procedure Start/Stop Times: 7/23/2023 11:36 AM  Staff -        CRNA: Consuelo Hardy APRN CRNA       Performed By: CRNA  Consent for Airway        Urgency: elective  Indications and Patient Condition       Indications for airway management: polo-procedural and airway protection       Induction type:intravenous       Mask difficulty assessment: 1 - vent by mask    Final Airway Details       Final airway type: endotracheal airway       Successful airway: ETT - single  Endotracheal Airway Details        ETT size (mm): 7.5       Cuffed: yes       Cuff volume (mL): 5       Successful intubation technique: direct laryngoscopy       DL Blade Type: Mckeon 2       Grade View of Cords: 1       Adjucts: stylet       Position: Right       Measured from: gums/teeth       Secured at (cm): 22       Bite block used: None    Post intubation assessment        Placement verified by: capnometry, equal breath sounds and chest rise        Number of attempts at approach: 1       Secured with: plastic tape       Ease of procedure: easy       Dentition: Intact    Medication(s) Administered   Medication Administration Time: 7/23/2023 11:36 AM

## 2023-07-23 NOTE — OP NOTE
.REPORT OF OPERATION  DATE OF PROCEDURE: 7/23/2023    PATIENT: Nash De Leon    SURGERY PERFORMED: Exploratory laparotomy with abdominal washout    PREOPERATIVE DIAGNOSIS: Persistent free air, leukocytosis, nausea vomiting following extended right hemicolectomy for stage IV colon cancer    POSTOPERATIVE DIAGNOSIS: No evidence of anastomotic leak, no explanation of persistent free air, normal, postoperative findings    SURGEON: Elyse Montes MD    ASSISTANTS: Dr. Bruce Patel MD    ANESTHESIA: General Endotracheal Anesthesia, Exparel supplementation    COMPLICATIONS: None apparent    TRANSFUSIONS: None    TISSUE TO PATHOLOGY: None    FINDINGS: The bowel was run, we reevaluated the anastomosis multiple times both anterior and posterior sides.  We reevaluated the lesser sac of the gastric lumen, we also evaluated the liver biopsy site in the pelvis.  There is no evidence of anastomotic leak, succus, peritonitis.  Essentially normal postoperative abdomen without evidence of complications    INDICATIONS: This is a 72 year old male who 6 days ago underwent laparotomy, extended right hemicolectomy, liver biopsy for stage IV adenocarcinoma of the colon.  Postoperative day #2 he was discharged to home tolerating diet and passing flatus.  However, Friday evening presented to the emergency department with nausea, vomiting and abdominal distention.  He was found to have standard postoperative changes on CT scan in addition to larger amount than normal air in the abdomen.  Patient did have a leukocytosis.  Today, repeat x-ray showed still large amounts of free air in the abdomen compared to normal.  His abdomen was still benign however, he had nausea and vomiting in the middle the night as well.  Due to the increasing leukocytosis and persistent free air we agreed that we should reexplore the patient.    DESCRIPTIONS OF PROCEDURE IN DETAIL: After consent was obtained the patient was taken to the operative suite and dasha in  the supine position.  The patient was identified and the correct patient was confirmed.  General Endotracheal Anesthesia was administered by anesthesia.  The patient was sterilely prepped and draped in the usual fashion.  A time out was performed verifying the correct patient and the correct procedure.  The entire operative team was in agreement.  All necessary equipment and supplies were in the room.  Staples were removed and previous midline incision was opened.  Upon entering the abdomen there is no evidence of succus, there is no evidence of hematomas or active bleeding.  We reevaluated the anastomosis both anterior posteriorly.  The lesser sac was also easily identified there is no evidence of gastrotomy or injury to the anterior posterior wall of the stomach.  NG tube was in good position.  We then evaluated the pelvis and there is no evidence of purulence or stool in the pelvis.  We irrigated the abdomen with sterile saline.  At the conclusion of the procedure we could not find an etiology for the persistent air.  The abdomen was closed using looped #1 PDS.  Staples were placed on the skin.  We did dilute 20 mL of Exparel with 20 mL of saline and infiltrated this in the skin, subcutaneous tissue and fascia.  Patient tolerated procedure well, was extubated table and transferred to the recovery room in stable condition.

## 2023-07-23 NOTE — PLAN OF CARE
"Goal Outcome Evaluation:       Reason for hospital stay:  nausea with vomitting  Living situation PTA: home  Most recent vitals: /82   Pulse 73   Temp 98.8  F (37.1  C) (Tympanic)   Resp 16   Ht 1.626 m (5' 4\")   Wt 63.5 kg (139 lb 15.9 oz)   SpO2 94%   BMI 24.03 kg/m      Pain Management:  none  LOC:  A&O   Cardiac:  apical regular   Respiratory:  rhonchi in lungs - cleared with coughing. Room air  GI:  midline ABD incision covered with Aquacel dressing. Bowel sounds audible   :  huynh cath in place  Skin Issues:  mid line abd incision     IVF:  NS 125ml/hr  ABX:  rocephin     Nutrition: NPO   ADL's:  assist 1  Ambulation:assist 1  Safety:  call light in place, bed in low position with wheels locked, room door open, fall alarm on       Comments: pt did leave floor to go to surgery today. VSS, afebrile pain managed after surgery       7/23/2023  3:32 PM  Otf Duvall RN                   "

## 2023-07-23 NOTE — ANESTHESIA POSTPROCEDURE EVALUATION
Patient: Nash De Leon    Procedure: Procedure(s):  EXPLORATORY LAPAROTOMY, Abdominal Washout       Anesthesia Type:  General    Note:  Disposition: Inpatient   Postop Pain Control: Uneventful            Sign Out: Well controlled pain   PONV: No   Neuro/Psych: Uneventful            Sign Out: Acceptable/Baseline neuro status   Airway/Respiratory: Uneventful            Sign Out: Acceptable/Baseline resp. status   CV/Hemodynamics: Uneventful            Sign Out: Acceptable CV status; No obvious hypovolemia; No obvious fluid overload   Other NRE: NONE   DID A NON-ROUTINE EVENT OCCUR? No           Last vitals:  Vitals Value Taken Time   /84 07/23/23 1431   Temp 98.8  F (37.1  C) 07/23/23 1417   Pulse 77 07/23/23 1431   Resp 16 07/23/23 1417   SpO2 95 % 07/23/23 1438   Vitals shown include unvalidated device data.    Electronically Signed By: MARYAM ANDERSON CRNA  July 23, 2023  2:39 PM

## 2023-07-23 NOTE — PROGRESS NOTES
.  INPATIENT ROUNDING NOTE  7/23/2023    Patient: Nash De Leon    Physician of Record: Elyse Montes MD    Admitting diagnosis:   Nausea & vomiting [R11.2]    Current Diet: NPO    CURRENT MEDICATIONS:  Continuous Medications:  Current Facility-Administered Medications   Medication Last Rate     sodium chloride 125 mL/hr at 07/23/23 0756       Scheduled Medications:  Current Facility-Administered Medications   Medication Dose Route Frequency     cefTRIAXone  1 g Intravenous Q24H     enoxaparin ANTICOAGULANT  40 mg Subcutaneous Q24H     metoprolol succinate ER  12.5 mg Oral QAM     sodium chloride (PF)  3 mL Intracatheter Q8H       PRN Medications:  Current Facility-Administered Medications   Medication Dose Route Frequency     HYDROcodone-acetaminophen  1 tablet Oral Q6H PRN     lidocaine 4%   Topical Q1H PRN     lidocaine (buffered or not buffered)  0.1-1 mL Other Q1H PRN     melatonin  1 mg Oral At Bedtime PRN     naloxone  0.2 mg Intravenous Q2 Min PRN    Or     naloxone  0.4 mg Intravenous Q2 Min PRN    Or     naloxone  0.2 mg Intramuscular Q2 Min PRN    Or     naloxone  0.4 mg Intramuscular Q2 Min PRN     nitroGLYcerin  0.4 mg Sublingual Q5 Min PRN     ondansetron  4 mg Oral Q6H PRN    Or     ondansetron  4 mg Intravenous Q6H PRN     prochlorperazine  5 mg Intravenous Q6H PRN    Or     prochlorperazine  5 mg Oral Q6H PRN    Or     prochlorperazine  12.5 mg Rectal Q12H PRN     sodium chloride (PF)  3 mL Intracatheter q1 min prn       SUBJECTIVE:   Nausea: Yes. Vomiting: Yes. Fever: No. Chills: No.  Flatus: Yes. BM: No. Pain is 3/10. Pain control: good. Tolerating current diet: No.  Patient had a decent day yesterday however, he has not had improvements in his anorexia.  Patient is not hungry and not willing to take anything orally at this point.  He did pass some flatus through the night and this morning.  He still has not had a bowel movement which is not surprising.  He does not feel any worse than he did  "yesterday.  He is able to be up and ambulating in the halls.  His wife did leave yesterday and go home and she will return to the hospital today sometime.    PHYSICAL EXAM:   Vital signs: /92   Pulse 81   Temp 98.7  F (37.1  C) (Tympanic)   Resp 18   Ht 1.626 m (5' 4\")   Wt 63.5 kg (139 lb 15.9 oz)   SpO2 95%   BMI 24.03 kg/m     Weight: [unfilled]   BMI: Body mass index is 24.03 kg/m .   General: in no acute distress, alert   Lungs: clear to auscultation   CV: Regular rate and rhythm without murmer   Abdominal: Abdomen soft, non-tender. BS normal. No masses, organomegaly, active bowel sounds, no guarding or rebound.  There is no tympany or peritoneal findings.   Wound: Closed wound. Clean, dry and intact. Healing well.       INPUT/OUTPUT:      Intake/Output Summary (Last 24 hours) at 7/23/2023 0810  Last data filed at 7/23/2023 0559  Gross per 24 hour   Intake 2882 ml   Output 1250 ml   Net 1632 ml       I/O last 3 completed shifts:  In: 2882 [I.V.:2882]  Out: 1250 [Urine:1100; Emesis/NG output:150]    LABS:    Last CBC Rrsults:   Recent Labs   Lab Test 07/23/23  0514 07/22/23  0516 07/21/23  1849   WBC 18.9* 16.6* 17.3*   RBC 4.29* 4.73 5.38   HGB 13.1* 14.3 16.2   HCT 37.9* 42.3 47.7   MCV 88 89 89   MCH 30.5 30.2 30.1   MCHC 34.6 33.8 34.0   RDW 12.6 12.7 12.7    170 201       Last Comprehensive Metabolic panel:  Recent Labs   Lab Test 07/23/23  0514 07/22/23  0516 07/21/23  1849 07/19/23  0506    136 134* 135*   POTASSIUM 3.7 4.1 4.2 4.2   CHLORIDE 103 101 94* 101   CO2 19* 23 26 23   ANIONGAP 14 12 14 11   * 119* 129* 137*   BUN 17.3 15.1 16.0 15.6   CR 0.69 0.78 0.90 0.78   GFRESTIMATED >90 >90 90 >90   ABRAHAM 8.2* 8.6* 9.9 8.8   BILITOTAL  --  0.6 0.7 0.4   ALKPHOS  --  93 113 56   ALT  --  21 29 34   AST  --  22 29 40       Recent Labs   Lab Test 07/22/23  0516 07/21/23  1849 07/19/23  0506   ALBUMIN 3.2* 3.9 3.0*       RADIOLOGY:  X-ray studies show small amount of free air in " the upper abdomen.  This does not look to be increased.  There is no large gastric bubble therefore we will hold off on NG tube at this point.  Patient does have air in the colon as well as the small bowel.    ASSESSMENT:  Postoperative ileus, postoperative nausea vomiting, history of stage IV colon cancer metastasized to the liver.    PLAN: I am going to add antibiotics to the patient's regimen today.  He did have a little bit of increase in his white blood cell count.  We will continue both mechanical and chemical DVT prophylaxis.  We will continue to wait and watch to see if the patient improves or worsens.  If he does not improve by tomorrow then, a CT scan of the abdomen pelvis should be considered for reevaluation.  We will continue to monitor.  No plans on advancing the diet at this point.  Encourage ambulation.    Addendum: After review of the x-rays and discussion with the radiologist, both he and I are concerned amount of free air that the patient still has in the abdomen 6 days later.  I have discussed the case with the patient as well.  Discussed with him the importance of exploratory laparotomy versus waiting and watching at this point.  I also discussed the case with the surgeon of record.  The patient now after further discussion has decided to agreed to exploratory laparotomy.  We will plan to into the abdomen, possible irrigation possible takedown of the anastomosis and possible ileostomy.  Patient understands all the potential goals of the procedure as well as the potential risk complications of procedure including, but not limited to wound infection, fascial dehiscence, need for repeat surgery in the future, the patient understands and would like to proceed.

## 2023-07-23 NOTE — PHARMACY
[facility]    Pharmacy      Antimicrobial Stewardship Note     Current antimicrobial therapy:        Culture Results:         Recommendations/Interventions:  1. If anastomotic leak is suspected, broad spectrum antibiotics should be used.  Would recommend changing Rocephin to something broader, like Zosyn, for better anaerobic coverage.      Dayana Jasso, [facility]  July 23, 2023

## 2023-07-23 NOTE — PROGRESS NOTES
"CLINICAL NUTRITION SERVICES  -  ASSESSMENT NOTE    Nash De Leon : Admission Nutrition Risk Screen - 14-23lb weight loss, reduced appetite/intake    72 yom admitted for postoperative ileus, dehydration, nausea, vomiting. Medical hx includes CAD, tobacco use, moderate malnutrition ([facility] admission 6/2023). S/p right hemicolectomy (7/17/23) related to colon cancer. Weight loss of 10lbs in the last month. Per chart review, poor appetite with reduced intake for at least the past 2 months.     Diet Order: Clear liquids  Intake: no meals    Height: 5' 4\"  Weight: 139 lbs 15.87 oz  Body mass index is 24.03 kg/m .  Weight Status:  Normal BMI  Weight History: 10lb (6%) weight loss in 1 month  Wt Readings from Last 10 Encounters:   07/22/23 63.5 kg (139 lb 15.9 oz)   07/17/23 64.4 kg (142 lb)   07/13/23 64.4 kg (142 lb)   07/12/23 65.8 kg (145 lb)   06/28/23 68.5 kg (151 lb)   06/25/23 67.6 kg (149 lb)   11/20/18 71.4 kg (157 lb 6.4 oz)   09/13/18 71.9 kg (158 lb 8.2 oz)   08/23/18 71 kg (156 lb 8.4 oz)   07/24/18 72.3 kg (159 lb 6.4 oz)        Weight used to calculate needs: 63.5kg current weight  Estimated Energy Needs: 0754-7933 kcals (30-35 Kcal/Kg)   Estimated Protein Needs: 75- 95 grams protein (1.2-1.5 g pro/Kg)  Estimated Fluid Needs: 9301-9757  mL (1 mL/Kcal)    MALNUTRITION:  % Weight Loss:  > 5% in 1 month (severe malnutrition)  % Intake:  </= 75% for >/= 1 month (severe malnutrition)  Muscle and Subcutaneous Fat Loss:  unable to assess. [facility] admission 6/2023- RD noted mild loss of muscle and subcutaneous fat    Malnutrition Diagnosis: Severe malnutrition  In Context of:  Chronic illness or disease    NUTRITION RECOMMENDATIONS  - Advance diet as tolerated  - Offer Ensure clear. Offer Ensure Plus once diet advances.    MONITORING AND EVALUATION:  RD will monitor intake, weight, labs      "

## 2023-07-24 NOTE — MEDICATION SCRIBE - ADMISSION MEDICATION HISTORY
Medication Scribe Admission Medication History    Admission medication history is complete. The information provided in this note is only as accurate as the sources available at the time of the update.    Medication reconciliation/reorder completed by provider prior to medication history? Yes    Information Source(s): Patient and CareEverywhere/SureScripts via in-person    Pertinent Information:   Patient manages his own medications and is a good historian.   Reports that he started no new medications in the short duration he was home after last admission.     Changes made to PTA medication list:    Added: None    Deleted: norco- pt reports completed therapy    Changed: None    Medication Affordability:  Not including over the counter (OTC) medications, was there a time in the past 3 months when you did not take your medications as prescribed because of cost?: No    Allergies reviewed with patient and updates made in EHR: yes- NKDA    Medication History Completed By: Jessika Sanchez 7/24/2023 8:39 AM    Prior to Admission medications    Medication Sig Last Dose Taking? Auth Provider Long Term End Date   aspirin 81 MG tablet Take 81 mg by mouth every morning Past Week Yes Reported, Patient     metoprolol succinate ER (TOPROL XL) 25 MG 24 hr tablet Take 12.5 mg by mouth every morning Past Week Yes Reported, Patient No    nitroGLYcerin (NITROSTAT) 0.4 MG sublingual tablet Place 0.4 mg under the tongue every 5 minutes as needed for Chest pain. Do not crush; maximum of 3 doses in 15 minutes. Unknown Yes Reported, Patient Yes

## 2023-07-24 NOTE — H&P
"INPATIENT ROUNDING NOTE  7/24/2023    Patient: Nash STAPLES Moises    Physician of Record: Locums    Admitting diagnosis: Postoperative seroma involving digestive system after digestive system procedure [K91.872]  Nausea & vomiting [R11.2]    Procedure(s):  EXPLORATORY LAPAROTOMY, Abdominal Washout     POD: 1 Day Post-Op    Current Diet: NPO    CURRENT MEDICATIONS:  Continuous Medications:  Current Facility-Administered Medications   Medication Last Rate     sodium chloride 125 mL/hr at 07/24/23 1215       Scheduled Medications:  Current Facility-Administered Medications   Medication Dose Route Frequency     cefTRIAXone  1 g Intravenous Q24H     enoxaparin ANTICOAGULANT  40 mg Subcutaneous Q24H     sodium chloride (PF)  3 mL Intracatheter Q8H       PRN Medications:  Current Facility-Administered Medications   Medication Dose Route Frequency     HYDROmorphone  0.5 mg Intravenous Q2H PRN     lidocaine 4%   Topical Q1H PRN     lidocaine (buffered or not buffered)  0.1-1 mL Other Q1H PRN     melatonin  1 mg Oral At Bedtime PRN     naloxone  0.2 mg Intravenous Q2 Min PRN    Or     naloxone  0.4 mg Intravenous Q2 Min PRN    Or     naloxone  0.2 mg Intramuscular Q2 Min PRN    Or     naloxone  0.4 mg Intramuscular Q2 Min PRN     nitroGLYcerin  0.4 mg Sublingual Q5 Min PRN     ondansetron  4 mg Oral Q6H PRN    Or     ondansetron  4 mg Intravenous Q6H PRN     prochlorperazine  5 mg Intravenous Q6H PRN    Or     prochlorperazine  5 mg Oral Q6H PRN    Or     prochlorperazine  12.5 mg Rectal Q12H PRN     sodium chloride (PF)  3 mL Intracatheter q1 min prn       SUBJECTIVE:   Nausea: No. Vomiting: No. Fever: No. Chills: No. Excessive burping: No. Flatus: Yes. BM: Yes. Pain control: good. Tolerating current diet: Yes.      PHYSICAL EXAM:   Vital signs: /98 (BP Location: Right arm, Patient Position: Semi-Estevez's, Cuff Size: Adult Regular)   Pulse 99   Temp 99.3  F (37.4  C) (Tympanic)   Resp 18   Ht 1.626 m (5' 4\")   Wt " 63.5 kg (139 lb 15.9 oz)   SpO2 94%   BMI 24.03 kg/m     BMI: Body mass index is 24.03 kg/m .   General: Normal, healthy, cooperative, in no acute distress, alert   Lungs: respirations are non-labored   Abdominal: non-distended   Wound: dressing intact   Extremities: No cyanosis, clubbing or edema noted bilaterally in Upper and Lower Extremities   Neurological: without deficit    INPUT/OUTPUT:      Intake/Output Summary (Last 24 hours) at 7/24/2023 1409  Last data filed at 7/24/2023 1302  Gross per 24 hour   Intake 4035 ml   Output 1260 ml   Net 2775 ml       I/O last 3 completed shifts:  In: 4032 [I.V.:4032]  Out: 2160 [Urine:1560; Emesis/NG output:475; Other:100; Blood:25]    LABS:    Last CBC Rrsults:   Recent Labs   Lab Test 07/24/23  0542 07/23/23  0514 07/22/23  0516   WBC 24.2* 18.9* 16.6*   RBC 3.75* 4.29* 4.73   HGB 11.4* 13.1* 14.3   HCT 33.3* 37.9* 42.3   MCV 89 88 89   MCH 30.4 30.5 30.2   MCHC 34.2 34.6 33.8   RDW 13.0 12.6 12.7    171 170       Last Comprehensive Metabolic panel:  Recent Labs   Lab Test 07/24/23  0542 07/23/23  0514 07/22/23  0516 07/21/23  1849    136 136 134*   POTASSIUM 4.0 3.7 4.1 4.2   CHLORIDE 104 103 101 94*   CO2 21* 19* 23 26   ANIONGAP 13 14 12 14   * 106* 119* 129*   BUN 24.6* 17.3 15.1 16.0   CR 0.79 0.69 0.78 0.90   GFRESTIMATED >90 >90 >90 90   ABRAHAM 8.2* 8.2* 8.6* 9.9   BILITOTAL 0.4  --  0.6 0.7   ALKPHOS 74  --  93 113   ALT 13  --  21 29   AST 16  --  22 29       Recent Labs   Lab Test 07/24/23  0542 07/22/23  0516 07/21/23  1849   ALBUMIN 2.7* 3.2* 3.9       ASSESSMENT:    1 Day Post-Op from Procedure(s):  EXPLORATORY LAPAROTOMY, Abdominal Washout.      PLAN:   Discontinue NG  Discontinue huynh   Up ambulating

## 2023-07-24 NOTE — PROGRESS NOTES
Met karma/Nash. No needs voiced at this time.Medical record and Roland Score reviewed. Participated in interdisciplinary rounds.  No apparent needs noted at this time. Care Transitions will remain available if needs arise.

## 2023-07-24 NOTE — PHARMACY
Pharmacy Antimicrobial Stewardship Assessment     Current Antimicrobial Therapy:  Anti-infectives (From now, onward)      Start     Dose/Rate Route Frequency Ordered Stop    23 0830  cefTRIAXone in d5w (ROCEPHIN) intermittent infusion 1 g         1 g  over 30 Minutes Intravenous EVERY 24 HOURS 23 0807              Indication: Possible post-op infection    Days of Therapy: 2     Pertinent Labs:  Recent Labs   Lab Test 23  0542 23  0514 23  0516   WBC 24.2* 18.9* 16.6*     Recent Labs   Lab Test 23  0543 23  2158   LACT 1.6 1.1        Temperature:  Temp (24hrs), Av.5  F (36.9  C), Min:97.6  F (36.4  C), Max:99.5  F (37.5  C)    Culture Results:       Recommendations/Interventions:  1. Per surgeon, no evidence of anastomotic leak, succus, or peritonitis but continual increase in WBC count. No recommendations/interventions at this time.     Alesha Lemons  2023

## 2023-07-24 NOTE — PLAN OF CARE
Pt A&O this shift x 4. Temperature 99.3 this shift. Pt remains on room air this shift.    Bowel sounds normoactive this shift in all 4 quadrants.   Abdomen is tender. Dressing to midline remains clean, dry, and intact.   Pt complains of pain to abdomen, and receives IV dilaudid x 1 this shift. Pt denies nausea at this time.  Pt reports pain medication helpful.  Pt has 1 small black stool this shift.   14:40 NG tube, and huynh catheter removed removed per orders.  NG Cannister removed  with watery brown output this shift as charted.(See flowsheets).     Pt standby assist transferring to chair this shift.     Face to face report given with opportunity to observe patient.    Report given to CARRINGTON Germain.     Linda Villanueva RN   7/24/2023  3:49 PM

## 2023-07-24 NOTE — PLAN OF CARE
Pt alert and oriented x3, calm and cooperative. VS and assessment as charted. Rates pain 3/10 to abd and rec'd Dilaudid per MAR with effectiveness. HRR tachy low 100's. Chavez with marginal urine output- clear zoie in color. IV infusing /hr, remains NPO, NG 70cm at the nares with brown returns. Midline abd incision with dressing CDI, bowel sounds faint. Independently positions self in bed. Free from falls/ injury, call light within reach, alarms activated.     Face to face report given with opportunity to observe patient.    Report given to CARRINGTON Castro RN   7/24/2023  7:05 AM

## 2023-07-24 NOTE — PLAN OF CARE
Returned from surg on 7-3 shift. Exp lap with washout. S/P R)  dinh colectomy 6 days ago.  Assess as charted. A&O. Med X 2 this shift with 0.5mg IV Dilaudid- good pain relief. Denies nausea. NG to LIS. IVF: NS @ 125/hr. Enc to use IS. Feels he shouldn't use as abd is sore. Re-educated on importance of using IS to help prevent post op pneumonia. Has been using indep. Chavez intact. SCD's on. Dressing CDI.     Face to face report given with opportunity to observe patient.    Report given to Elena Abrams RN   7/24/2023  12:00 AM

## 2023-07-24 NOTE — PLAN OF CARE
Goal Outcome Evaluation:         A&O, VSS, afebrile. C/o mild pain to medial incision. Denied need for pain medication at this time. LS clear. Remains on RA. HRR. BS active. Midline dressing CDI. NG tube remains to low-intermittent suction 70cm @ nare. Denied nausea.  mL/hr. IV Rocephin continues. Call light within reach. Remains NPO at this time.    Face to face report given with opportunity to observe patient.    Report given to CARRINGTON Holt RN   7/24/2023  11:00 AM

## 2023-07-25 NOTE — DISCHARGE SUMMARY
INPATIENT DISCHARGE SUMMARY  7/25/2023    Patient'S Name: Nash De Leon    Admitting Physician of Record: [facility]    Discharging Physician:  Zina Giraldo NP    Date of admission: 7/21/2023     Date of discharge: 7/25/2023    Admitting diagnosis: Postoperative seroma involving digestive system after digestive system procedure [K91.872]  Nausea & vomiting [R11.2]    Discharge diagnosis: Same    Procedures: Procedure(s):  EXPLORATORY LAPAROTOMY, Abdominal Washout    Consultants: None    Hospital course: The patient was admitted to the hospital and taken to the operating room and underwent an Procedure(s):  EXPLORATORY LAPAROTOMY, Abdominal Washout.  The patient tolerated the procedure(s) well and was transferred to the avendaño.  His postoperative course has been completely unremarkable.  At the time of discharge he is eating a Regular diet, is having good pain control on oral medications, and is passing gas and is having bowel movements.  The patient will be discharged home in good condition.    Discharge instructions include:    Patient will be discharged to Home   Diet:   Active Diet and Nourishment Order   Procedures    Regular Diet Adult      Activity : no liting over 10 pounds for 6 weeks   Follow-up:     The patient will follow up with his primary care provider in 1 weeks.      The patient will follow up with me in 1 weeks.   Medications include:    All prior medications

## 2023-07-25 NOTE — PLAN OF CARE
POD#1. Assess as charted.  Free from falls/injuries. Amb in padilla . Gait steady . Diet: clears. Reports ' indigestion' from popsicle. Med with IV Compazine. Denies need for pain med. Dressing to midline CDI. Chavez out at 1430. Instructed to save urine in urinal but voided into toilet. Later voided into urinal 50cc. Reports good relief of indigestion from Compazine. Amb around unit 1X this shift. Face to face report given with opportunity to observe patient.    Report given to Elena Abrams RN   7/24/2023  11:25 PM

## 2023-07-25 NOTE — CARE PLAN
Patient discharged at 3:54 PM via wheel chair accompanied by spouse and staff. Prescriptions sent to patients preferred pharmacy. All belongings sent with patient.     Discharge instructions reviewed with patient and spouse. Listed belongings gathered and returned to patient. Clothing, glasses, cell phone and . Patient reported all belongings were accounted for prior to discharge.     Patient discharged to home.     Surgical Patient   Surgical Procedures during stay: Exploratory laparotomy with abdominal wash out.   Did patient receive discharge instruction on wound care and recognition of infection symptoms? Yes    MISC  Follow up appointment made:  Yes  Home medications returned to patient: N/A  Patient reports pain was well managed at discharge: Yes    Patient A&Ox4, up ad pato in room or if SBA was needed patient would call appropriately. Urinal voiding with no issue reported after huynh removal. Had a BMx2. Dressing to midline incision, CDI. Fair appetite for breakfast and lunch. Denied nausea but did report mild indigestion. Bowel sounds active. Abdomen, soft, nontender to touch. Umbilicus area was firm, nontender. Lung sounds clear. IV removed. Patient and spouse had no further questions.

## 2023-07-25 NOTE — PHARMACY
Pharmacy Antimicrobial Stewardship Assessment     Current Antimicrobial Therapy:  Anti-infectives (From now, onward)      Start     Dose/Rate Route Frequency Ordered Stop    23 0830  cefTRIAXone in d5w (ROCEPHIN) intermittent infusion 1 g         1 g  over 30 Minutes Intravenous EVERY 24 HOURS 23 0807              Indication: possible post-op infection    Days of Therapy: 3     Pertinent Labs:  Recent Labs   Lab Test 23  0542 23  0514 23  0516   WBC 24.2* 18.9* 16.6*     Recent Labs   Lab Test 23  0543 23  2158   LACT 1.6 1.1      Temperature:  Temp (24hrs), Av.3  F (37.4  C), Min:98.4  F (36.9  C), Max:99.8  F (37.7  C)    Culture Results:       Recommendations/Interventions:  1. WBC count continues to increase. Could consider broadening antimicrobial coverage if infection is suspected.    Alesha Lemons  2023

## 2023-07-25 NOTE — PLAN OF CARE
Pt alert and oriented x3, calm and cooperative. VS and assessment as charted. Rates pain 4/10 to abd and rec'd Dilaudid per MAR with effectiveness. HRR. Using urinal at bedside and voiding clear zoie urine in adequate amounts. IV infusing /hr, Clear liquid diet. Midline abd incision with dressing CDI, bowel sounds active, pt reports hiccups this morning, denies any further indigestion. Independently positions self in bed. Free from falls/ injury, call light within reach, alarms activated.

## 2023-07-25 NOTE — PROGRESS NOTES
"INPATIENT ROUNDING NOTE  7/25/2023    Patient: Nash STAPLES Moises    Physician of Record: Locums    Admitting diagnosis: Postoperative seroma involving digestive system after digestive system procedure [K91.872]  Nausea & vomiting [R11.2]    Procedure(s):  EXPLORATORY LAPAROTOMY, Abdominal Washout     POD: 2 Days Post-Op    Current Diet: Clear liquids    CURRENT MEDICATIONS:  Continuous Medications:  Current Facility-Administered Medications   Medication Last Rate    sodium chloride 125 mL/hr at 07/25/23 0548       Scheduled Medications:  Current Facility-Administered Medications   Medication Dose Route Frequency    cefTRIAXone  1 g Intravenous Q24H    enoxaparin ANTICOAGULANT  40 mg Subcutaneous Q24H    sodium chloride (PF)  3 mL Intracatheter Q8H       PRN Medications:  Current Facility-Administered Medications   Medication Dose Route Frequency    HYDROmorphone  0.5 mg Intravenous Q2H PRN    lidocaine 4%   Topical Q1H PRN    lidocaine (buffered or not buffered)  0.1-1 mL Other Q1H PRN    melatonin  1 mg Oral At Bedtime PRN    naloxone  0.2 mg Intravenous Q2 Min PRN    Or    naloxone  0.4 mg Intravenous Q2 Min PRN    Or    naloxone  0.2 mg Intramuscular Q2 Min PRN    Or    naloxone  0.4 mg Intramuscular Q2 Min PRN    nitroGLYcerin  0.4 mg Sublingual Q5 Min PRN    ondansetron  4 mg Oral Q6H PRN    Or    ondansetron  4 mg Intravenous Q6H PRN    prochlorperazine  5 mg Intravenous Q6H PRN    Or    prochlorperazine  5 mg Oral Q6H PRN    Or    prochlorperazine  12.5 mg Rectal Q12H PRN    sodium chloride (PF)  3 mL Intracatheter q1 min prn       SUBJECTIVE:   Nausea: No. Vomiting: No. Fever: No. Chills: No. Excessive burping: No. Flatus: Yes. BM: Yes. Pain is 0/10. Pain control: good. Tolerating current diet: Yes.      PHYSICAL EXAM:   Vital signs: /92 (BP Location: Right arm, Patient Position: Semi-Estevez's)   Pulse 95   Temp 98.6  F (37  C) (Oral)   Resp 16   Ht 1.626 m (5' 4\")   Wt 63.5 kg (139 lb 15.9 oz)   " SpO2 98%   BMI 24.03 kg/m     BMI: Body mass index is 24.03 kg/m .   General: Normal, healthy, cooperative, in no acute distress, alert   Lungs: respirations are non-labored   Abdominal: non-distended   Wound:  dressing intact to incisional site   Extremities: No cyanosis, clubbing or edema noted bilaterally in Upper and Lower Extremities   Neurological: without deficit    INPUT/OUTPUT:      Intake/Output Summary (Last 24 hours) at 7/25/2023 1200  Last data filed at 7/25/2023 0720  Gross per 24 hour   Intake 3100 ml   Output 1225 ml   Net 1875 ml       I/O last 3 completed shifts:  In: 3100 [P.O.:240; I.V.:2860]  Out: 975 [Urine:975]    LABS:    Last CBC Rrsults:   Recent Labs   Lab Test 07/24/23  0542 07/23/23  0514 07/22/23  0516   WBC 24.2* 18.9* 16.6*   RBC 3.75* 4.29* 4.73   HGB 11.4* 13.1* 14.3   HCT 33.3* 37.9* 42.3   MCV 89 88 89   MCH 30.4 30.5 30.2   MCHC 34.2 34.6 33.8   RDW 13.0 12.6 12.7    171 170       Last Comprehensive Metabolic panel:  Recent Labs   Lab Test 07/24/23  0542 07/23/23  0514 07/22/23  0516 07/21/23  1849    136 136 134*   POTASSIUM 4.0 3.7 4.1 4.2   CHLORIDE 104 103 101 94*   CO2 21* 19* 23 26   ANIONGAP 13 14 12 14   * 106* 119* 129*   BUN 24.6* 17.3 15.1 16.0   CR 0.79 0.69 0.78 0.90   GFRESTIMATED >90 >90 >90 90   ABRAHAM 8.2* 8.2* 8.6* 9.9   BILITOTAL 0.4  --  0.6 0.7   ALKPHOS 74  --  93 113   ALT 13  --  21 29   AST 16  --  22 29       Recent Labs   Lab Test 07/24/23  0542 07/22/23  0516 07/21/23  1849   ALBUMIN 2.7* 3.2* 3.9           ASSESSMENT:    2 Days Post-Op from Procedure(s):  EXPLORATORY LAPAROTOMY, Abdominal Washout.      PLAN:   Regular diet  Discharge home later today or tomorrow

## 2023-07-25 NOTE — CARE PLAN
Face to face report given with opportunity to observe patient.    Report given to CARRINGTON Tejeda RN   7/25/2023  7:07 AM

## 2023-07-26 PROBLEM — D72.828 NEUTROPHILIA: Status: ACTIVE | Noted: 2022-06-11

## 2023-07-26 PROBLEM — B17.9 ACUTE HEPATITIS: Status: ACTIVE | Noted: 2022-06-11

## 2023-07-26 PROBLEM — I25.10 CORONARY ARTERY DISEASE DUE TO LIPID RICH PLAQUE: Status: ACTIVE | Noted: 2023-01-01

## 2023-07-26 PROBLEM — I25.83 CORONARY ARTERY DISEASE DUE TO LIPID RICH PLAQUE: Status: ACTIVE | Noted: 2023-01-01

## 2023-07-26 PROBLEM — M62.82 NON-TRAUMATIC RHABDOMYOLYSIS: Status: ACTIVE | Noted: 2022-06-11

## 2023-07-26 PROBLEM — E44.0 MODERATE PROTEIN MALNUTRITION (H): Chronic | Status: ACTIVE | Noted: 2022-06-14

## 2023-07-26 PROBLEM — R05.8 PRODUCTIVE COUGH: Status: ACTIVE | Noted: 2022-06-11

## 2023-07-26 PROBLEM — N20.0 KIDNEY STONE ON LEFT SIDE: Status: ACTIVE | Noted: 2018-05-23

## 2023-07-26 PROBLEM — R63.4 ABNORMAL WEIGHT LOSS: Status: ACTIVE | Noted: 2022-06-11

## 2023-07-26 PROBLEM — Z79.02 ANTIPLATELET OR ANTITHROMBOTIC LONG-TERM USE: Status: ACTIVE | Noted: 2018-01-10

## 2023-08-02 NOTE — PATIENT INSTRUCTIONS
Thank you for allowing Dr. Patel and our surgical team to participate in your care. Please call our health unit coordinator at [phone] with scheduling questions or the nurse at [phone] with any other questions or concerns.    You have been scheduled for: PORT PLACEMENT with  on AUGUST 10TH.     Please see handout for additional instruction.    You WILL need a pre-operative appointment with your primary care provider.  You may call [phone] or [phone] with any questions.

## 2023-08-02 NOTE — PROGRESS NOTES
"CLINIC NOTE - POST-OP SURGERY  8/2/2023    Patient:Nash De Leon    Procedure: Exploratory laparotomy, extended Right Hemicolectomy with takedown of splenic flexure, liver biopsy and Exploratory laparotomy with abdominal washout     This is a 72 year old male who is 2 weeks s/p Exploratory laparotomy, extended Right Hemicolectomy with takedown of splenic flexure, liver biopsy and 1 week s/p Exploratory laparotomy with abdominal washout.  The patient does note he is fatigued.  He is eating and stooling without problems.    Current Medications:  Current Outpatient Medications   Medication Sig Dispense Refill    aspirin 81 MG tablet Take 81 mg by mouth every morning      HYDROcodone-acetaminophen (NORCO) 5-325 MG tablet Take 1 tablet by mouth every 6 hours as needed for severe pain      metoprolol succinate ER (TOPROL XL) 25 MG 24 hr tablet Take 12.5 mg by mouth every morning      nitroGLYcerin (NITROSTAT) 0.4 MG sublingual tablet Place 0.4 mg under the tongue every 5 minutes as needed for Chest pain. Do not crush; maximum of 3 doses in 15 minutes.      omeprazole (PRILOSEC) 40 MG DR capsule TAKE 1 CAPSULE(40 MG) BY MOUTH DAILY (Patient not taking: Reported on 8/2/2023) 90 capsule 3       Allergies:  No Known Allergies    PHYSICAL EXAM:   Vital signs: /74 (BP Location: Right arm, Cuff Size: Adult Regular)   Pulse 91   Temp 97.8  F (36.6  C) (Tympanic)   Resp 16   Ht 1.626 m (5' 4\")   Wt 61.2 kg (135 lb)   SpO2 98%   BMI 23.17 kg/m     BMI: Body mass index is 23.17 kg/m .   General: cooperative, in no acute distress, alert   Lungs: respirations are non-labored   Abdominal: non-distended   Wounds:  Well healed surgical scars consistent with his operation.     PATHOLOGY:  Case Report   Date Value Ref Range Status   07/17/2023   Final    Surgical Pathology Report                         Case: [id number]                                  Authorizing Provider:  Bruce Patel MD  Collected:           " 07/17/2023 03:32 PM          Ordering Location:     [facility] Main Operating Room     Received:            07/18/2023 07:18 AM          Pathologist:           Magdaleno Montalvo DO                                                         Specimens:   A) - Other, Right and Transverse Colon                                                              B) - Liver, liver mass                                                                      Final Diagnosis   Date Value Ref Range Status   07/17/2023   Final    A.  Terminal ileum, cecum, ascending and transverse colon, right hemicolectomy:  -Large cell neuroendocrine carcinoma.  -See synoptic report below.    B.  Liver, mass, biopsy:  -Metastatic large cell neuroendocrine carcinoma.  -See synoptic report below.          ASSESSMENT:    72 year old male 2 weeks s/p Exploratory laparotomy, extended Right Hemicolectomy with takedown of splenic flexure, liver biopsy and 1 week s/p Exploratory laparotomy with abdominal washout.     PLAN:   Patient will be scheduled for a port next week for chemotherapy. Patient to see oncology next week.  Some but not all staples were removed from incisional site without problems.

## 2023-08-02 NOTE — LETTER
August 2, 2023      Cass De Leon  [address] [address]        To Whom It May Concern,     Please excuse Cass De Leon from work from 7/12/23 through 8/15/23.           Sincerely,        Zina Giraldo, NP

## 2023-08-10 NOTE — NURSING NOTE
"Oncology Rooming Note    August 10, 2023 2:05 PM   Nash De Leon is a 72 year old male who presents for:    Chief Complaint   Patient presents with    Oncology Clinic Visit     New consult. Colon cancer metastasized to liver      Initial Vitals: /62   Pulse 96   Temp 98.8  F (37.1  C) (Tympanic)   Resp 14   Wt 59 kg (130 lb 1.1 oz)   SpO2 95%   BMI 22.33 kg/m   Estimated body mass index is 22.33 kg/m  as calculated from the following:    Height as of 8/2/23: 1.626 m (5' 4\").    Weight as of this encounter: 59 kg (130 lb 1.1 oz). Body surface area is 1.63 meters squared.  Severe Pain (7) Comment: Data Unavailable   No LMP for male patient.  Allergies reviewed: Yes  Medications reviewed: Yes    Medications: Medication refills not needed today.  Pharmacy name entered into EPIC:    [id number] - [place], [place] - [address] [id number] [address] AT [facility] [address]    Clinical concerns: none       Dora Grier LPN             "

## 2023-08-10 NOTE — PROGRESS NOTES
Patient's staples were removed without problems and steri strips were applied where staples were.  Patient will follow up with general surgery as needed.

## 2023-08-10 NOTE — PROGRESS NOTES
MEDICAL ONCOLOGY FOLLOW UP NOTE  Aug 10, 2023    Reason for Consult: Large cell neuroendocrine carcinoma.    Referring provider: Dr. 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 Miller initially presented to the emergency room at [place] on 6/25/2023 with 2 weeks of constipation and weight loss.  He states he was having dark-colored stools at that time.  However did not have any blood in his stools.    6/25/2023: CT abdomen and pelvis with contrast:ocal area of circumferential wall thickening within the transverse colon, concerning for neoplasm and/or localized inflammation. There is a small diverticulum is seen in this location. New low-density hepatic lesions lesions. In light of the previously mentioned finding raises concern for the possibility of metastatic disease. Alternatively, these changes may represent a number of other  possibilities ranging from focal fatty infiltration to dense benign  versus malignant lesions.  Radiodense gallstone. No CT evidence of acute cholecystitis.   Diverticulosis of the colon.    Was referred to surgery and subsequently saw Dr. Patel.    7/13/2023: Colonoscopy: He was found to have a mass in the transverse colon.  Biopsy of the mass showed neuroendocrine carcinoma.    7/17/2023: Exploratory laparotomy, extended right hemicolectomy with takedown of splenic flexure and liver biopsy.  During the procedure he was found to have multiple lesions on the liver.  A biopsy was taken at that time    Pathology showed 3 cm mass in the transverse colon which showed large cell neuroendocrine carcinoma.  Margins were negative.  20 lymph nodes were examined out of which 13 lymph nodes had tumor and 2 lymph nodes had tumor deposits.  Biopsy of the liver also showed large cell neuroendocrine carcinoma pT3 pN2b  pM1a    He was taken back to the OR on 7/23/2023 for an exploratory laparotomy with abdominal washout for persistent free air, leukocytosis and nausea and vomiting.    In June 2022 he was admitted to [facility] with muscle weakness, hepatitis and jaundice.  He was found to have elevated AST at 1877 and ALT at 873 and bilirubin of 4.5.  CT scan of the abdomen done at that time showed a nodular liver consistent with cirrhosis.  He was also found to have muscle inflammation  on MRI pelvis concerning for myopathy.  He was also found to have elevated CPK and aldolase.  His LFTs, CPK slowly improved.  He left before a muscle biopsy could be done.    Today he is seen in the clinic with his family.  He complains of 7 out of 10 abdominal pain.  He is taking hydrocodone with acetaminophen 5 325 mg.  He takes half pill as he feels like a full dose makes him emotional.  He denies any constipation anymore is able to have good bowel movements.  He states over the last few weeks he has lost around 30 pounds.  Does not have much of an appetite currently.    REVIEW OF SYSTEMS  A 12-point ROS negative except as in HPI      No current outpatient medications on file.       No Known Allergies  Immunization History   Administered Date(s) Administered    TDAP (Adacel,Boostrix) 01/03/2012       Past Medical History:   Diagnosis Date    Antiplatelet or antithrombotic long-term use     CAD (coronary artery disease)     History of BPH     Meniere's disease     Presence of stent in coronary artery in patient with coronary artery disease     Stented coronary artery        Past Surgical History:   Procedure Laterality Date    CARDIAC SURGERY      Stents    COLECTOMY RIGHT N/A 7/17/2023    Procedure: Extended HEMICOLECTOMY, RIGHT, OPEN Exploratory Laparotony and Liver biopsy;  Surgeon: Bruce Patel MD;  Location: [facility]    COLONOSCOPY      COLONOSCOPY N/A 7/13/2023    Procedure: COLONOSCOPY WITH BIOPSY AND POLYPECTOMY;  Surgeon:  Bruce Patel MD;  Location: [facility]    COMBINED CYSTOSCOPY, RETROGRADES, URETEROSCOPY, LASER HOLMIUM LITHOTRIPSY URETER(S), INSERT STENT Left 8/23/2018    Procedure: COMBINED CYSTOSCOPY, RETROGRADES, URETEROSCOPY, LASER HOLMIUM LITHOTRIPSY URETER(S), INSERT STENT;  Cystoscopy, Left Ueteroscopy with Holmium Laser Lithotripsy, Left Stent Placement;  Surgeon: Terrell Forman MD;  [facility]    COMBINED CYSTOSCOPY, RETROGRADES, URETEROSCOPY, LASER HOLMIUM LITHOTRIPSY URETER(S), INSERT STENT Left 9/13/2018    Procedure: COMBINED CYSTOSCOPY, RETROGRADES, URETEROSCOPY, LASER HOLMIUM LITHOTRIPSY URETER(S), INSERT STENT;  Cystoscopy, Left Ureteroscopy with Stone Basketing, Left Stent Exchange ;  Surgeon: Terrell Forman MD;  [facility]    LAPAROTOMY EXPLORATORY N/A 7/23/2023    Procedure: EXPLORATORY LAPAROTOMY, Abdominal Washout;  Surgeon: Elyse Montes MD;  Location: [facility]       SOCIAL HISTORY  History   Smoking Status    Former    Types: Cigarettes    Quit date: 7/24/1998   Smokeless Tobacco    Never    Social History    Substance and Sexual Activity      Alcohol use: No     History   Drug Use No       FAMILY HISTORY  Family History   Problem Relation Age of Onset    Colon Cancer Mother     Alzheimer Disease Father     Diabetes Sister        PHYSICAL EXAMINATION  /62   Pulse 96   Temp 98.8  F (37.1  C) (Tympanic)   Resp 14   Wt 59 kg (130 lb 1.1 oz)   SpO2 95%   BMI 22.33 kg/m    Wt Readings from Last 2 Encounters:   08/10/23 59 kg (130 lb 1.1 oz)   08/02/23 61.2 kg (135 lb)     Physical Exam  Constitutional:       Appearance: Normal appearance.   Pulmonary:      Effort: Pulmonary effort is normal.   Abdominal:      General: Abdomen is flat.      Palpations: Abdomen is soft.      Comments: Healing midline incision present.   Neurological:      Mental Status: He is alert.   Psychiatric:         Mood and Affect: Mood normal.         Behavior: Behavior normal.         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  He subsequently underwent a resection of the mass on 7/17/2023.  At that time he was found to have multiple lesions in the liver 1 of which was biopsied.  Pathology showed large cell neuroendocrine carcinoma in the colon and in the liver.  pT3 pN2b pM1a.    We discussed his pathology today and recommendations for treatment.  Given that this is has metastasized to the liver treatment will be palliative and not curative.    Given his large cell neuroendocrine carcinoma histology I would recommend treatment with 4-6 cycles of carboplatin and etoposide. Expected side effects which include but not limited to nasuea, vomiting, diarrhea, low blood counts, infection, kidney or liver toxicity, allergic reactions, neuropathy.  I did explain I would recommend starting treatment as quickly as we can if he is interested.  The other option would be to try a more palliative approach and proceed with symptom directed treatment.    He had a an admission in [facility] 6/2022 with liver failure, muscle weakness and rhabdomyolysis, myopathy seen on MRI pelvis.  He was recommended a muscle biopsy but did not undergo 1.  Review of notes show that this was felt to be less likely to be an autoimmune presentation but possibly consequences of a viral infection or less likely from statin use.  However given this presentation I would be a little hesitant to use immunotherapy given that sometimes it may precipitate another such condition.    He is requesting a second opinion to the [facility] for recommendations of treatment.  Referral was placed today.  Will request an urgent referral given the aggressive nature of the disease.  I did explain that if after meeting with the [facility] he chooses to undergo therapy and a different regimen is recommended and we can do his treatment here.  If he does  choose to undergo therapy I would recommend a PET scan for further staging.    His sons have questions regarding alternative treatment like black seed oil and ablative therapy to the liver.  I did explain I do not think these treatments at this point will have any benefit.      2. Cancer related pain:    His pain is currently not controlled with half a tablet of 1/2 hydrocodone and Tylenol.  I would recommend changing to oxycodone 5 mg to see if he has better pain control.    Follow up in 2 weeks.     Total time spent on the patient on day of encounter was 100 minutes doing chart review, review of test results, interpretation of results, patient visit and documentation.       Karolyn Duvall MD

## 2023-08-15 PROBLEM — G89.3 CANCER RELATED PAIN: Status: ACTIVE | Noted: 2023-01-01

## 2023-08-16 NOTE — PROGRESS NOTES
[facility]: Cancer Care                                                                                        Returned patient call in regards to referral sent to the [facility]. TC to the [facility], the referral is in the process of review. Patient should be outreached to by end of day no later than tomorrow. Advised to call if not heard from them by this time tomorrow.       Signature:  Bettina Calvillo RN

## 2023-08-28 NOTE — PROGRESS NOTES
Oncology Follow-up Visit    Reason for Visit:  Tai is a 72 year old man with a diagnosis of large cell neuroendocrine tumor, who I am visiting with today for symptom follow-up.     Nursing Note and documentation reviewed: Yes    Interval History: Tai notes that he isn't doing the greatest. Notes that he continues to have pain. Was taking Oxycodone 5 mg every 5-7 hours which was working but now needing every 4 hours and not always lasting. Hesitant about pain medications. Also endorses nausea, no vomiting. Due to these symptoms and in addition lack of appetite, patient isn't eating much and continues to loose weight. No recent infectious symptoms but very week. Took a shower today and that took everything out of him to dry off. No bleeding concerns.     States that he will still be seen at the [facility] next week for a second opinion although has decided that he will not go through chemo.     Oncologic History:    Mr. Miller initially presented to the emergency room at [place] on 6/25/2023 with 2 weeks of constipation and weight loss.  He states he was having dark-colored stools at that time.  However did not have any blood in his stools.     6/25/2023: CT abdomen and pelvis with contrast:ocal area of circumferential wall thickening within the transverse colon, concerning for neoplasm and/or localized inflammation. There is a small diverticulum is seen in this location. New low-density hepatic lesions lesions. In light of the previously mentioned finding raises concern for the possibility of metastatic disease. Alternatively, these changes may represent a number of other  possibilities ranging from focal fatty infiltration to dense benign  versus malignant lesions.  Radiodense gallstone. No CT evidence of acute cholecystitis.   Diverticulosis of the colon.     Was referred to surgery and subsequently saw Dr. Patel.     7/13/2023: Colonoscopy: He was found to have a mass in the transverse colon.  Biopsy of the mass  showed neuroendocrine carcinoma.     7/17/2023: Exploratory laparotomy, extended right hemicolectomy with takedown of splenic flexure and liver biopsy.  During the procedure he was found to have multiple lesions on the liver.  A biopsy was taken at that time     Pathology showed 3 cm mass in the transverse colon which showed large cell neuroendocrine carcinoma.  Margins were negative.  20 lymph nodes were examined out of which 13 lymph nodes had tumor and 2 lymph nodes had tumor deposits.  Biopsy of the liver also showed large cell neuroendocrine carcinoma pT3 pN2b pM1a     He was taken back to the OR on 7/23/2023 for an exploratory laparotomy with abdominal washout for persistent free air, leukocytosis and nausea and vomiting.     In June 2022 he was admitted to [facility] with muscle weakness, hepatitis and jaundice.  He was found to have elevated AST at 1877 and ALT at 873 and bilirubin of 4.5.  CT scan of the abdomen done at that time showed a nodular liver consistent with cirrhosis.  He was also found to have muscle inflammation  on MRI pelvis concerning for myopathy.  He was also found to have elevated CPK and aldolase.  His LFTs, CPK slowly improved.  He left before a muscle biopsy could be done.       Current Chemo Regimen/TX: WAITING ON SECOND OPINION    Past Medical History:   Diagnosis Date    Antiplatelet or antithrombotic long-term use     CAD (coronary artery disease)     History of BPH     Meniere's disease     Presence of stent in coronary artery in patient with coronary artery disease     Stented coronary artery        Past Surgical History:   Procedure Laterality Date    CARDIAC SURGERY      Stents    COLECTOMY RIGHT N/A 7/17/2023    Procedure: Extended HEMICOLECTOMY, RIGHT, OPEN Exploratory Laparotony and Liver biopsy;  Surgeon: Bruce Patel MD;  [facility]    COLONOSCOPY      COLONOSCOPY N/A 7/13/2023    Procedure: COLONOSCOPY WITH BIOPSY AND POLYPECTOMY;  Surgeon: Jorge  Bruce Álvarez MD;  Location: [facility]    COMBINED CYSTOSCOPY, RETROGRADES, URETEROSCOPY, LASER HOLMIUM LITHOTRIPSY URETER(S), INSERT STENT Left 2018    Procedure: COMBINED CYSTOSCOPY, RETROGRADES, URETEROSCOPY, LASER HOLMIUM LITHOTRIPSY URETER(S), INSERT STENT;  Cystoscopy, Left Ueteroscopy with Holmium Laser Lithotripsy, Left Stent Placement;  Surgeon: Terrell Forman MD;  [facility]    COMBINED CYSTOSCOPY, RETROGRADES, URETEROSCOPY, LASER HOLMIUM LITHOTRIPSY URETER(S), INSERT STENT Left 2018    Procedure: COMBINED CYSTOSCOPY, RETROGRADES, URETEROSCOPY, LASER HOLMIUM LITHOTRIPSY URETER(S), INSERT STENT;  Cystoscopy, Left Ureteroscopy with Stone Basketing, Left Stent Exchange ;  Surgeon: Terrell Forman MD;  [facility]    LAPAROTOMY EXPLORATORY N/A 2023    Procedure: EXPLORATORY LAPAROTOMY, Abdominal Washout;  Surgeon: Elyse Montes MD;  Location: [facility]       Family History   Problem Relation Age of Onset    Colon Cancer Mother     Alzheimer Disease Father     Diabetes Sister        Social History     Socioeconomic History    Marital status:      Spouse name: Not on file    Number of children: Not on file    Years of education: Not on file    Highest education level: Not on file   Occupational History    Not on file   Tobacco Use    Smoking status: Former     Types: Cigarettes     Quit date: 1998     Years since quittin.1    Smokeless tobacco: Never   Substance and Sexual Activity    Alcohol use: No    Drug use: No    Sexual activity: Not on file   Other Topics Concern    Not on file   Social History Narrative    Not on file     Social Determinants of Health     Financial Resource Strain: Not on file   Food Insecurity: Not on file   Transportation Needs: Not on file   Physical Activity: Not on file   Stress: Not on file   Social Connections: Not on file   Intimate Partner Violence: Not on file   Housing Stability: Not on file       Current Outpatient Medications    Medication    aspirin 81 MG tablet    metoprolol succinate ER (TOPROL XL) 25 MG 24 hr tablet    omeprazole (PRILOSEC) 40 MG DR capsule    ondansetron (ZOFRAN ODT) 8 MG ODT tab    oxyCODONE (ROXICODONE) 10 MG tablet    nitroGLYcerin (NITROSTAT) 0.4 MG sublingual tablet     No current facility-administered medications for this visit.        No Known Allergies    Review Of Systems:  A complete review of systems is negative except for the above mentioned items in the interval history.     Physical Exam:    Deferred given virtual exam.     Laboratory:  No results found for any visits on 08/28/23.    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  He subsequently underwent a resection of the mass on 7/17/2023.  At that time he was found to have multiple lesions in the liver 1 of which was biopsied.  Pathology showed large cell neuroendocrine carcinoma in the colon and in the liver.  pT3 pN2b pM1a.     Patient previously met with Dr. Duvall who recommended palliative chemo. Patient wishing for second opinion at the . Meets with them next week. Today, patient has told me that he is almost sure that he doesn't want chemotherapy as he doesn't feel the benefit outweighs how he is feeling. I did advise that, if he chooses to forego chemo, we should really think about getting hospice involved in order to help with symptoms and he agrees.     Will have him follow-up with [facility] next week.         2. Cancer related pain:Using Oxycodone q4 hours currently but not always controlling pain. We discussed starting a long acting pain medication. Explained how this provides sustained release vs immediate release from short acting. For now, patient would like to hold off on that. Will increase his dose of oxycodone to 10 mg q4h.     3. Nausea ODT Zofran sent. Encouraged to premedicate his meals and use PRN. To call if this doesn't  provide relief.     4. Weight loss Likely multifactorial r/t disease, pain. Encouraged patient to treat pain and nausea prior to meals. Encouraged smaller more frequent meals. Using Boost x2 daily. Encouraged hiding calories and optimizing caloric intake through protein.     Patient in agreement with plan and verbalizes understanding. Agrees to call with any questions or concerns.    35 minutes spent in the patient's encounter today with time spent in review of patient's chart along with chart preparation and review of the treatment plan and signing of treatment plan.  Time was also spent with the patient in obtaining a review of systems and performing a physical exam along with detailed review of all test results. Time was also spent in discussing plan for future follow-up and relating instructions for follow-up and in placing future orders.    MARYAM [facility]  Medical Oncology

## 2023-08-28 NOTE — NURSING NOTE
"Oncology Rooming Note    August 28, 2023 2:37 PM   Nash De Leon is a 72 year old male who presents for:    Chief Complaint   Patient presents with    Oncology Clinic Visit     Telephone visit. Pain assessment      Initial Vitals: Ht 1.626 m (5' 4\")   Wt 55.3 kg (122 lb)   BMI 20.94 kg/m   Estimated body mass index is 20.94 kg/m  as calculated from the following:    Height as of this encounter: 1.626 m (5' 4\").    Weight as of this encounter: 55.3 kg (122 lb). Body surface area is 1.58 meters squared.  Moderate Pain (5) Comment: Data Unavailable   No LMP for male patient.  Allergies reviewed: Yes  Medications reviewed: Yes    Medications: Medication refills not needed today.  Pharmacy name entered into EPIC:    [id number] - [place], [place] - [address] [id number] - [facility] [address] [facility] [address]    Clinical concerns: pain follow up. Rates pain 5/10 in abdomen today, right upper quadrant pain. Dr Duvall did switch him from Norco to Oxycodone 5 mg, does take 1 every 4 to 6 hours as well as 1 at bedtime to help with sleep. Denies vomiting, but does have lots of nausea and no appetite. Has lost more weight since the last visit. Has started Boost BID to help. States nothing tastes good or sounds good.   Lary Huffman was notified.      Mariaa Rocha LPN              "

## 2023-09-07 NOTE — PROGRESS NOTES
[facility]: Cancer Care                                                                                        Call rec'd from patient and wife stating that pt has agreed to do chemotherapy. Provider at the [facility] is recommending a CT scan CAP. OK per Dr. Duvall to order the scan. Appt scheduled for Tuesday at 11am      Signature:  Mariaa Moreau RN

## 2023-09-07 NOTE — NURSING NOTE
Is the patient currently in the state of [place]? YES    Visit mode:VIDEO    If the visit is dropped, the patient can be reconnected by: VIDEO VISIT: Send to e-mail at: [email or web address]    Will anyone else be joining the visit? Yes, Pt's wife (Cass) is with the pt and will be joining the video visit per pt  (If patient encounters technical issues they should call [phone]    How would you like to obtain your AVS? MyChart    Are changes needed to the allergy or medication list? Yes Pt is not taking Nitrostat per pt.    Reason for visit: Consult    No other vitals to report per pt    Alyssia KRISHNAMURTHYF

## 2023-09-07 NOTE — LETTER
9/7/2023         RE: Nash De Leon  [address]  [facility] [address]        Dear Colleague,    Thank you for referring your patient, Nash De Leon, to the [facility]. Please see a copy of my visit note below.      Virtual Visit Details    Type of service:  Video Visit   Video Start Time: 1:57 PM  Video End Time:2:38 PM    Originating Location (pt. Location): Home    Distant Location (provider location):  On-site  Platform used for Video Visit: [facility]      Oncology initial visit:  Date on this visit: 9/7/2023    Nash De Leon  is referred by Dr.Aparna Duvall for an oncology consultation. He is here to obtain a second opinion regarding metastatic large cell neuroendocrine carcinoma.        Primary Physician: Sami Lewis     History Of Present Illness:  Mr. De Leon is a 72 year old male who presents to get a second opinion regarding metastatic large cell neuroendocrine carcinoma.      This is a video visit. His wife and 2 sons are also there with him. I have also reviewed notes from his primary oncologist Dr. Duvall    In June 2023 he presented with constipation and abdominal pain for a couple of weeks.    He had further work up done.    6/25/2023.  CT abdomen/pelvis with contrast showed an area of circumferential wall thickening measuring approximately 2.6 cm within the midportion of the transverse colon.  It also showed new low-density hepatic lesions concerning for metastatic disease.    He had a colonoscopy on 7/13/2023 which showed a mass in the transverse colon and the biopsy of it was consistent with neuroendocrine carcinoma.    7/17/2023 she was taken to the operating room for open extended right hemicolectomy.  During the surgery he was noted to have multiple liver lesions involving both lobes of the liver consistent with metastatic disease.  In addition to the extended right hemicolectomy, he had biopsy of the liver.    Final pathology showed 3 cm  large cell neuroendocrine carcinoma involving the transverse colon invading through the muscularis propria into the pericolonic tissue.  There was lymphovascular invasion.  13 out of 20 lymph nodes were positive for metastatic large cell neuroendocrine carcinoma.  2 tumor deposits were seen.    Ki-67 index was 80%.    All margins were negative.    Liver biopsy also showed metastatic large cell neuroendocrine carcinoma.    It was a pT3 pN2b pM1a tumor.     On 7/23/2023, he was taken to the operating room again for another exploratory laparotomy with abdominal washout because of persistent free air, leukocytosis and nausea and vomiting.    Of note in June 2022, he was admitted with muscle weakness, elevated liver enzymes/hepatitis and jaundice.  At that time he was diagnosed with cirrhosis.  He also had elevated CPK and aldolase consistent with myositis.  LFTs and muscle enzymes improved.  There was plan to do muscle biopsy but he left before it could be performed.    Over the last couple of months he has become very weak and fatigued.  Previously he was pretty functional but now he is resting most of the time.  He feels nauseous and has no appetite.  He has lost about 25 pounds.  He feels some dyspnea on exertion.  He has pain in his abdomen for which he takes oxycodone every 4 hours.  He has baseline numbness in his hands at night.  He denies new swellings.  No infections.  No abnormal bleeding.      ECOG 2    ROS:  A comprehensive ROS was otherwise neg    Past Medical/Surgical History:  Past Medical History:   Diagnosis Date    Antiplatelet or antithrombotic long-term use     CAD (coronary artery disease)     History of BPH     Meniere's disease     Presence of stent in coronary artery in patient with coronary artery disease     Stented coronary artery      Past Surgical History:   Procedure Laterality Date    CARDIAC SURGERY      Stents    COLECTOMY RIGHT N/A 7/17/2023    Procedure: Extended HEMICOLECTOMY, RIGHT,  OPEN Exploratory Laparotony and Liver biopsy;  Surgeon: Bruce Patel MD;  Location: [facility]    COLONOSCOPY      COLONOSCOPY N/A 7/13/2023    Procedure: COLONOSCOPY WITH BIOPSY AND POLYPECTOMY;  Surgeon: Bruce Patel MD;  [facility]: [facility]    COMBINED CYSTOSCOPY, RETROGRADES, URETEROSCOPY, LASER HOLMIUM LITHOTRIPSY URETER(S), INSERT STENT Left 8/23/2018    Procedure: COMBINED CYSTOSCOPY, RETROGRADES, URETEROSCOPY, LASER HOLMIUM LITHOTRIPSY URETER(S), INSERT STENT;  Cystoscopy, Left Ueteroscopy with Holmium Laser Lithotripsy, Left Stent Placement;  Surgeon: Terrell Forman MD;  [facility]: [facility]    COMBINED CYSTOSCOPY, RETROGRADES, URETEROSCOPY, LASER HOLMIUM LITHOTRIPSY URETER(S), INSERT STENT Left 9/13/2018    Procedure: COMBINED CYSTOSCOPY, RETROGRADES, URETEROSCOPY, LASER HOLMIUM LITHOTRIPSY URETER(S), INSERT STENT;  Cystoscopy, Left Ureteroscopy with Stone Basketing, Left Stent Exchange ;  Surgeon: Terrell Forman MD;  [facility]    LAPAROTOMY EXPLORATORY N/A 7/23/2023    Procedure: EXPLORATORY LAPAROTOMY, Abdominal Washout;  Surgeon: Elsye Montes MD;  Location: [facility]     Cancer History:   As above  Allergies:  Allergies as of 09/07/2023    (No Known Allergies)     Current Medications:  Current Outpatient Medications   Medication Sig Dispense Refill    aspirin 81 MG tablet Take 81 mg by mouth every morning      metoprolol succinate ER (TOPROL XL) 25 MG 24 hr tablet Take 12.5 mg by mouth every morning      nitroGLYcerin (NITROSTAT) 0.4 MG sublingual tablet Place 0.4 mg under the tongue every 5 minutes as needed for Chest pain. Do not crush; maximum of 3 doses in 15 minutes. (Patient not taking: Reported on 8/10/2023)      omeprazole (PRILOSEC) 40 MG DR capsule TAKE 1 CAPSULE(40 MG) BY MOUTH DAILY 90 capsule 3    ondansetron (ZOFRAN ODT) 8 MG ODT tab Take 1 tablet (8 mg) by mouth every 8 hours as needed for nausea 45 tablet 1    oxyCODONE (ROXICODONE) 10 MG tablet Take 1 tablet (10  mg) by mouth every 4 hours 84 tablet 0      Family History:  Family History   Problem Relation Age of Onset    Colon Cancer Mother     Alzheimer Disease Father     Diabetes Sister      Mother had colon cancer in 70s.   He has 3 children- all healthy    Social History:  Social History     Socioeconomic History    Marital status:      Spouse name: Not on file    Number of children: Not on file    Years of education: Not on file    Highest education level: Not on file   Occupational History    Not on file   Tobacco Use    Smoking status: Former     Types: Cigarettes     Quit date: 1998     Years since quittin.1    Smokeless tobacco: Never   Substance and Sexual Activity    Alcohol use: No    Drug use: No    Sexual activity: Not on file   Other Topics Concern    Not on file   Social History Narrative    Not on file     Social Determinants of Health     Financial Resource Strain: Not on file   Food Insecurity: Not on file   Transportation Needs: Not on file   Physical Activity: Not on file   Stress: Not on file   Social Connections: Not on file   Intimate Partner Violence: Not on file   Housing Stability: Not on file     Quit smoking >30 years ago. He used to drink etoh but quit many years ago. Lives with wife.     Physical Exam:  There were no vitals taken for this visit.  Wt Readings from Last 4 Encounters:   23 55.3 kg (122 lb)   08/10/23 59 kg (130 lb 1.1 oz)   23 61.2 kg (135 lb)   23 63.5 kg (139 lb 15.9 oz)         Constitutional.  Does not seem to be in any acute distress.  Eyes.  No redness or discharge noted.  Respiratory.  Speaking in full sentences.  Breathing seems comfortable without any accessory use of muscles.    Skin.  Visualized his skin does not show any obvious rashes.  Musculoskeletal.  Range of motion for visualized areas is intact.  Neurological.  Alert and oriented x3.  Psychiatric.  Mood, mentation and affect are normal.  Decision making capacity is  intact.      The rest of a comprehensive physical examination is deferred due to Public [facility] video visit restrictions.      Laboratory/Imaging Studies    Reviewed    7/24/2023  CBC shows WBC 24.2.  Hemoglobin 11.4.  Platelets 216.  Chemistry showed bicarb 21.  BUN 24.  Creatinine 0.79.  Calcium 8.2.  Albumin 2.7.  Normal LFTs and bilirubin.      Imaging and pathology reviewed and mentioned above.    ASSESSMENT/PLAN:      Metastatic large cell neuroendocrine carcinoma of the colon with metastasis to the lymph nodes and liver.  Ki-67 index is 80%.    We discussed the situation in detail.  Treatment would be palliative in nature and not curative because of metastatic nature of the disease.  I would recommend repeating CT chest abdomen and pelvis or PET scan and then start palliative chemotherapy with carboplatin/etoposide.  We need to start as soon as possible because it is an aggressive cancer and it has caused significant deterioration in his performance status.    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    We also discussed option of not treating the cancer aggressively and focusing only on comfort measures.  This would be hospice approach.  I believe he has a decent chance of responding well to chemotherapy so it is worth giving chemotherapy to try.    He may also benefit from additional mutation testing, like foundation 1 testing on the tumor specimen.    Cancer related pain.  Currently taking oxycodone every 4 hours.  Continue that.  I would recommend evaluation by palliative care.    Weight loss.  From anorexia related to cancer.  He would benefit from nutrition consult.    He and his family members want to discuss among themselves before making a final decision.  He will follow with Dr. Duvall.   All of his and his family's questions were answered to their satisfaction.    They are agreeable and comfortable  with the plan.      Toma Solo MD     I spent >60 minutes on this visit on the date of service, including the video time, reviewing records and labs and imaging and as well as coordination of care and documentation.

## 2023-09-07 NOTE — PROGRESS NOTES
Virtual Visit Details    Type of service:  Video Visit   Video Start Time: 1:57 PM  Video End Time:2:38 PM    Originating Location (pt. Location): Home    Distant Location (provider location):  On-site  Platform used for Video Visit: [facility]      Oncology initial visit:  Date on this visit: 9/7/2023    Nash De Leon  is referred by Dr.Aparna Duvall for an oncology consultation. He is here to obtain a second opinion regarding metastatic large cell neuroendocrine carcinoma.        Primary Physician: Sami Lewis     History Of Present Illness:  Mr. De Leon is a 72 year old male who presents to get a second opinion regarding metastatic large cell neuroendocrine carcinoma.      This is a video visit. His wife and 2 sons are also there with him. I have also reviewed notes from his primary oncologist Dr. Duvall    In June 2023 he presented with constipation and abdominal pain for a couple of weeks.    He had further work up done.    6/25/2023.  CT abdomen/pelvis with contrast showed an area of circumferential wall thickening measuring approximately 2.6 cm within the midportion of the transverse colon.  It also showed new low-density hepatic lesions concerning for metastatic disease.    He had a colonoscopy on 7/13/2023 which showed a mass in the transverse colon and the biopsy of it was consistent with neuroendocrine carcinoma.    7/17/2023 she was taken to the operating room for open extended right hemicolectomy.  During the surgery he was noted to have multiple liver lesions involving both lobes of the liver consistent with metastatic disease.  In addition to the extended right hemicolectomy, he had biopsy of the liver.    Final pathology showed 3 cm large cell neuroendocrine carcinoma involving the transverse colon invading through the muscularis propria into the pericolonic tissue.  There was lymphovascular invasion.  13 out of 20 lymph nodes were positive for metastatic large cell neuroendocrine carcinoma.   2 tumor deposits were seen.    Ki-67 index was 80%.    All margins were negative.    Liver biopsy also showed metastatic large cell neuroendocrine carcinoma.    It was a pT3 pN2b pM1a tumor.     On 7/23/2023, he was taken to the operating room again for another exploratory laparotomy with abdominal washout because of persistent free air, leukocytosis and nausea and vomiting.    Of note in June 2022, he was admitted with muscle weakness, elevated liver enzymes/hepatitis and jaundice.  At that time he was diagnosed with cirrhosis.  He also had elevated CPK and aldolase consistent with myositis.  LFTs and muscle enzymes improved.  There was plan to do muscle biopsy but he left before it could be performed.    Over the last couple of months he has become very weak and fatigued.  Previously he was pretty functional but now he is resting most of the time.  He feels nauseous and has no appetite.  He has lost about 25 pounds.  He feels some dyspnea on exertion.  He has pain in his abdomen for which he takes oxycodone every 4 hours.  He has baseline numbness in his hands at night.  He denies new swellings.  No infections.  No abnormal bleeding.      ECOG 2    ROS:  A comprehensive ROS was otherwise neg    Past Medical/Surgical History:  Past Medical History:   Diagnosis Date    Antiplatelet or antithrombotic long-term use     CAD (coronary artery disease)     History of BPH     Meniere's disease     Presence of stent in coronary artery in patient with coronary artery disease     Stented coronary artery      Past Surgical History:   Procedure Laterality Date    CARDIAC SURGERY      Stents    COLECTOMY RIGHT N/A 7/17/2023    Procedure: Extended HEMICOLECTOMY, RIGHT, OPEN Exploratory Laparotony and Liver biopsy;  Surgeon: Bruce Patel MD;  [facility]: [facility]    COLONOSCOPY      COLONOSCOPY N/A 7/13/2023    Procedure: COLONOSCOPY WITH BIOPSY AND POLYPECTOMY;  Surgeon: Bruce Patel MD;  [facility]: [facility]     COMBINED CYSTOSCOPY, RETROGRADES, URETEROSCOPY, LASER HOLMIUM LITHOTRIPSY URETER(S), INSERT STENT Left 8/23/2018    Procedure: COMBINED CYSTOSCOPY, RETROGRADES, URETEROSCOPY, LASER HOLMIUM LITHOTRIPSY URETER(S), INSERT STENT;  Cystoscopy, Left Ueteroscopy with Holmium Laser Lithotripsy, Left Stent Placement;  Surgeon: Terrell Forman MD;  [facility]    COMBINED CYSTOSCOPY, RETROGRADES, URETEROSCOPY, LASER HOLMIUM LITHOTRIPSY URETER(S), INSERT STENT Left 9/13/2018    Procedure: COMBINED CYSTOSCOPY, RETROGRADES, URETEROSCOPY, LASER HOLMIUM LITHOTRIPSY URETER(S), INSERT STENT;  Cystoscopy, Left Ureteroscopy with Stone Basketing, Left Stent Exchange ;  Surgeon: Terrell Forman MD;  [facility]    LAPAROTOMY EXPLORATORY N/A 7/23/2023    Procedure: EXPLORATORY LAPAROTOMY, Abdominal Washout;  Surgeon: Elyse Montes MD;  Location: [facility]     Cancer History:   As above  Allergies:  Allergies as of 09/07/2023    (No Known Allergies)     Current Medications:  Current Outpatient Medications   Medication Sig Dispense Refill    aspirin 81 MG tablet Take 81 mg by mouth every morning      metoprolol succinate ER (TOPROL XL) 25 MG 24 hr tablet Take 12.5 mg by mouth every morning      nitroGLYcerin (NITROSTAT) 0.4 MG sublingual tablet Place 0.4 mg under the tongue every 5 minutes as needed for Chest pain. Do not crush; maximum of 3 doses in 15 minutes. (Patient not taking: Reported on 8/10/2023)      omeprazole (PRILOSEC) 40 MG DR capsule TAKE 1 CAPSULE(40 MG) BY MOUTH DAILY 90 capsule 3    ondansetron (ZOFRAN ODT) 8 MG ODT tab Take 1 tablet (8 mg) by mouth every 8 hours as needed for nausea 45 tablet 1    oxyCODONE (ROXICODONE) 10 MG tablet Take 1 tablet (10 mg) by mouth every 4 hours 84 tablet 0      Family History:  Family History   Problem Relation Age of Onset    Colon Cancer Mother     Alzheimer Disease Father     Diabetes Sister      Mother had colon cancer in 70s.   He has 3 children- all healthy    Social  History:  Social History     Socioeconomic History    Marital status:      Spouse name: Not on file    Number of children: Not on file    Years of education: Not on file    Highest education level: Not on file   Occupational History    Not on file   Tobacco Use    Smoking status: Former     Types: Cigarettes     Quit date: 1998     Years since quittin.1    Smokeless tobacco: Never   Substance and Sexual Activity    Alcohol use: No    Drug use: No    Sexual activity: Not on file   Other Topics Concern    Not on file   Social History Narrative    Not on file     Social Determinants of Health     Financial Resource Strain: Not on file   Food Insecurity: Not on file   Transportation Needs: Not on file   Physical Activity: Not on file   Stress: Not on file   Social Connections: Not on file   Intimate Partner Violence: Not on file   Housing Stability: Not on file     Quit smoking >30 years ago. He used to drink etoh but quit many years ago. Lives with wife.     Physical Exam:  There were no vitals taken for this visit.  Wt Readings from Last 4 Encounters:   23 55.3 kg (122 lb)   08/10/23 59 kg (130 lb 1.1 oz)   23 61.2 kg (135 lb)   23 63.5 kg (139 lb 15.9 oz)         Constitutional.  Does not seem to be in any acute distress.  Eyes.  No redness or discharge noted.  Respiratory.  Speaking in full sentences.  Breathing seems comfortable without any accessory use of muscles.    Skin.  Visualized his skin does not show any obvious rashes.  Musculoskeletal.  Range of motion for visualized areas is intact.  Neurological.  Alert and oriented x3.  Psychiatric.  Mood, mentation and affect are normal.  Decision making capacity is intact.      The rest of a comprehensive physical examination is deferred due to Public [facility] Emergency video visit restrictions.      Laboratory/Imaging Studies    Reviewed    2023  CBC shows WBC 24.2.  Hemoglobin 11.4.  Platelets 216.  Chemistry showed bicarb 21.   BUN 24.  Creatinine 0.79.  Calcium 8.2.  Albumin 2.7.  Normal LFTs and bilirubin.      Imaging and pathology reviewed and mentioned above.    ASSESSMENT/PLAN:      Metastatic large cell neuroendocrine carcinoma of the colon with metastasis to the lymph nodes and liver.  Ki-67 index is 80%.    We discussed the situation in detail.  Treatment would be palliative in nature and not curative because of metastatic nature of the disease.  I would recommend repeating CT chest abdomen and pelvis or PET scan and then start palliative chemotherapy with carboplatin/etoposide.  We need to start as soon as possible because it is an aggressive cancer and it has caused significant deterioration in his performance status.    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    We also discussed option of not treating the cancer aggressively and focusing only on comfort measures.  This would be hospice approach.  I believe he has a decent chance of responding well to chemotherapy so it is worth giving chemotherapy to try.    He may also benefit from additional mutation testing, like foundation 1 testing on the tumor specimen.    Cancer related pain.  Currently taking oxycodone every 4 hours.  Continue that.  I would recommend evaluation by palliative care.    Weight loss.  From anorexia related to cancer.  He would benefit from nutrition consult.    He and his family members want to discuss among themselves before making a final decision.  He will follow with Dr. Duvall.   All of his and his family's questions were answered to their satisfaction.    They are agreeable and comfortable with the plan.      Toma Solo MD     I spent >60 minutes on this visit on the date of service, including the video time, reviewing records and labs and imaging and as well as coordination of care and documentation.

## 2023-09-15 PROBLEM — C7A.1 LARGE CELL NEUROENDOCRINE CARCINOMA (H): Status: ACTIVE | Noted: 2023-01-01

## 2023-09-15 NOTE — ED PROVIDER NOTES
History     Chief Complaint   Patient presents with    Abdominal Pain    Vomiting     HPI  Nash De Leon is a 72 year old male with recent diagnosis of a large cell neuroendocrine carcinoma presents to the emergency department today with concern for abdominal pain nausea and vomiting.  He states 3 weeks ago he had resection of two thirds of his large bowel with anastomosis and he has had ongoing abdominal pain since then.  He also notes he has some right upper quadrant abdominal pain.  He states he has been having nausea and since the event but vomiting has started over the last week and has only gotten worse.  He states he is losing a lot of weight and feeling very weak.  Denies urinary symptoms.  Denies headache.  No other complaints at this time.    Allergies:  No Known Allergies    Problem List:    Patient Active Problem List    Diagnosis Date Noted    Large cell neuroendocrine carcinoma (H) 09/15/2023     Priority: Medium    Cancer related pain 08/15/2023     Priority: Medium    Paralytic ileus (H) 07/23/2023     Priority: Medium    Nausea & vomiting 07/22/2023     Priority: Medium    Nausea with vomiting 07/21/2023     Priority: Medium    Colon cancer metastasized to liver (H) 07/17/2023     Priority: Medium    Coronary artery disease due to lipid rich plaque 01/27/2023     Priority: Medium    Moderate protein malnutrition (H) 06/14/2022     Priority: Medium    Abnormal weight loss 06/11/2022     Priority: Medium    Acute hepatitis 06/11/2022     Priority: Medium    Neutrophilia 06/11/2022     Priority: Medium    Non-traumatic rhabdomyolysis 06/11/2022     Priority: Medium    Productive cough 06/11/2022     Priority: Medium    Kidney stone on left side 05/23/2018     Priority: Medium    Antiplatelet or antithrombotic long-term use 01/10/2018     Priority: Medium    Dryness of eye 05/07/2015     Priority: Medium     Formatting of this note might be different from the original. Updated per 10/1/17 O  import      Disorder of refraction and accommodation 09/10/2013     Priority: Medium     Formatting of this note might be different from the original. IMO Update      Senile incipient cataract 09/10/2013     Priority: Medium    Dysthymic disorder 05/16/2011     Priority: Medium    Benign prostatic hyperplasia with urinary obstruction 04/26/2011     Priority: Medium     Formatting of this note might be different from the original. IMO Update 10/11 Updated per 10/1/17 IMO import      Meniere's disease 11/15/2004     Priority: Medium     Formatting of this note might be different from the original. Meniere's disease. HIstory dating back to age 6, progressive haring loss left ear, infectious. Vertigo episodes, presure in ear, ringing left ear. Consistent with tetrad of Meniere's. IMO Update 10/11          Past Medical History:    Past Medical History:   Diagnosis Date    Antiplatelet or antithrombotic long-term use     CAD (coronary artery disease)     History of BPH     Meniere's disease     Presence of stent in coronary artery in patient with coronary artery disease     Stented coronary artery        Past Surgical History:    Past Surgical History:   Procedure Laterality Date    CARDIAC SURGERY      Stents    COLECTOMY RIGHT N/A 7/17/2023    Procedure: Extended HEMICOLECTOMY, RIGHT, OPEN Exploratory Laparotony and Liver biopsy;  Surgeon: Bruce Patel MD;  Location: [facility]    COLONOSCOPY      COLONOSCOPY N/A 7/13/2023    Procedure: COLONOSCOPY WITH BIOPSY AND POLYPECTOMY;  Surgeon: Bruce Patel MD;  Location: [facility]    COMBINED CYSTOSCOPY, RETROGRADES, URETEROSCOPY, LASER HOLMIUM LITHOTRIPSY URETER(S), INSERT STENT Left 8/23/2018    Procedure: COMBINED CYSTOSCOPY, RETROGRADES, URETEROSCOPY, LASER HOLMIUM LITHOTRIPSY URETER(S), INSERT STENT;  Cystoscopy, Left Ueteroscopy with Holmium Laser Lithotripsy, Left Stent Placement;  Surgeon: Terrell Forman MD;  Location: [facility]    COMBINED CYSTOSCOPY,  RETROGRADES, URETEROSCOPY, LASER HOLMIUM LITHOTRIPSY URETER(S), INSERT STENT Left 2018    Procedure: COMBINED CYSTOSCOPY, RETROGRADES, URETEROSCOPY, LASER HOLMIUM LITHOTRIPSY URETER(S), INSERT STENT;  Cystoscopy, Left Ureteroscopy with Stone Basketing, Left Stent Exchange ;  Surgeon: Terrell Forman MD;  Location: [facility]    LAPAROTOMY EXPLORATORY N/A 2023    Procedure: EXPLORATORY LAPAROTOMY, Abdominal Washout;  Surgeon: Elyse Montes MD;  Location: [facility]       Family History:    Family History   Problem Relation Age of Onset    Colon Cancer Mother     Alzheimer Disease Father     Diabetes Sister        Social History:  Marital Status:   [2]  Social History     Tobacco Use    Smoking status: Former     Types: Cigarettes     Quit date: 1998     Years since quittin.1    Smokeless tobacco: Never   Substance Use Topics    Alcohol use: No    Drug use: No        Medications:    aspirin 81 MG tablet  metoprolol succinate ER (TOPROL XL) 25 MG 24 hr tablet  nitroGLYcerin (NITROSTAT) 0.4 MG sublingual tablet  omeprazole (PRILOSEC) 40 MG DR capsule  ondansetron (ZOFRAN ODT) 8 MG ODT tab  oxyCODONE (ROXICODONE) 10 MG tablet          Review of Systems  See hpi  Physical Exam   BP: 112/77  Pulse: 86  Temp: 98.1  F (36.7  C)  Resp: 16  Weight: 54.6 kg (120 lb 6.4 oz)  SpO2: 97 %      Physical Exam  Constitutional: Alert and conversant. NAD   HENT: NCAT   Eyes: Normal pupils   Neck: supple   CV: Normal rate, regular rhythm, no murmur   Pulmonary/Chest: Non-labored respirations, clear to auscultation bilaterally   Abdominal: Soft, tender, non-distended positive Garduno sign, diffuse tenderness to palpation along the lower abdomen, no rebound, no guarding  MSK: BENÍTEZ.   Neuro: Alert and appropriate   Skin: Warm and dry. No diaphoresis. No rashes on exposed skin    Psych: Appropriate mood and affect     ED Course              ED Course as of 09/19/23 2146   Fri Sep 15, 2023   1700 Labs and  prochlorperazine 5 mg ordered.  1 L LR ordered for hydration.   1754 Appreciate the labs and Compazine orders from my colleague Humberto, fluid resuscitation appropriate   1927 Differential includes but is not limited to appendicitis, cholecystitis, pancreatitis, nephrolithiasis, gastroesophageal reflux disease, gastritis, pyelonephritis, urinary tract infection, testicular torsion, mesenteric ischemia, strangulated hernia, aortic aneurysm.   Concern for vomiting warrants CT head for evaluation of large mass given his cancer.  CT is reassuring.  CT abdomen and pelvis performed today prior to arrival does not demonstrate failure of the anastomosis, perforation, small bowel obstruction.  Worsening of his hepatic disease is noted.  Given the positive Garduno sign reasonable for right upper quadrant ultrasound.  Ultrasound pending on signout.  Patient signed out with plan to follow-up on labs and imaging, reassess the patient with disposition planning   2033 Procalcitonin(!): 1.41     Procedures            No results found for this or any previous visit (from the past 24 hour(s)).      Medications   prochlorperazine (COMPAZINE) injection 5 mg (5 mg Intravenous $Given 9/15/23 1748)   lactated ringers BOLUS 1,000 mL (0 mLs Intravenous Stopped 9/15/23 1852)   sodium chloride 0.9% BOLUS 1,000 mL (0 mLs Intravenous Stopped 9/15/23 2121)       Assessments & Plan (with Medical Decision Making)     I have reviewed the nursing notes.    I have reviewed the findings, diagnosis, plan and need for follow up with the patient.        Discharge Medication List as of 9/15/2023  9:21 PM          Final diagnoses:   Right upper quadrant abdominal pain       9/15/2023   [facility]       Isacc Ray MD  09/15/23 1929       Mike Johnson MD  09/19/23 5040

## 2023-09-18 NOTE — PROGRESS NOTES
[facility]: Cancer Care                                                                                        Call rec'd from patient's wife, stating that he is having dark red blood in his stools. He has gone twice this morning, both with dark red blood, and she is wondering what they should do. He is also vomiting still. She is wondering if he can see Dr Duvall today. Explained that Dr Duvall is not here today for an evaluation, but would recommend patient be seen in the ED for an evaluation. Explained it could be a GI bleed somewhere, which puts him at risk for low hemoglobin and the potential need for blood transfusions if he is bleeding. Wife is wondering if he can get into see Dr. Patel or Zina Giraldo today. Encouraged wife to call the surgery dept to see what their schedule is like. If not able to get in with Dr. Patel, recommended patient be evaluated in the emergency department.       Signature:  Mariaa Moreau RN

## 2023-09-21 PROBLEM — Z51.11 ENCOUNTER FOR ANTINEOPLASTIC CHEMOTHERAPY: Status: ACTIVE | Noted: 2023-01-01

## 2024-02-16 NOTE — UTILIZATION REVIEW
Admission Status; Secondary Review Determination       Under the authority of the Utilization Management Committee, the utilization review process indicated a secondary review on the above patient. The review outcome is based on review of the medical records, discussions with staff, and applying clinical experience noted on the date of the review.     (x) Inpatient Status Appropriate - This patient's medical care is consistent with medical management for inpatient care and reasonable inpatient medical practice.     RATIONALE FOR DETERMINATION    Patient requires inpatient admission versus short stay observation or outpatient treatment for the following reasons:      The patient is a 72-year-old man who was recently diagnosed with colon cancer and multiple lesions within the liver, underwent on July 17 Exploratory laparotomy, extended Right Hemicolectomy with takedown of splenic flexure, liver biopsy and he was discharged home.   On July 21 he returned to emergency room because of nausea vomiting abdominal pain, unable to pass flatus or bowel movements for 1 day.  The patient sodium was 134, lactic acid elevated at 2.8 and CRP elevated at 65.84.  The white blood cells are elevated at 17.3) elevated at 16.6.  The abdomen CT showed 3.7 cm collection of aerated fluid and debris along the left margin of the liver. Seroma and abscess are in the differential.  On admission date the surgeon recommended exploratory surgery because the air in the abdomen was out of proportion.  The patient prefers to wait and go for surgery if he was getting worse.  The patient was admitted n.p.o., with IV fluids and symptomatic treatment with rest.  The patient feels a little bit better, but still has ileus and it is required to be n.p.o. with IV fluids and close monitoring.    72-year-old man with exploratory laparotomy and extended right hemicolectomy on July 17, returns to the hospital with ileus, air in the abdomen, lactic  acidosis.  The patient needs close monitoring of the uterus, abdomen, IV fluids while he is n.p.o. with close monitoring of the electrolytes.    The expected length of stay at the time of admission was more than 2 nights because of the severity of illness, intensity of service provided, and risk for adverse outcome. Inpatient admission is appropriate.         This document was produced using voice recognition software       The information on this document is developed by the utilization review team in order for the business office to ensure compliance. This only denotes the appropriateness of proper admission status and does not reflect the quality of care rendered.   The definitions of Inpatient Status and Observation Status used in making the determination above are those provided in the CMS Coverage Manual, Chapter 1 and Chapter 6, section 70.4.   Sincerely,   JENNIFER ROSENBERG MD   Utilization Review  Physician Advisor  [facility]            Admission

## (undated) DEVICE — DRSG NON ADHERING 3 X 8 TELFA 1238

## (undated) DEVICE — CATH URETERAL OPEN END 5FRX70CM M0064002010

## (undated) DEVICE — SUCTION MANIFOLD DORNOCH ULTRA CART UL-CL500

## (undated) DEVICE — PAD CHUX UNDERPAD 30X36" P3036C

## (undated) DEVICE — DRAPE C-ARM W/STRAPS 42X72" 07-CA104

## (undated) DEVICE — ESU LIGASURE IMPACT OPEN SEALER/DVDR CVD LG JAW LF4418

## (undated) DEVICE — SOL WATER IRRIG 1000ML BOTTLE 2F7114

## (undated) DEVICE — SPONGE LAP 18X18" X8435

## (undated) DEVICE — Device

## (undated) DEVICE — SPECIMEN CONTAINER 5OZ STERILE 2600SA

## (undated) DEVICE — LABEL STERILE PREPRINTED FOR OR FRRH01-2M

## (undated) DEVICE — TAPE-MEDIPORE 4" X 10YD

## (undated) DEVICE — SOL NACL 0.9% IRRIG 1000ML BOTTLE 2F7124

## (undated) DEVICE — LINEN TOWEL PACK X5 5464

## (undated) DEVICE — ESU PENCIL W/SMOKE EVAC CVPLP2000

## (undated) DEVICE — CANISTER SUCTION MEDI-VAC GUARDIAN 2000ML 90D 65651-220

## (undated) DEVICE — BLADE 10 RB BK SS STRL LF DISP 371210

## (undated) DEVICE — SUTURE-SILK 3-0 SH POP-OFF C013D

## (undated) DEVICE — CAUTERY-NEEDLE 6.5" EDGE

## (undated) DEVICE — LASER FIBER HOLMIUM FLEXIVA 200UM M0068403910 840-391

## (undated) DEVICE — SYR 30ML LL W/O NDL 302832

## (undated) DEVICE — PACK LAPAROTOMY CUSTOM SBA32LPMBG

## (undated) DEVICE — DRSG SPONGE STERILE 8 X 4 2259

## (undated) DEVICE — DRAIN WOUND JACKSON PRATT 19FR PERFORATE TROCAR  SU130-0325

## (undated) DEVICE — COVER LT HANDLE 2/PK 5160-2FG

## (undated) DEVICE — BASKET STONE EXTRACTOR NITINOL NCIRCLE 1.5FRX115CM G46206

## (undated) DEVICE — GOWN SURG XL LVL 3 REINFORCED 9541

## (undated) DEVICE — ESU GROUND PAD ADULT W/CORD E7507

## (undated) DEVICE — BLANKET BAIR HUGGER UPPER BODY 42268

## (undated) DEVICE — STPL SKIN 35W 6.9MM  PXW35

## (undated) DEVICE — STAPLER-PROXIMATE 3.5MM X 60MM

## (undated) DEVICE — STRAP KNEE/BODY 31143004

## (undated) DEVICE — LINEN GOWN X4 5410

## (undated) DEVICE — SOL NACL 0.9% IRRIG 3000ML BAG 2B7477

## (undated) DEVICE — SYR 20ML LL W/O NDL 302830

## (undated) DEVICE — SU VICRYL 2-0 SH 27" UND J417H

## (undated) DEVICE — TRAY SKIN PREP POVIDONE/IODINE DYND70372

## (undated) DEVICE — CATH URETERAL DUAL LUMEN 10FRX54CM M0064051000

## (undated) DEVICE — ADAPTER SCOPE UROLOK II LF M0067301400

## (undated) DEVICE — GLOVE PROTEXIS POWDER FREE 8.0 ORTHOPEDIC 2D73ET80

## (undated) DEVICE — TUBING SUCTION 20FT N620A

## (undated) DEVICE — CLAMP COVER-RADIOPAQUE FABRIC

## (undated) DEVICE — GUIDEWIRE AMPLATZ SUPER STIFF .035 X 145CM M0066401080

## (undated) DEVICE — SPONGE PEANUT 3/8IN  7103

## (undated) DEVICE — JELLY LUBRICATING SURGILUBE 2OZ TUBE 0281-0205-02

## (undated) DEVICE — SPONGE RAY-TEC 4X4" 7317

## (undated) DEVICE — DRSG GAUZE 4X4" 3033

## (undated) DEVICE — CONNECTOR ERBEFLO 2 PORT 20325-215

## (undated) DEVICE — ADPT 5 IN 1 360

## (undated) DEVICE — SU VICRYL 3-0 SH 27" UND J416H

## (undated) DEVICE — CATH TRAY 16FR METER W/STATLOCK LATEX] A902916

## (undated) DEVICE — GOWN IMPERVIOUS SPECIALTY XLG/XLONG 32474

## (undated) DEVICE — DRESSING MEPILEX AG SILVER 4X8 395890

## (undated) DEVICE — GLOVE PROTEXIS W/NEU-THERA 7.5  2D73TE75

## (undated) DEVICE — SU SILK 2-0 TIE 12X30" A305H

## (undated) DEVICE — OSTOMY OR SET 70MM/2.75IN BLUE 416928

## (undated) DEVICE — PACK BASIN SET UP SUTCNBSBBA

## (undated) DEVICE — SUCTION WATERBUG FLOOR PUDDLE VAC 9321

## (undated) DEVICE — DRAPE IOBAN INCISE 13X13" 6640EZ

## (undated) DEVICE — GLOVE 8.5 PROTEXIS PI CLSC PF BD CUF STRL LF 12IN 2D72PL85X

## (undated) DEVICE — SLEEVE SCD EXPRESS KNEE LENGTH MED 9529

## (undated) DEVICE — TUBE-DUAL SUMP 20" 111196

## (undated) DEVICE — DRAIN JACKSON PRATT RESERVOIR 100ML SU130-1305

## (undated) DEVICE — SUTURE-0 PDS II 30" VIOLET TP-1

## (undated) DEVICE — SYR 70ML TOOMEY 041170

## (undated) DEVICE — APPLICATOR BNZN TNCT RYN SWBSTK NWVN SNGL APLS1106

## (undated) DEVICE — NDL COUNTER 20CT 31142493

## (undated) DEVICE — ESU ELEC BLADE 4" COATED

## (undated) DEVICE — SYR 50ML LL W/O NDL 309653

## (undated) DEVICE — LINEAR CUTTER-BLUE 75MM PROXIMATE

## (undated) DEVICE — GUIDEWIRE SENSOR DUAL FLEX STR 0.035"X150CM M0066703080

## (undated) DEVICE — BLADE CLIPPER SGL USE 9680

## (undated) DEVICE — RELOAD-BLUE 75MM PROXIMATE

## (undated) DEVICE — PREP CHLORAPREP 26ML TINTED HI-LITE ORANGE 930815

## (undated) DEVICE — NDL ANGIOCATH 14GA 1.25" 4048

## (undated) RX ORDER — LIDOCAINE HYDROCHLORIDE 20 MG/ML
INJECTION, SOLUTION EPIDURAL; INFILTRATION; INTRACAUDAL; PERINEURAL
Status: DISPENSED
Start: 2018-08-23

## (undated) RX ORDER — FENTANYL CITRATE-0.9 % NACL/PF 10 MCG/ML
PLASTIC BAG, INJECTION (ML) INTRAVENOUS
Status: DISPENSED
Start: 2023-01-01

## (undated) RX ORDER — FENTANYL CITRATE 50 UG/ML
INJECTION, SOLUTION INTRAMUSCULAR; INTRAVENOUS
Status: DISPENSED
Start: 2023-01-01

## (undated) RX ORDER — FENTANYL CITRATE 50 UG/ML
INJECTION, SOLUTION INTRAMUSCULAR; INTRAVENOUS
Status: DISPENSED
Start: 2018-09-13

## (undated) RX ORDER — CEFAZOLIN SODIUM 2 G/100ML
INJECTION, SOLUTION INTRAVENOUS
Status: DISPENSED
Start: 2018-08-23

## (undated) RX ORDER — ONDANSETRON 2 MG/ML
INJECTION INTRAMUSCULAR; INTRAVENOUS
Status: DISPENSED
Start: 2018-08-23

## (undated) RX ORDER — PHENYLEPHRINE HCL IN 0.9% NACL 1 MG/10 ML
SYRINGE (ML) INTRAVENOUS
Status: DISPENSED
Start: 2018-09-13

## (undated) RX ORDER — EPHEDRINE SULFATE 50 MG/ML
INJECTION, SOLUTION INTRAMUSCULAR; INTRAVENOUS; SUBCUTANEOUS
Status: DISPENSED
Start: 2018-08-23

## (undated) RX ORDER — PROPOFOL 10 MG/ML
INJECTION, EMULSION INTRAVENOUS
Status: DISPENSED
Start: 2018-09-13

## (undated) RX ORDER — ONDANSETRON 2 MG/ML
INJECTION INTRAMUSCULAR; INTRAVENOUS
Status: DISPENSED
Start: 2023-01-01

## (undated) RX ORDER — PROPOFOL 10 MG/ML
INJECTION, EMULSION INTRAVENOUS
Status: DISPENSED
Start: 2023-01-01

## (undated) RX ORDER — OXYCODONE HYDROCHLORIDE 5 MG/1
TABLET ORAL
Status: DISPENSED
Start: 2018-08-23

## (undated) RX ORDER — FENTANYL CITRATE 50 UG/ML
INJECTION, SOLUTION INTRAMUSCULAR; INTRAVENOUS
Status: DISPENSED
Start: 2018-08-23

## (undated) RX ORDER — LIDOCAINE HYDROCHLORIDE 20 MG/ML
INJECTION, SOLUTION EPIDURAL; INFILTRATION; INTRACAUDAL; PERINEURAL
Status: DISPENSED
Start: 2018-09-13

## (undated) RX ORDER — OXYCODONE HYDROCHLORIDE 5 MG/1
TABLET ORAL
Status: DISPENSED
Start: 2018-09-13

## (undated) RX ORDER — DEXAMETHASONE SODIUM PHOSPHATE 10 MG/ML
INJECTION, SOLUTION INTRAMUSCULAR; INTRAVENOUS
Status: DISPENSED
Start: 2023-01-01

## (undated) RX ORDER — ONDANSETRON 2 MG/ML
INJECTION INTRAMUSCULAR; INTRAVENOUS
Status: DISPENSED
Start: 2018-09-13

## (undated) RX ORDER — DEXAMETHASONE SODIUM PHOSPHATE 4 MG/ML
INJECTION, SOLUTION INTRA-ARTICULAR; INTRALESIONAL; INTRAMUSCULAR; INTRAVENOUS; SOFT TISSUE
Status: DISPENSED
Start: 2018-08-23

## (undated) RX ORDER — DEXAMETHASONE SODIUM PHOSPHATE 4 MG/ML
INJECTION, SOLUTION INTRA-ARTICULAR; INTRALESIONAL; INTRAMUSCULAR; INTRAVENOUS; SOFT TISSUE
Status: DISPENSED
Start: 2018-09-13

## (undated) RX ORDER — ACETAMINOPHEN 325 MG/1
TABLET ORAL
Status: DISPENSED
Start: 2018-08-23

## (undated) RX ORDER — METOPROLOL TARTRATE 1 MG/ML
INJECTION, SOLUTION INTRAVENOUS
Status: DISPENSED
Start: 2023-01-01